# Patient Record
Sex: FEMALE | Race: ASIAN | Employment: UNEMPLOYED | URBAN - METROPOLITAN AREA
[De-identification: names, ages, dates, MRNs, and addresses within clinical notes are randomized per-mention and may not be internally consistent; named-entity substitution may affect disease eponyms.]

---

## 2017-04-24 ENCOUNTER — OFFICE VISIT (OUTPATIENT)
Dept: FAMILY MEDICINE CLINIC | Age: 27
End: 2017-04-24

## 2017-04-24 VITALS
RESPIRATION RATE: 16 BRPM | BODY MASS INDEX: 33.96 KG/M2 | WEIGHT: 173 LBS | HEART RATE: 73 BPM | DIASTOLIC BLOOD PRESSURE: 70 MMHG | TEMPERATURE: 98.2 F | SYSTOLIC BLOOD PRESSURE: 114 MMHG | HEIGHT: 60 IN | OXYGEN SATURATION: 98 %

## 2017-04-24 DIAGNOSIS — J32.1 CHRONIC FRONTAL SINUSITIS: ICD-10-CM

## 2017-04-24 DIAGNOSIS — Z01.419 WELL WOMAN EXAM WITH ROUTINE GYNECOLOGICAL EXAM: Primary | ICD-10-CM

## 2017-04-24 DIAGNOSIS — F32.A MILD DEPRESSION: ICD-10-CM

## 2017-04-24 DIAGNOSIS — G44.229 CHRONIC TENSION-TYPE HEADACHE, NOT INTRACTABLE: ICD-10-CM

## 2017-04-24 RX ORDER — MELOXICAM 15 MG/1
15 TABLET ORAL
Qty: 30 TAB | Refills: 2 | Status: SHIPPED | OUTPATIENT
Start: 2017-04-24 | End: 2017-06-15 | Stop reason: ALTCHOICE

## 2017-04-24 RX ORDER — CYCLOBENZAPRINE HCL 5 MG
5 TABLET ORAL
Qty: 30 TAB | Refills: 2 | Status: SHIPPED | OUTPATIENT
Start: 2017-04-24 | End: 2017-06-15 | Stop reason: ALTCHOICE

## 2017-04-24 NOTE — PATIENT INSTRUCTIONS

## 2017-04-24 NOTE — PROGRESS NOTES
HISTORY OF PRESENT ILLNESS  Viki Stallings is a 32 y.o. female. she was seen for complete Gyn exam. Other concerns were, feeling very angry and tiered and headaches. HPI  Routine Gyn Exam  Patient here for routine exam.  Current Complaints: headache, irritability. Personal Health Questionnaire Reviewed: yes     Gynecologic History  Patient's last menstrual period was 2017. Contraception: condoms  Last Pap: 10/2015  Results: normal    Obstetric History  : 2  Para: 1  AB: 1  Had miscarriage 10/2016    Depression  She also seen for new evaluation and treatment of of depression. She complains of psychomotor agitation, fatigue and difficulty concentrating. Onset was approximately several months ago, unchanged since that time. She denies current suicidal and homicidal plan or intent. Family history significant for nothing. Possible organic causes contributing are: nutritional, she had miscarriage and heavy blood loss. [place]. Staying home and taking care of daughter when  is at work. . Previous drug treatment includes none; other therapy: no other therapies. She complains of the following side effects from the treatment: n/a      Review of Systems   Constitutional: Positive for malaise/fatigue. Negative for chills and fever. HENT: Negative for congestion, ear pain, sore throat and tinnitus. Eyes: Negative for blurred vision, double vision, pain and discharge. Respiratory: Negative for cough, shortness of breath and wheezing. Cardiovascular: Negative for chest pain, palpitations and leg swelling. Gastrointestinal: Positive for abdominal pain. Negative for blood in stool, constipation, diarrhea, nausea and vomiting. Genitourinary: Negative for dysuria, frequency, hematuria and urgency. Musculoskeletal: Negative for back pain, joint pain and myalgias. Skin: Negative for rash. Neurological: Positive for headaches.  Negative for dizziness, tremors and seizures. Endo/Heme/Allergies: Negative for polydipsia. Does not bruise/bleed easily. Psychiatric/Behavioral: Positive for depression. Negative for substance abuse. The patient is nervous/anxious. Physical Exam   Constitutional: She is oriented to person, place, and time. She appears well-developed and well-nourished. No distress. HENT:   Head: Normocephalic and atraumatic. Right Ear: Tympanic membrane and external ear normal. No drainage. Tympanic membrane is not injected. No middle ear effusion. No decreased hearing is noted. Left Ear: Tympanic membrane and external ear normal. No drainage. Tympanic membrane is not injected. No middle ear effusion. No decreased hearing is noted. Nose: Nose normal. No rhinorrhea. Mouth/Throat: Uvula is midline and oropharynx is clear and moist. No oropharyngeal exudate. Nasal mucosa congested, edematous   Eyes: Conjunctivae and EOM are normal. Pupils are equal, round, and reactive to light. No scleral icterus. Neck: Normal range of motion. Neck supple. No JVD present. No thyromegaly present. Cardiovascular: Normal rate, regular rhythm, normal heart sounds and intact distal pulses. Exam reveals no gallop and no friction rub. No murmur heard. Pulmonary/Chest: Effort normal and breath sounds normal. She has no wheezes. She has no rales. She exhibits no tenderness. Right breast exhibits no inverted nipple, no mass, no nipple discharge, no skin change and no tenderness. Left breast exhibits no inverted nipple, no mass, no nipple discharge, no skin change and no tenderness. Breasts are symmetrical.   Abdominal: Soft. Bowel sounds are normal. She exhibits no distension and no mass. There is no tenderness. Genitourinary: Uterus normal. No breast swelling, tenderness, discharge or bleeding. Cervix exhibits no motion tenderness, no discharge and no friability. Right adnexum displays no mass and no tenderness.  Left adnexum displays no mass and no tenderness. Vaginal discharge found. Musculoskeletal: Normal range of motion. She exhibits no edema or tenderness. Lymphadenopathy:        Head (right side): No tonsillar adenopathy present. Head (left side): No tonsillar adenopathy present. She has no cervical adenopathy. She has no axillary adenopathy. Right: No inguinal and no supraclavicular adenopathy present. Left: No inguinal and no supraclavicular adenopathy present. Neurological: She is alert and oriented to person, place, and time. She has normal reflexes. No cranial nerve deficit. Sensations normal   Skin: Skin is warm and dry. No rash noted. Psychiatric: She has a normal mood and affect. Her behavior is normal. Judgment and thought content normal.   Nursing note and vitals reviewed. ASSESSMENT and PLAN  Humza Session was seen today for complete physical, headache, leg pain and other. Diagnoses and all orders for this visit:    Well woman exam with routine gynecological exam  -     CBC WITH AUTOMATED DIFF  -     METABOLIC PANEL, COMPREHENSIVE  -     TSH REFLEX TO T4  -     URINALYSIS W/ RFLX MICROSCOPIC  -     PAP IG, RFX HPV ASCU, 16&18,45([id number])    Chronic frontal sinusitis    Chronic tension-type headache, not intractable  -     meloxicam (MOBIC) 15 mg tablet; Take 1 Tab by mouth nightly. -     cyclobenzaprine (FLEXERIL) 5 mg tablet; Take 1 Tab by mouth nightly. Mild depression ([address]    had counseling. Pt wants to hold on medicine for now  Discussed lifestyle issues and health guidance given  Patient was given an after visit summary which includes diagnoses, vital signs, current medications, instructions and references & authorized prescriptions . Results of labs will be conveyed to patient, once available. Pt verbalized instructions I provided and expressed understanding of discussion that was held today. Follow-up Disposition:  Return if symptoms worsen or fail to improve.

## 2017-04-24 NOTE — MR AVS SNAPSHOT
Visit Information Date & Time Provider Department Dept. Phone Encounter #  
 4/24/2017  2:00 PM Noelle Moran MD [address] [place] [address] [phone] [id number] Follow-up Instructions Return if symptoms worsen or fail to improve. Upcoming Health Maintenance Date Due DTaP/Tdap/Td series (1 - Tdap) 2/5/2011 PAP AKA CERVICAL CYTOLOGY 10/8/2018 Allergies as of 4/24/2017  Review Complete On: 4/24/2017 By: Noelle Moran MD  
 No Known Allergies Current Immunizations  Reviewed on 10/24/2016 Name Date Influenza Vaccine (Quad) PF 10/24/2016 Not reviewed this visit You Were Diagnosed With   
  
 Codes Comments Well woman exam with routine gynecological exam    -  Primary ICD-10-CM: B10.979 ICD-9-CM: V72.31 Chronic frontal sinusitis     ICD-10-CM: J32.1 ICD-9-CM: 609.3 Chronic tension-type headache, not intractable     ICD-10-CM: C09.380 ICD-9-CM: 339.12 Mild depression (HCC)     ICD-10-CM: F32.0 ICD-9-CM: 701 Vitals BP Pulse Temp Resp Height(growth percentile) Weight(growth percentile) 114/70 (BP 1 Location: Left arm, BP Patient Position: Sitting) 73 98.2 °F (36.8 °C) (Oral) 16 5' (1.524 m) 173 lb (78.5 kg) LMP SpO2 BMI OB Status Smoking Status 04/04/2017 98% 33.79 kg/m2 Having regular periods Never Smoker Vitals History BMI and BSA Data Body Mass Index Body Surface Area 33.79 kg/m 2 1.82 m 2 Preferred Pharmacy Pharmacy Name Phone [id number] [address] [phone] Your Updated Medication List  
  
   
This list is accurate as of: 4/24/17  2:43 PM.  Always use your most recent med list.  
  
  
  
  
 cyclobenzaprine 5 mg tablet Commonly known as:  FLEXERIL Take 1 Tab by mouth nightly. meloxicam 15 mg tablet Commonly known as:  MOBIC Take 1 Tab by mouth nightly. Prescriptions Sent to Pharmacy Refills  
 meloxicam (MOBIC) 15 mg tablet 2 Sig: Take 1 Tab by mouth nightly. Class: Normal  
 Pharmacy: [id number] Ph #: [phone] Route: Oral  
 cyclobenzaprine (FLEXERIL) 5 mg tablet 2 Sig: Take 1 Tab by mouth nightly. Class: Normal  
 Pharmacy: [facility]/pharmacy #[id number] Ph #: [phone] Route: Oral  
  
We Performed the Following CBC WITH AUTOMATED DIFF [03808 CPT(R)] METABOLIC PANEL, COMPREHENSIVE [87003 CPT(R)] PAP IG, [address] HPV ASCU, [id number]) [[id number] Custom] TSH REFLEX TO T4 [[id number] Custom] URINALYSIS W/ RFLX MICROSCOPIC [46579 CPT(R)] Follow-up Instructions Return if symptoms worsen or fail to improve. Patient Instructions Well Visit, Ages 25 to 48: Care Instructions Your Care Instructions Physical exams can help you stay healthy. Your doctor has checked your overall health and may have suggested ways to take good care of yourself. He or she also may have recommended tests. At home, you can help prevent illness with healthy eating, regular exercise, and other steps. Follow-up care is a key part of your treatment and safety. Be sure to make and go to all appointments, and call your doctor if you are having problems. It's also a good idea to know your test results and keep a list of the medicines you take. How can you care for yourself at home? · Reach and stay at a healthy weight. This will lower your risk for many problems, such as obesity, diabetes, heart disease, and high blood pressure. · Get at least 30 minutes of physical activity on most days of the week. Walking is a good choice. You also may want to do other activities, such as running, swimming, cycling, or playing tennis or team sports. Discuss any changes in your exercise program with your doctor. · Do not smoke or allow others to smoke around you. If you need help quitting, talk to your doctor about stop-smoking programs and medicines. These can increase your chances of quitting for good. · Talk to your doctor about whether you have any risk factors for sexually transmitted infections (STIs). Having one sex partner (who does not have STIs and does not have sex with anyone else) is a good way to avoid these infections. · Use birth control if you do not want to have children at this time. Talk with your doctor about the choices available and what might be best for you. · Protect your skin from too much sun. When you're outdoors from 10 a.m. to 4 p.m., stay in the shade or cover up with clothing and a hat with a wide brim. Wear sunglasses that block UV rays. Even when it's cloudy, put broad-spectrum sunscreen (SPF 30 or higher) on any exposed skin. · See a dentist one or two times a year for checkups and to have your teeth cleaned. · Wear a seat belt in the car. · Drink alcohol in moderation, if at all. That means no more than 2 drinks a day for men and 1 drink a day for women. Follow your doctor's advice about when to have certain tests. These tests can spot problems early. For everyone · Cholesterol. Have the fat (cholesterol) in your blood tested after age 21. Your doctor will tell you how often to have this done based on your age, family history, or other things that can increase your risk for heart disease. · Blood pressure. Have your blood pressure checked during a routine doctor visit. Your doctor will tell you how often to check your blood pressure based on your age, your blood pressure results, and other factors. · Vision. Talk with your doctor about how often to have a glaucoma test. 
· Diabetes. Ask your doctor whether you should have tests for diabetes. · Colon cancer. Have a test for colon cancer at age 48.  You may have one of several tests. If you are younger than 48, you may need a test earlier if you have any risk factors. Risk factors include whether you already had a precancerous polyp removed from your colon or whether your parent, brother, sister, or child has had colon cancer. For women · Breast exam and mammogram. Talk to your doctor about when you should have a clinical breast exam and a mammogram. Medical experts differ on whether and how often women under 50 should have these tests. Your doctor can help you decide what is right for you. · Pap test and pelvic exam. Begin Pap tests at age 24. A Pap test is the best way to find cervical cancer. The test often is part of a pelvic exam. Ask how often to have this test. 
· Tests for sexually transmitted infections (STIs). Ask whether you should have tests for STIs. You may be at risk if you have sex with more than one person, especially if your partners do not wear condoms. For men · Tests for sexually transmitted infections (STIs). Ask whether you should have tests for STIs. You may be at risk if you have sex with more than one person, especially if you do not wear a condom. · Testicular cancer exam. Ask your doctor whether you should check your testicles regularly. · Prostate exam. Talk to your doctor about whether you should have a blood test (called a PSA test) for prostate cancer. Experts differ on whether and when men should have this test. Some experts suggest it if you are older than 39 and are -American or have a father or brother who got prostate cancer when he was younger than 72. When should you call for help? Watch closely for changes in your health, and be sure to contact your doctor if you have any problems or symptoms that concern you. Where can you learn more? Go to http://kaveh-lauren.info/. Enter P072 in the search box to learn more about \"Well Visit, Ages 25 to 48: Care Instructions. \" Current as of: July 19, 2016 Content Version: 11.2 © 1150-2298 [employer], [employer]. Care instructions adapted under license by [employer] (which disclaims liability or warranty for this information). If you have questions about a medical condition or this instruction, always ask your healthcare professional. [address] any warranty or liability for your use of this information. Introducing [facility]! Belinda López introduces TrialBee patient portal. Now you can access parts of your medical record, email your doctor's office, and request medication refills online. 1. In your internet browser, go to [email or web address] 2. Click on the First Time User? Click Here link in the Sign In box. You will see the New Member Sign Up page. 3. Enter your TrialBee Access Code exactly as it appears below. You will not need to use this code after youve completed the sign-up process. If you do not sign up before the expiration date, you must request a new code. · TrialBee Access Code: [email or web address] Expires: 7/23/2017  2:42 PM 
 
4. Enter the last four digits of your Social Security Number (xxxx) and Date of Birth (mm/dd/yyyy) as indicated and click Submit. You will be taken to the next sign-up page. 5. Create a TrialBee ID. This will be your TrialBee login ID and cannot be changed, so think of one that is secure and easy to remember. 6. Create a TrialBee password. You can change your password at any time. 7. Enter your Password Reset Question and Answer. This can be used at a later time if you forget your password. 8. Enter your e-mail address. You will receive e-mail notification when new information is available in [address] 9. Click Sign Up. You can now view and download portions of your medical record. 10. Click the Download Summary menu link to download a portable copy of your medical information. If you have questions, please visit the Frequently Asked Questions section of the [employer] website. Remember, [employer] is NOT to be used for urgent needs. For medical emergencies, dial 911. Now available from your iPhone and Android! Please provide this summary of care documentation to your next provider. Your primary care clinician is listed as Chinita Kussmaul. If you have any questions after today's visit, please call [phone].

## 2017-04-25 LAB
ALBUMIN SERPL-MCNC: 4.5 G/DL (ref 3.5–5.5)
ALBUMIN/GLOB SERPL: 1.4 {RATIO} (ref 1.2–2.2)
ALP SERPL-CCNC: 117 IU/L (ref 39–117)
ALT SERPL-CCNC: 23 IU/L (ref 0–32)
APPEARANCE UR: ABNORMAL
AST SERPL-CCNC: 19 IU/L (ref 0–40)
BACTERIA #/AREA URNS HPF: ABNORMAL /[HPF]
BASOPHILS # BLD AUTO: 0 X10E3/UL (ref 0–0.2)
BASOPHILS NFR BLD AUTO: 0 %
BILIRUB SERPL-MCNC: 0.5 MG/DL (ref 0–1.2)
BILIRUB UR QL STRIP: NEGATIVE
BUN SERPL-MCNC: 8 MG/DL (ref 6–20)
BUN/CREAT SERPL: 22 (ref 9–23)
CALCIUM SERPL-MCNC: 9.5 MG/DL (ref 8.7–10.2)
CASTS URNS QL MICRO: ABNORMAL /LPF
CHLORIDE SERPL-SCNC: 102 MMOL/L (ref 96–106)
CO2 SERPL-SCNC: 28 MMOL/L (ref 18–29)
COLOR UR: YELLOW
CREAT SERPL-MCNC: 0.37 MG/DL (ref 0.57–1)
EOSINOPHIL # BLD AUTO: 0.1 X10E3/UL (ref 0–0.4)
EOSINOPHIL NFR BLD AUTO: 1 %
EPI CELLS #/AREA URNS HPF: ABNORMAL /HPF
ERYTHROCYTE [DISTWIDTH] IN BLOOD BY AUTOMATED COUNT: 15.6 % (ref 12.3–15.4)
GLOBULIN SER CALC-MCNC: 3.2 G/DL (ref 1.5–4.5)
GLUCOSE SERPL-MCNC: 112 MG/DL (ref 65–99)
GLUCOSE UR QL: NEGATIVE
HCT VFR BLD AUTO: 37.2 % (ref 34–46.6)
HGB BLD-MCNC: 11.9 G/DL (ref 11.1–15.9)
HGB UR QL STRIP: NEGATIVE
IMM GRANULOCYTES # BLD: 0 X10E3/UL (ref 0–0.1)
IMM GRANULOCYTES NFR BLD: 0 %
KETONES UR QL STRIP: NEGATIVE
LEUKOCYTE ESTERASE UR QL STRIP: ABNORMAL
LYMPHOCYTES # BLD AUTO: 2.3 X10E3/UL (ref 0.7–3.1)
LYMPHOCYTES NFR BLD AUTO: 37 %
MCH RBC QN AUTO: 26.9 PG (ref 26.6–33)
MCHC RBC AUTO-ENTMCNC: 32 G/DL (ref 31.5–35.7)
MCV RBC AUTO: 84 FL (ref 79–97)
MICRO URNS: ABNORMAL
MONOCYTES # BLD AUTO: 0.6 X10E3/UL (ref 0.1–0.9)
MONOCYTES NFR BLD AUTO: 10 %
MUCOUS THREADS URNS QL MICRO: PRESENT
NEUTROPHILS # BLD AUTO: 3.2 X10E3/UL (ref 1.4–7)
NEUTROPHILS NFR BLD AUTO: 52 %
NITRITE UR QL STRIP: NEGATIVE
PH UR STRIP: 7.5 [PH] (ref 5–7.5)
PLATELET # BLD AUTO: 282 X10E3/UL (ref 150–379)
POTASSIUM SERPL-SCNC: 4.4 MMOL/L (ref 3.5–5.2)
PROT SERPL-MCNC: 7.7 G/DL (ref 6–8.5)
PROT UR QL STRIP: NEGATIVE
RBC # BLD AUTO: 4.43 X10E6/UL (ref 3.77–5.28)
RBC #/AREA URNS HPF: ABNORMAL /HPF
SODIUM SERPL-SCNC: 139 MMOL/L (ref 134–144)
SP GR UR: 1.01 (ref 1–1.03)
TSH SERPL DL<=0.005 MIU/L-ACNC: 0.59 UIU/ML (ref 0.45–4.5)
UROBILINOGEN UR STRIP-MCNC: 1 MG/DL (ref 0.2–1)
WBC # BLD AUTO: 6.1 X10E3/UL (ref 3.4–10.8)
WBC #/AREA URNS HPF: ABNORMAL /HPF

## 2017-04-25 NOTE — PROGRESS NOTES
Please inform pt  Blood count, kidney, liver, thyroid, urine results are normal.once we receive pap smear result, will let her know.

## 2017-06-07 ENCOUNTER — HOSPITAL ENCOUNTER (EMERGENCY)
Age: 27
Discharge: HOME | End: 2017-06-07
Attending: EMERGENCY MEDICINE | Admitting: EMERGENCY MEDICINE
Payer: COMMERCIAL

## 2017-06-07 ENCOUNTER — APPOINTMENT (OUTPATIENT)
Dept: GENERAL RADIOLOGY | Age: 27
End: 2017-06-07
Attending: EMERGENCY MEDICINE
Payer: COMMERCIAL

## 2017-06-07 VITALS
RESPIRATION RATE: 16 BRPM | HEART RATE: 67 BPM | DIASTOLIC BLOOD PRESSURE: 77 MMHG | SYSTOLIC BLOOD PRESSURE: 129 MMHG | BODY MASS INDEX: 33.94 KG/M2 | TEMPERATURE: 97.7 F | WEIGHT: 173.8 LBS | OXYGEN SATURATION: 99 %

## 2017-06-07 DIAGNOSIS — R06.02 SOB (SHORTNESS OF BREATH): Primary | ICD-10-CM

## 2017-06-07 LAB
HCG UR QL: NEGATIVE
S PYO AG THROAT QL: NEGATIVE

## 2017-06-07 PROCEDURE — 70360 X-RAY EXAM OF NECK: CPT

## 2017-06-07 PROCEDURE — 87880 STREP A ASSAY W/OPTIC: CPT

## 2017-06-07 PROCEDURE — 71020 XR CHEST PA LAT: CPT

## 2017-06-07 PROCEDURE — 99283 EMERGENCY DEPT VISIT LOW MDM: CPT

## 2017-06-07 PROCEDURE — 74011000250 HC RX REV CODE- 250: Performed by: EMERGENCY MEDICINE

## 2017-06-07 PROCEDURE — 81025 URINE PREGNANCY TEST: CPT

## 2017-06-07 PROCEDURE — 94640 AIRWAY INHALATION TREATMENT: CPT

## 2017-06-07 PROCEDURE — 87070 CULTURE OTHR SPECIMN AEROBIC: CPT | Performed by: EMERGENCY MEDICINE

## 2017-06-07 PROCEDURE — 77030029684 HC NEB SM VOL KT MONA -A

## 2017-06-07 RX ORDER — ALBUTEROL SULFATE 90 UG/1
2 AEROSOL, METERED RESPIRATORY (INHALATION)
Qty: 1 INHALER | Refills: 0 | Status: SHIPPED | OUTPATIENT
Start: 2017-06-07 | End: 2017-09-26 | Stop reason: ALTCHOICE

## 2017-06-07 RX ORDER — ALBUTEROL SULFATE 0.83 MG/ML
2.5 SOLUTION RESPIRATORY (INHALATION)
Status: COMPLETED | OUTPATIENT
Start: 2017-06-07 | End: 2017-06-07

## 2017-06-07 RX ADMIN — ALBUTEROL SULFATE 2.5 MG: 2.5 SOLUTION RESPIRATORY (INHALATION) at 04:56

## 2017-06-07 NOTE — ED NOTES
Water provided as the patient has been unable to provide urine specimen thus far in the Emergency Department after approval from Dr. Singh Wilkinson. Respiratory at the bedside currently. POC Strep running at the bedside currently.

## 2017-06-07 NOTE — ED NOTES
Patient discharged by Dr. Luisa Foreman. I reviewed discharge instructions with the patient and her . No acute distress noted at time of discharge home. Per her , patient agrees to feeling improved at time of discharge home. Patient and her family decline refreshments at time of discharge home.

## 2017-06-07 NOTE — DISCHARGE INSTRUCTIONS
Shortness of Breath: Care Instructions  Your Care Instructions  Shortness of breath has many causes. Sometimes conditions such as anxiety can lead to shortness of breath. Some people get mild shortness of breath when they exercise. Trouble breathing also can be a symptom of a serious problem, such as asthma, lung disease, emphysema, heart problems, and pneumonia. If your shortness of breath continues, you may need tests and treatment. Watch for any changes in your breathing and other symptoms. Follow-up care is a key part of your treatment and safety. Be sure to make and go to all appointments, and call your doctor if you are having problems. Its also a good idea to know your test results and keep a list of the medicines you take. How can you care for yourself at home? · Do not smoke or allow others to smoke around you. If you need help quitting, talk to your doctor about stop-smoking programs and medicines. These can increase your chances of quitting for good. · Get plenty of rest and sleep. · Take your medicines exactly as prescribed. Call your doctor if you think you are having a problem with your medicine. · Find healthy ways to deal with stress. ¨ Exercise daily. ¨ Get plenty of sleep. ¨ Eat regularly and well. When should you call for help? Call 911 anytime you think you may need emergency care. For example, call if:  · You have severe shortness of breath. · You have symptoms of a heart attack. These may include:  ¨ Chest pain or pressure, or a strange feeling in the chest.  ¨ Sweating. ¨ Shortness of breath. ¨ Nausea or vomiting. ¨ Pain, pressure, or a strange feeling in the back, neck, jaw, or upper belly or in one or both shoulders or arms. ¨ Lightheadedness or sudden weakness. ¨ A fast or irregular heartbeat. After you call 911, the  may tell you to chew 1 adult-strength or 2 to 4 low-dose aspirin. Wait for an ambulance. Do not try to drive yourself.   Call your doctor now or seek immediate medical care if:  · Your shortness of breath gets worse or you start to wheeze. Wheezing is a high-pitched sound when you breathe. · You wake up at night out of breath or have to prop your head up on several pillows to breathe. · You are short of breath after only light activity or while at rest.  Watch closely for changes in your health, and be sure to contact your doctor if:  · You do not get better over the next 1 to 2 days. Where can you learn more? Go to http://kaveh-lauren.info/. Enter S780 in the search box to learn more about \"Shortness of Breath: Care Instructions. \"  Current as of: May 23, 2016  Content Version: 11.2  © 0996-8809 [employer]. Care instructions adapted under license by [employer] (which disclaims liability or warranty for this information). If you have questions about a medical condition or this instruction, always ask your healthcare professional. [place] [address] any warranty or liability for your use of this information. We hope that we have addressed all of your medical concerns. The examination and treatment you received in the Emergency Department were for an emergent problem and were not intended as complete care. It is important that you follow up with your healthcare provider(s) for ongoing care. If your symptoms worsen or do not improve as expected, and you are unable to reach your usual health care provider(s), you should return to the Emergency Department. Today's healthcare is undergoing tremendous change, and patient satisfaction surveys are one of the many tools to assess the quality of medical care. You may receive a survey from the [employer] organization regarding your experience in the Emergency Department. I hope that your experience has been completely positive, particularly the medical care that I provided.   As such, please participate in the survey; anything less than excellent does not meet my expectations or intentions. [facility] and [employer] participate in nationally recognized quality of care measures. If your blood pressure is greater than 120/80, as reported below, we urge that you seek medical care to address the potential of high blood pressure, commonly known as hypertension. Hypertension can be hereditary or can be caused by certain medical conditions, pain, stress, or \"white coat syndrome. \"       Please make an appointment with your health care provider(s) for follow up of your Emergency Department visit. VITALS:   Patient Vitals for the past 8 hrs:   Temp Pulse Resp BP SpO2   06/07/17 0543 97.7 °F (36.5 °C) 67 16 129/77 99 %   06/07/17 0139 98.2 °F (36.8 °C) 82 26 113/66 100 %          Thank you for allowing us to provide you with medical care today. We realize that you have many choices for your emergency care needs. Please choose us in the future for any continued health care needs. Suzie Weber Memos, [address] [facility].   Office: [phone]            Recent Results (from the past 24 hour(s))   POC GROUP A STREP    Collection Time: 06/07/17  5:03 AM   Result Value Ref Range    Group A strep (POC) NEGATIVE  NEG     HCG URINE, QL. - POC    Collection Time: 06/07/17  5:46 AM   Result Value Ref Range    Pregnancy test,urine (POC) NEGATIVE  NEG         Xr Neck Soft Tissue    Result Date: 6/7/2017  Clinical Data: Neck pain, dysphagia AP and lateral soft tissue evaluation of the neck dated 6/7/2017. Findings: Soft tissues of the neck are unremarkable. Upper airway is without evidence of narrowing. Epiglottis is normal. No prevertebral soft tissue swelling. Visualized cervical spine is unremarkable. No radiopaque foreign bodies are visualized. Impression: 1. No acute abnormality appreciated.      Xr Chest Pa Lat    Result Date: 6/7/2017  INDICATION:   SHORTNESS OF BREATH EXAM:  PA and Lateral Chest Radiographs COMPARISON: None FINDINGS: PA and lateral views of the chest demonstrate a normal cardiomediastinal silhouette. The lungs are adequately expanded. There is no edema, effusion, consolidation, or pneumothorax. The osseous structures are unremarkable. Multiple small calcifications are seen in the lower left neck, for which clinical correlation is recommended. IMPRESSION: No acute process.

## 2017-06-07 NOTE — ED PROVIDER NOTES
HPI Comments: 44-year-old female with no significant past medical history presents with complaints of dry throat and shortness of breath x3 days. Patient denies cough, sore throat, nasal congestion, fever. No history of similar complaints. Denies chest pain, nausea, vomiting, abdominal pain, back pain. No history of DVT/PE. Denies leg swelling. denies hemoptysis. Denies hormone use. No other complaints. Denies tobacco use, drug use and alcohol use    The history is provided by the patient and the spouse. Past Medical History:   Diagnosis Date    Anemia        Past Surgical History:   Procedure Laterality Date    HX APPENDECTOMY           Family History:   Problem Relation Age of Onset    No Known Problems Mother     No Known Problems Father        Social History     Social History    Marital status:      Spouse name: N/A    Number of children: N/A    Years of education: N/A     Occupational History    Not on file. Social History Main Topics    Smoking status: Never Smoker    Smokeless tobacco: Never Used    Alcohol use No    Drug use: No    Sexual activity: Not on file     Other Topics Concern    Not on file     Social History Narrative         ALLERGIES: Review of patient's allergies indicates no known allergies. Review of Systems   Constitutional: Negative for chills and fever. HENT: Negative for congestion, nosebleeds, rhinorrhea and sore throat. Eyes: Negative for pain and redness. Respiratory: Positive for shortness of breath. Negative for cough. Cardiovascular: Negative for chest pain and palpitations. Gastrointestinal: Negative for abdominal pain, nausea and vomiting. Genitourinary: Negative for dysuria, frequency, vaginal bleeding and vaginal pain. Musculoskeletal: Negative for myalgias. Skin: Negative for rash and wound. Neurological: Negative for seizures, syncope and weakness. Hematological: Does not bruise/bleed easily.    Psychiatric/Behavioral: Negative for agitation, confusion, dysphoric mood and suicidal ideas. The patient is not nervous/anxious. Vitals:    06/07/17 0139   BP: 113/66   Pulse: 82   Resp: 26   Temp: 98.2 °F (36.8 °C)   SpO2: 100%   Weight: 78.8 kg (173 lb 12.8 oz)            Physical Exam   Constitutional: She is oriented to person, place, and time. She appears well-developed and well-nourished. HENT:   Head: Normocephalic and atraumatic. Eyes: EOM are normal. Pupils are equal, round, and reactive to light. Neck: Normal range of motion. Neck supple. No tracheal deviation present. Cardiovascular: Normal rate, regular rhythm, normal heart sounds and intact distal pulses. Pulmonary/Chest: Effort normal and breath sounds normal. No stridor. No respiratory distress. She has no wheezes. She has no rales. She exhibits no tenderness. Abdominal: Soft. Bowel sounds are normal. She exhibits no distension. There is no tenderness. There is no rebound. Musculoskeletal: Normal range of motion. She exhibits no edema or tenderness. Neurological: She is alert and oriented to person, place, and time. No cranial nerve deficit. Skin: Skin is warm and dry. No rash noted. No pallor. Psychiatric: She has a normal mood and affect. Nursing note and vitals reviewed. MDM  Number of Diagnoses or Management Options  SOB (shortness of breath):   Diagnosis management comments:   54-year-old female presents with dry throat and shortness of breath x3 days. Patient is well-appearing, in no acute distress, no respiratory distress, clear to auscultation bilaterally, speaking in complete sentences, normal room air oxygen saturation, normal vital signs.   Chest x-ray unremarkable  Neck x-ray unremarkable       Amount and/or Complexity of Data Reviewed  Tests in the radiology section of CPT®: ordered and reviewed  Independent visualization of images, tracings, or specimens: yes    Risk of Complications, Morbidity, and/or Mortality  Presenting problems: low  Diagnostic procedures: low  Management options: low    Patient Progress  Patient progress: improved    ED Course       Procedures      5:41 AM  Pt reports breathing improved after nebulizer. Well appearing, nad, hd stable, no resp distress, CTAB.

## 2017-06-07 NOTE — ED TRIAGE NOTES
TRIAGE: Patient arrives from home, escorted by male  and child, for shortness of breath that has been ongoing for 3 days and worsened today. She denies any cough or chest pain. She reports that she feels like her throat is very dry. Patient speaks Sony [facility] Triage completed via [employer]  #[id number].

## 2017-06-08 ENCOUNTER — PATIENT OUTREACH (OUTPATIENT)
Dept: FAMILY MEDICINE CLINIC | Age: 27
End: 2017-06-08

## 2017-06-08 NOTE — PROGRESS NOTES
NNTOC-ED    Patient listed on discharge [facility]) report dated 17. Patient discharged from [facility] for SOB. Contacted patient to perform post ED/UC discharge assessment. Verified  and address with patient' , Karlos Braga on [employer]IP[employer], as identifiers. Provided introduction to self, and explanation of the NN role. Mr. Lynne Lucas reports that the patient is doing good. Performed medication reconciliation with Mr. Lynne Lucas, and he verbalizes understanding of administration of home medications. Reviewed discharge instructions with patient. Patient verbalizes understand of discharge instructions and follow-up care. This writer offered to send a message to the provider's nurse to schedule an appointment, but Mr. Lynne Lucas wishes to contact the office to schedule an appointment when it is needed. Reviewed red flags with patient, and patient verbalizes understanding. No other clinical/social/functional needs noted. Patient given an opportunity to ask questions. Contact information provided to the patient for future reference or further questions. Red Flags:  Call 911 anytime you think you may need emergency care. For example, call if:  · You have severe shortness of breath. · You have symptoms of a heart attack. These may include:  ¨ Chest pain or pressure, or a strange feeling in the chest.  ¨ Sweating. ¨ Shortness of breath. ¨ Nausea or vomiting. ¨ Pain, pressure, or a strange feeling in the back, neck, jaw, or upper belly or in one or both shoulders or arms. ¨ Lightheadedness or sudden weakness. ¨ A fast or irregular heartbeat. After you call 911, the  may tell you to chew 1 adult-strength or 2 to 4 low-dose aspirin. Wait for an ambulance. Do not try to drive yourself. Call your doctor now or seek immediate medical care if:  · Your shortness of breath gets worse or you start to wheeze. Wheezing is a high-pitched sound when you breathe.   · You wake up at night out of breath or have to prop your head up on several pillows to breathe. · You are short of breath after only light activity or while at rest.  Watch closely for changes in your health, and be sure to contact your doctor if:  · You do not get better over the next 1 to 2 days.

## 2017-06-09 LAB
BACTERIA SPEC CULT: NORMAL
SERVICE CMNT-IMP: NORMAL

## 2017-06-15 ENCOUNTER — OFFICE VISIT (OUTPATIENT)
Dept: FAMILY MEDICINE CLINIC | Age: 27
End: 2017-06-15

## 2017-06-15 VITALS
TEMPERATURE: 98 F | BODY MASS INDEX: 33.18 KG/M2 | OXYGEN SATURATION: 97 % | DIASTOLIC BLOOD PRESSURE: 79 MMHG | WEIGHT: 169 LBS | RESPIRATION RATE: 16 BRPM | SYSTOLIC BLOOD PRESSURE: 121 MMHG | HEART RATE: 70 BPM | HEIGHT: 60 IN

## 2017-06-15 DIAGNOSIS — J30.9 ALLERGIC SINUSITIS: Primary | ICD-10-CM

## 2017-06-15 RX ORDER — FAMOTIDINE 40 MG/1
40 TABLET, FILM COATED ORAL
Qty: 30 TAB | Refills: 0 | Status: SHIPPED | OUTPATIENT
Start: 2017-06-15 | End: 2017-08-02

## 2017-06-15 RX ORDER — MONTELUKAST SODIUM 10 MG/1
10 TABLET ORAL DAILY
Qty: 30 TAB | Refills: 0 | Status: SHIPPED | OUTPATIENT
Start: 2017-06-15 | End: 2017-08-02 | Stop reason: ALTCHOICE

## 2017-06-15 RX ORDER — FLUTICASONE PROPIONATE 50 MCG
SPRAY, SUSPENSION (ML) NASAL
Qty: 1 BOTTLE | Refills: 0 | Status: SHIPPED | OUTPATIENT
Start: 2017-06-15 | End: 2017-09-26 | Stop reason: ALTCHOICE

## 2017-06-15 NOTE — PROGRESS NOTES
Chief Complaint   Patient presents with    Shortness of Breath     patient states \" when breathing feels very tight in throat. \"  dryness in nose .  Headache    Other     missed period, no cycle since April 4, 2017      1. Have you been to the ER, urgent care clinic since your last visit? ER- [facility] :  Shortness of Breathe. Hospitalized since your last visit? No    2. Have you seen or consulted any other health care providers outside of the [address] [place] [address] since your last visit? Include any pap smears or colon screening.  No

## 2017-06-15 NOTE — PROGRESS NOTES
HISTORY OF PRESENT ILLNESS  Rob Camilo is a 32 y.o. female. She was seen for follow up her recent ER visit for dry throat and SOB. [facility]  Hospital Follow Up  Rob Camilo is seen for follow up from recent ED visit to [facility] on 6/7/17. We reviewed the active problem list, medication list, allergies, notes from last encounter, imaging, ER course. She presented with SOB and throat tightness. Had Xray chest and neck, both normal.she was given nebulized Rx, which helped and she was discharged with albuterol inhaler She is taking her albuterol as directed & without any side effects. She reports symptoms are not changed. She still has dryness of nose and throat and breathing difficulty at night. She has missed period x last 2 months, UPT in ER negative. Review of Systems   Constitutional: Negative for chills, fever and malaise/fatigue. HENT: Positive for congestion. Negative for ear pain, sore throat and tinnitus. Throat dryness and tightness   Eyes: Negative for blurred vision, double vision, pain and discharge. Respiratory: Positive for shortness of breath. Negative for cough and wheezing. Cardiovascular: Negative for chest pain, palpitations and leg swelling. Gastrointestinal: Negative for abdominal pain, blood in stool, constipation, diarrhea, nausea and vomiting. Genitourinary: Negative for dysuria, frequency, hematuria and urgency. Missed period   Musculoskeletal: Negative for back pain, joint pain and myalgias. Skin: Negative for rash. Neurological: Negative for dizziness, tremors, seizures and headaches. Endo/Heme/Allergies: Negative for polydipsia. Does not bruise/bleed easily. Psychiatric/Behavioral: Negative for depression and substance abuse. The patient is not nervous/anxious. Physical Exam   Constitutional: No distress. HENT:   Right Ear: Tympanic membrane normal. No drainage. Tympanic membrane is not injected. No middle ear effusion.  No decreased hearing is noted. Left Ear: Tympanic membrane normal. No drainage. Tympanic membrane is not injected. No middle ear effusion. No decreased hearing is noted. Nose: Mucosal edema present. No rhinorrhea. Right sinus exhibits maxillary sinus tenderness and frontal sinus tenderness. Left sinus exhibits maxillary sinus tenderness and frontal sinus tenderness. Mouth/Throat: Uvula is midline. Posterior oropharyngeal erythema present. No oropharyngeal exudate. Nasal mucosa congested, edematous   Cardiovascular: Regular rhythm and normal heart sounds. No murmur heard. Pulmonary/Chest: Effort normal and breath sounds normal. She has no wheezes. She has no rales. Lymphadenopathy:        Head (right side): No tonsillar adenopathy present. Head (left side): No tonsillar adenopathy present. She has no cervical adenopathy. Nursing note and vitals reviewed. ASSESSMENT and PLAN  Mp Dugan was seen today for shortness of breath, headache and other. Diagnoses and all orders for this visit:    Allergic sinusitis  -     montelukast (SINGULAIR) 10 mg tablet; Take 1 Tab by mouth daily. -     famotidine (PEPCID) 40 mg tablet; Take 1 Tab by mouth nightly. -     fluticasone (FLONASE) 50 mcg/actuation nasal spray; Use two spray each nostrils at night    advised to stay well hydrated and limit albuterol use. Discussed lifestyle issues and health guidance given  Patient was given an after visit summary which includes diagnoses, vital signs, current medications, instructions and references & authorized prescriptions . Pt verbalized instructions I provided and expressed understanding of discussion that was held today. Follow-up Disposition:  Return if symptoms worsen or fail to improve.

## 2017-06-15 NOTE — PATIENT INSTRUCTIONS

## 2017-06-15 NOTE — MR AVS SNAPSHOT
Visit Information Date & Time Provider Department Dept. Phone Encounter #  
 6/15/2017  1:00 PM Yuko Farfan, [address] [facility] [phone] [id number] Follow-up Instructions Return if symptoms worsen or fail to improve. Upcoming Health Maintenance Date Due DTaP/Tdap/Td series (1 - Tdap) 2/5/2011 INFLUENZA AGE 9 TO ADULT 8/1/2017 PAP AKA CERVICAL CYTOLOGY 5/2/2020 Allergies as of 6/15/2017  Review Complete On: 6/15/2017 By: Yuko Farfan MD  
 No Known Allergies Current Immunizations  Reviewed on 10/24/2016 Name Date Influenza Vaccine (Quad) PF 10/24/2016 Not reviewed this visit You Were Diagnosed With   
  
 Codes Comments Allergic sinusitis    -  Primary ICD-10-CM: J30.9 ICD-9-CM: 477.9 Vitals BP Pulse Temp Resp Height(growth percentile) Weight(growth percentile) 121/79 (BP 1 Location: Right arm, BP Patient Position: Sitting) 70 98 °F (36.7 °C) (Oral) 16 5' (1.524 m) 169 lb (76.7 kg) LMP SpO2 BMI OB Status Smoking Status 04/04/2017 97% 33.01 kg/m2 Having regular periods Never Smoker Vitals History BMI and BSA Data Body Mass Index Body Surface Area 33.01 kg/m 2 1.8 m 2 Preferred Pharmacy Pharmacy Name Phone [id number] [address] [phone] Your Updated Medication List  
  
   
This list is accurate as of: 6/15/17  1:38 PM.  Always use your most recent med list.  
  
  
  
  
 albuterol 90 mcg/actuation inhaler Commonly known as:  PROVENTIL HFA, VENTOLIN HFA, PROAIR HFA Take 2 Puffs by inhalation every six (6) hours as needed for Wheezing. famotidine 40 mg tablet Commonly known as:  PEPCID Take 1 Tab by mouth nightly. fluticasone 50 mcg/actuation nasal spray Commonly known as:  Sharon Alvarez Use two spray each nostrils at night  
  
 montelukast 10 mg tablet Commonly known as:  SINGULAIR Take 1 Tab by mouth daily. Prescriptions Sent to Pharmacy Refills  
 montelukast (SINGULAIR) 10 mg tablet 0 Sig: Take 1 Tab by mouth daily. Class: Normal  
 [id number] [address] Ph #: [phone] Route: Oral  
 famotidine (PEPCID) 40 mg tablet 0 Sig: Take 1 Tab by mouth nightly. Class: Normal  
 [id number] [address] Ph #: [phone] Route: Oral  
 fluticasone (FLONASE) 50 mcg/actuation nasal spray 0 Sig: Use two spray each nostrils at night Class: Normal  
 [id number] [address] Ph #: [phone] Follow-up Instructions Return if symptoms worsen or fail to improve. Patient Instructions Allergies: Care Instructions Your Care Instructions Allergies occur when your body's defense system (immune system) overreacts to certain substances. The immune system treats a harmless substance as if it were a harmful germ or virus. Many things can cause this overreaction, including pollens, medicine, food, dust, animal dander, and mold. Allergies can be mild or severe. Mild allergies can be managed with home treatment. But medicine may be needed to prevent problems. Managing your allergies is an important part of staying healthy. Your doctor may suggest that you have allergy testing to help find out what is causing your allergies. When you know what things trigger your symptoms, you can avoid them. This can prevent allergy symptoms and other health problems. For severe allergies that cause reactions that affect your whole body (anaphylactic reactions), your doctor may prescribe a shot of epinephrine to carry with you in case you have a severe reaction.  Learn how to give yourself the shot and keep it with you at all times. Make sure it is not . Follow-up care is a key part of your treatment and safety. Be sure to make and go to all appointments, and call your doctor if you are having problems. It's also a good idea to know your test results and keep a list of the medicines you take. How can you care for yourself at home? · If you have been told by your doctor that dust or dust mites are causing your allergy, decrease the dust around your bed: 
[facility] [place] [facility] sheets, pillowcases, and other bedding in hot water every week. ¨ Use dust-proof covers for pillows, duvets, and mattresses. Avoid plastic covers because they tear easily and do not \"breathe. \" Wash as instructed on the label. ¨ Do not use any blankets and pillows that you do not need. ¨ Use blankets that you can wash in your washing machine. ¨ Consider removing drapes and carpets, which attract and hold dust, from your bedroom. · If you are allergic to house dust and mites, do not use home humidifiers. Your doctor can suggest ways you can control dust and mites. · Look for signs of cockroaches. Cockroaches cause allergic reactions. Use cockroach baits to get rid of them. Then, clean your home well. Cockroaches like areas where grocery bags, newspapers, empty bottles, or cardboard boxes are stored. Do not keep these inside your home, and keep trash and food containers sealed. Seal off any spots where cockroaches might enter your home. · If you are allergic to mold, get rid of furniture, rugs, and drapes that smell musty. Check for mold in the bathroom. · If you are allergic to outdoor pollen or mold spores, use air-conditioning. Change or clean all filters every month. Keep windows closed. · If you are allergic to pollen, stay inside when pollen counts are high. Use a vacuum  with a HEPA filter or a double-thickness filter at least two times each week. · Stay inside when air pollution is bad. Avoid paint fumes, perfumes, and other strong odors. · Avoid conditions that make your allergies worse. Stay away from smoke. Do not smoke or let anyone else smoke in your house. Do not use fireplaces or wood-burning stoves. · If you are allergic to your pets, change the air filter in your furnace every month. Use high-efficiency filters. · If you are allergic to pet dander, keep pets outside or out of your bedroom. Old carpet and cloth furniture can hold a lot of animal dander. You may need to replace them. When should you call for help? Give an epinephrine shot if: 
· You think you are having a severe allergic reaction. · You have symptoms in more than one body area, such as mild nausea and an itchy mouth. After giving an epinephrine shot call 911, even if you feel better. Call 911 if: 
· You have symptoms of a severe allergic reaction. These may include: 
¨ Sudden raised, red areas (hives) all over your body. ¨ Swelling of the throat, mouth, lips, or tongue. ¨ Trouble breathing. ¨ Passing out (losing consciousness). Or you may feel very lightheaded or suddenly feel weak, confused, or restless. · You have been given an epinephrine shot, even if you feel better. Call your doctor now or seek immediate medical care if: 
· You have symptoms of an allergic reaction, such as: ¨ A rash or hives (raised, red areas on the skin). ¨ Itching. ¨ Swelling. ¨ Belly pain, nausea, or vomiting. Watch closely for changes in your health, and be sure to contact your doctor if: 
· You do not get better as expected. Where can you learn more? Go to http://kaveh-lauren.info/. Enter Y798 in the search box to learn more about \"Allergies: Care Instructions. \" Current as of: February 12, 2016 Content Version: 11.2 © 3661-9052 [employer].  Care instructions adapted under license by [employer] (which disclaims liability or warranty for this information). If you have questions about a medical condition or this instruction, always ask your healthcare professional. [address] any warranty or liability for your use of this information. Introducing [facility] [place] Silvia Lua introduces [employer] patient portal. Now you can access parts of your medical record, email your doctor's office, and request medication refills online. 1. In your internet browser, go to https://[employer]. [employer] 2. Click on the First Time User? Click Here link in the Sign In box. You will see the New Member Sign Up page. 3. Enter your [employer] Access Code exactly as it appears below. You will not need to use this code after youve completed the sign-up process. If you do not sign up before the expiration date, you must request a new code. · [employer] Access Code: [email or web address] Expires: 7/23/2017  2:42 PM 
 
4. Enter the last four digits of your Social Security Number (xxxx) and Date of Birth (mm/dd/yyyy) as indicated and click Submit. You will be taken to the next sign-up page. 5. Create a [employer] ID. This will be your [employer] login ID and cannot be changed, so think of one that is secure and easy to remember. 6. Create a [employer] password. You can change your password at any time. 7. Enter your Password Reset Question and Answer. This can be used at a later time if you forget your password. 8. Enter your e-mail address. You will receive e-mail notification when new information is available in [address] 9. Click Sign Up. You can now view and download portions of your medical record. 10. Click the Download Summary menu link to download a portable copy of your medical information. If you have questions, please visit the Frequently Asked Questions section of the [employer] website. Remember, [employer] is NOT to be used for urgent needs. For medical emergencies, dial 911. Now available from your iPhone and Android! Please provide this summary of care documentation to your next provider. Your primary care clinician is listed as Erika Abraham. If you have any questions after today's visit, please call [phone].

## 2017-08-02 ENCOUNTER — OFFICE VISIT (OUTPATIENT)
Dept: INTERNAL MEDICINE CLINIC | Age: 27
End: 2017-08-02

## 2017-08-02 VITALS
DIASTOLIC BLOOD PRESSURE: 62 MMHG | BODY MASS INDEX: 31.28 KG/M2 | WEIGHT: 170 LBS | HEART RATE: 74 BPM | OXYGEN SATURATION: 98 % | TEMPERATURE: 96.8 F | RESPIRATION RATE: 17 BRPM | HEIGHT: 62 IN | SYSTOLIC BLOOD PRESSURE: 111 MMHG

## 2017-08-02 DIAGNOSIS — N92.6 MISSED PERIOD: ICD-10-CM

## 2017-08-02 DIAGNOSIS — R07.0 THROAT DISCOMFORT: Primary | ICD-10-CM

## 2017-08-02 DIAGNOSIS — R10.13 EPIGASTRIC PAIN: ICD-10-CM

## 2017-08-02 DIAGNOSIS — K21.9 GASTROESOPHAGEAL REFLUX DISEASE WITHOUT ESOPHAGITIS: ICD-10-CM

## 2017-08-02 LAB
HCG URINE, QL. (POC): NEGATIVE
VALID INTERNAL CONTROL?: YES

## 2017-08-02 RX ORDER — PANTOPRAZOLE SODIUM 40 MG/1
40 TABLET, DELAYED RELEASE ORAL DAILY
Qty: 30 TAB | Refills: 2 | Status: SHIPPED | OUTPATIENT
Start: 2017-08-02 | End: 2020-08-11 | Stop reason: ALTCHOICE

## 2017-08-02 NOTE — MR AVS SNAPSHOT
Visit Information Date & Time Provider Department Dept. Phone Encounter #  
 8/2/2017 10:30 AM Js Ascencio MD [facility] Internal Medicine [phone] [id number] Follow-up Instructions Return in about 1 month (around 9/2/2017). Upcoming Health Maintenance Date Due DTaP/Tdap/Td series (1 - Tdap) 2/5/2011 INFLUENZA AGE 9 TO ADULT 8/1/2017 PAP AKA CERVICAL CYTOLOGY 5/2/2020 Allergies as of 8/2/2017  Review Complete On: 8/2/2017 By: Shantel Jimenez MD  
 No Known Allergies Current Immunizations  Reviewed on 10/24/2016 Name Date Influenza Vaccine (Quad) PF 10/24/2016 Not reviewed this visit You Were Diagnosed With   
  
 Codes Comments Throat discomfort    -  Primary ICD-10-CM: R07.0 ICD-9-CM: 784.1 Gastroesophageal reflux disease without esophagitis     ICD-10-CM: K21.9 ICD-9-CM: 530.81 Epigastric pain     ICD-10-CM: R10.13 ICD-9-CM: 789.06 Missed period     ICD-10-CM: N92.6 ICD-9-CM: 626.4 Vitals BP Pulse Temp Resp Height(growth percentile) Weight(growth percentile) 111/62 (BP 1 Location: Left arm, BP Patient Position: Sitting) 74 96.8 °F (36 °C) (Oral) 17 5' 1.5\" (1.562 m) 170 lb (77.1 kg) LMP SpO2 BMI OB Status Smoking Status 04/04/2017 98% 31.6 kg/m2 Unknown Never Smoker Vitals History BMI and BSA Data Body Mass Index Body Surface Area  
 31.6 kg/m 2 1.83 m 2 Preferred Pharmacy Pharmacy Name Phone [id number], [address] [place] [place] [address] [phone] Your Updated Medication List  
  
   
This list is accurate as of: 8/2/17 11:16 AM.  Always use your most recent med list.  
  
  
  
  
 albuterol 90 mcg/actuation inhaler Commonly known as:  PROVENTIL HFA, VENTOLIN HFA, PROAIR HFA Take 2 Puffs by inhalation every six (6) hours as needed for Wheezing. fluticasone 50 mcg/actuation nasal spray Commonly known as:  Genie Maximo Use two spray each nostrils at night  
  
 pantoprazole 40 mg tablet Commonly known as:  PROTONIX Take 1 Tab by mouth daily. Prescriptions Sent to Pharmacy Refills  
 pantoprazole (PROTONIX) 40 mg tablet 2 Sig: Take 1 Tab by mouth daily. Class: Normal  
 Pharmacy: [facility] #[id number] #: [phone] Route: Oral  
  
We Performed the Following AMB POC URINE PREGNANCY TEST, VISUAL COLOR COMPARISON [18417 CPT(R)] Follow-up Instructions Return in about 1 month (around 9/2/2017). Introducing [facility] & HEALTH SERVICES! [employer] introduces Rewarding Return patient portal. Now you can access parts of your medical record, email your doctor's office, and request medication refills online. 1. In your internet browser, go to [email or web address] 2. Click on the First Time User? Click Here link in the Sign In box. You will see the New Member Sign Up page. 3. Enter your Rewarding Return Access Code exactly as it appears below. You will not need to use this code after youve completed the sign-up process. If you do not sign up before the expiration date, you must request a new code. · Rewarding Return Access Code: [email or web address] Expires: 10/31/2017 11:14 AM 
 
4. Enter the last four digits of your Social Security Number (xxxx) and Date of Birth (mm/dd/yyyy) as indicated and click Submit. You will be taken to the next sign-up page. 5. Create a Floqt ID. This will be your Rewarding Return login ID and cannot be changed, so think of one that is secure and easy to remember. 6. Create a Rewarding Return password. You can change your password at any time. 7. Enter your Password Reset Question and Answer. This can be used at a later time if you forget your password. 8. Enter your e-mail address. You will receive e-mail notification when new information is available in [address] 9. Click Sign Up. You can now view and download portions of your medical record. 10. Click the Download Summary menu link to download a portable copy of your medical information. If you have questions, please visit the Frequently Asked Questions section of the [email or web address] website. Remember, [email or web address] is NOT to be used for urgent needs. For medical emergencies, dial 911. Now available from your iPhone and Android! Please provide this summary of care documentation to your next provider. Your primary care clinician is listed as Meg Kenney. If you have any questions after today's visit, please call [phone].

## 2017-08-03 NOTE — PROGRESS NOTES
Subjective:     Chief Complaint   Patient presents with    Other     stes that when she breathes, she feels like something is stuck in her throat. Worse at night x 1 month        She  is a 32y.o. year old female from [place] who presents as a new patient to establish as well as with some medical concern. Her past medical history is significant for chronic sinusitis, GERD. Reports that she has h/o irregular period. She sometimes miss 2-3 months in a row. Last period was in 4/2017. Not using any birth control at this point. She states that when she breaths, she feels like something is stuck in her chest and throat area. Sometimes she wakes up at night with feeling of cannot breath. Denies any cough, soa. Reports that when she eat something spicy or heavy food she feels epigastric pain and burning sensation in her throat. She had taken Prevacid for three weeks with no improvement. Xray soft tissue and chest was normal on 6/2017. Recent thyroid panel is normal     H/o miscarriage more than year ago. Pertinent items are noted in HPI.   Objective:     Vitals:    08/02/17 1035   BP: 111/62   Pulse: 74   Resp: 17   Temp: 96.8 °F (36 °C)   TempSrc: Oral   SpO2: 98%   Weight: 170 lb (77.1 kg)   Height: 5' 1.5\" (1.562 m)       Physical Examination: General appearance - alert, well appearing, and in no distress, oriented to person, place, and time and overweight  Mental status - alert, oriented to person, place, and time  Ears - bilateral TM's and external ear canals normal  Nose - normal and patent, no erythema, discharge or polyps  Mouth - mucous membranes moist, pharynx normal without lesions  Neck - supple, no significant adenopathy  Chest - clear to auscultation, no wheezes, rales or rhonchi, symmetric air entry  Heart - normal rate, regular rhythm, normal S1, S2, no murmurs, rubs, clicks or gallops    No Known Allergies   Social History     Social History    Marital status:      Spouse name: N/A    Number of children: N/A    Years of education: N/A     Social History Main Topics    Smoking status: Never Smoker    Smokeless tobacco: Never Used    Alcohol use No    Drug use: No    Sexual activity: Not Asked     Other Topics Concern    None     Social History Narrative      Family History   Problem Relation Age of Onset    No Known Problems Mother     No Known Problems Father       Past Surgical History:   Procedure Laterality Date    HX APPENDECTOMY        Past Medical History:   Diagnosis Date    Anemia       Current Outpatient Prescriptions   Medication Sig Dispense Refill    pantoprazole (PROTONIX) 40 mg tablet Take 1 Tab by mouth daily. 30 Tab 2    fluticasone (FLONASE) 50 mcg/actuation nasal spray Use two spray each nostrils at night 1 Bottle 0    albuterol (PROVENTIL HFA, VENTOLIN HFA, PROAIR HFA) 90 mcg/actuation inhaler Take 2 Puffs by inhalation every six (6) hours as needed for Wheezing. 1 Inhaler 0        Assessment/ Plan:   Diagnoses and all orders for this visit:    1. Throat discomfort  -   D/D GERD, somatization, Post nasal drip,  Start   pantoprazole (PROTONIX) 40 mg tablet; Take 1 Tab by mouth daily. 2. Gastroesophageal reflux disease without esophagitis  -     pantoprazole (PROTONIX) 40 mg tablet; Take 1 Tab by mouth daily. 3. Epigastric pain  -     pantoprazole (PROTONIX) 40 mg tablet; Take 1 Tab by mouth daily. 4. Missed period  -    H/O irregular period. No period for 3 months. Pregnancy test is negative. Will consider OCP if pt willing to start. -    AMB POC URINE PREGNANCY TEST, VISUAL COLOR COMPARISON is negative. Medication risks/benefits/costs/interactions/alternatives discussed with patient. Advised patient to call back or return to office if symptoms worsen/change/persist. If patient cannot reach us or should anything more severe/urgent arise he/she should proceed directly to the nearest emergency department.   Discussed expected course/resolution/complications of diagnosis in detail with patient. Patient given a written after visit summary which includes her diagnoses, current medications and vitals. Patient expressed understanding with the diagnosis and plan. Follow-up Disposition:  Return in about 1 month (around 9/2/2017).

## 2017-09-07 ENCOUNTER — OFFICE VISIT (OUTPATIENT)
Dept: INTERNAL MEDICINE CLINIC | Age: 27
End: 2017-09-07

## 2017-09-07 VITALS
HEART RATE: 95 BPM | WEIGHT: 170 LBS | TEMPERATURE: 97.5 F | RESPIRATION RATE: 18 BRPM | DIASTOLIC BLOOD PRESSURE: 62 MMHG | OXYGEN SATURATION: 99 % | BODY MASS INDEX: 31.28 KG/M2 | SYSTOLIC BLOOD PRESSURE: 112 MMHG | HEIGHT: 62 IN

## 2017-09-07 DIAGNOSIS — E66.9 OBESITY (BMI 30-39.9): ICD-10-CM

## 2017-09-07 DIAGNOSIS — N92.6 MISSED PERIOD: ICD-10-CM

## 2017-09-07 DIAGNOSIS — K21.9 GASTROESOPHAGEAL REFLUX DISEASE WITHOUT ESOPHAGITIS: Primary | ICD-10-CM

## 2017-09-07 DIAGNOSIS — R73.9 ELEVATED BLOOD SUGAR: ICD-10-CM

## 2017-09-07 LAB
HBA1C MFR BLD HPLC: 5.4 %
HCG URINE, QL. (POC): NEGATIVE
VALID INTERNAL CONTROL?: YES

## 2017-09-26 ENCOUNTER — OFFICE VISIT (OUTPATIENT)
Dept: FAMILY MEDICINE CLINIC | Age: 27
End: 2017-09-26

## 2017-09-26 VITALS
HEART RATE: 76 BPM | WEIGHT: 171 LBS | TEMPERATURE: 98.1 F | OXYGEN SATURATION: 98 % | RESPIRATION RATE: 16 BRPM | BODY MASS INDEX: 31.47 KG/M2 | SYSTOLIC BLOOD PRESSURE: 112 MMHG | HEIGHT: 62 IN | DIASTOLIC BLOOD PRESSURE: 70 MMHG

## 2017-09-26 DIAGNOSIS — N91.1 SECONDARY AMENORRHEA: Primary | ICD-10-CM

## 2017-09-26 DIAGNOSIS — E55.9 VITAMIN D DEFICIENCY: ICD-10-CM

## 2017-09-26 DIAGNOSIS — R10.13 EPIGASTRIC PAIN: ICD-10-CM

## 2017-09-26 DIAGNOSIS — J02.9 ALLERGIC PHARYNGITIS: ICD-10-CM

## 2017-09-26 LAB
BILIRUB UR QL STRIP: NEGATIVE
GLUCOSE UR-MCNC: NEGATIVE MG/DL
HCG URINE, QL. (POC): NEGATIVE
KETONES P FAST UR STRIP-MCNC: NEGATIVE MG/DL
PH UR STRIP: 5 [PH] (ref 4.6–8)
PROT UR QL STRIP: NEGATIVE MG/DL
SP GR UR STRIP: 1.02 (ref 1–1.03)
UA UROBILINOGEN AMB POC: NORMAL (ref 0.2–1)
URINALYSIS CLARITY POC: NORMAL
URINALYSIS COLOR POC: YELLOW
URINE BLOOD POC: NORMAL
URINE LEUKOCYTES POC: NORMAL
URINE NITRITES POC: NEGATIVE
VALID INTERNAL CONTROL?: YES

## 2017-09-26 RX ORDER — MEDROXYPROGESTERONE ACETATE 5 MG/1
5 TABLET ORAL 2 TIMES DAILY
Qty: 10 TAB | Refills: 0 | Status: SHIPPED | OUTPATIENT
Start: 2017-09-26 | End: 2020-08-11 | Stop reason: ALTCHOICE

## 2017-09-26 RX ORDER — MONTELUKAST SODIUM 10 MG/1
10 TABLET ORAL DAILY
Qty: 30 TAB | Refills: 0 | Status: SHIPPED | OUTPATIENT
Start: 2017-09-26 | End: 2017-11-18 | Stop reason: SDUPTHER

## 2017-09-26 RX ORDER — ERGOCALCIFEROL 1.25 MG/1
50000 CAPSULE ORAL
Qty: 4 CAP | Refills: 5 | Status: SHIPPED | OUTPATIENT
Start: 2017-09-26 | End: 2020-08-11 | Stop reason: ALTCHOICE

## 2017-09-26 NOTE — MR AVS SNAPSHOT
Visit Information Date & Time Provider Department Dept. Phone Encounter #  
 9/26/2017 10:20 AM Gurinder Horta MD [address] [place] [address] [phone] [id number] Follow-up Instructions Return if symptoms worsen or fail to improve. Upcoming Health Maintenance Date Due DTaP/Tdap/Td series (1 - Tdap) 2/5/2011 PAP AKA CERVICAL CYTOLOGY 5/2/2020 Allergies as of 9/26/2017  Review Complete On: 9/26/2017 By: Gurinder Horta MD  
 No Known Allergies Current Immunizations  Reviewed on 10/24/2016 Name Date Influenza Vaccine 8/23/2017 Influenza Vaccine (Quad) PF 10/24/2016 Not reviewed this visit You Were Diagnosed With   
  
 Codes Comments Secondary amenorrhea    -  Primary ICD-10-CM: N91.1 ICD-9-CM: 626.0 Epigastric pain     ICD-10-CM: R10.13 ICD-9-CM: 789.06 Allergic pharyngitis     ICD-10-CM: J02.9 ICD-9-CM: 251 Vitamin D deficiency     ICD-10-CM: E55.9 ICD-9-CM: 268.9 Vitals BP Pulse Temp Resp Height(growth percentile) Weight(growth percentile) 112/70 (BP 1 Location: Right arm, BP Patient Position: Sitting) 76 98.1 °F (36.7 °C) (Oral) 16 5' 1.5\" (1.562 m) 171 lb (77.6 kg) LMP SpO2 BMI OB Status Smoking Status 04/04/2017 98% 31.79 kg/m2 Unknown Never Smoker Vitals History BMI and BSA Data Body Mass Index Body Surface Area 31.79 kg/m 2 1.83 m 2 Preferred Pharmacy Pharmacy Name Phone [id number] [address] [phone] Your Updated Medication List  
  
   
This list is accurate as of: 9/26/17 11:06 AM.  Always use your most recent med list.  
  
  
  
  
 ergocalciferol 50,000 unit capsule Commonly known as:  ERGOCALCIFEROL Take 1 Cap by mouth every seven (7) days. medroxyPROGESTERone 5 mg tablet Commonly known as:  PROVERA Take 1 Tab by mouth two (2) times a day.  Indications: SECONDARY AMENORRHEA  
 montelukast 10 mg tablet Commonly known as:  SINGULAIR Take 1 Tab by mouth daily. pantoprazole 40 mg tablet Commonly known as:  PROTONIX Take 1 Tab by mouth daily. Prescriptions Sent to Pharmacy Refills  
 medroxyPROGESTERone (PROVERA) 5 mg tablet 0 Sig: Take 1 Tab by mouth two (2) times a day. Indications: SECONDARY AMENORRHEA Class: Normal  
 [id number] [address] Ph #: [phone] Route: Oral  
 montelukast (SINGULAIR) 10 mg tablet 0 Sig: Take 1 Tab by mouth daily. Class: Normal  
 [id number] [address] Ph #: [phone] Route: Oral  
 ergocalciferol (ERGOCALCIFEROL) 50,000 unit capsule 5 Sig: Take 1 Cap by mouth every seven (7) days. Class: Normal  
 [id number] [address] Ph #: [phone] Route: Oral  
  
We Performed the Following AMB POC URINALYSIS DIP STICK AUTO W/O MICRO [68764 CPT(R)] AMB POC URINE PREGNANCY TEST, VISUAL COLOR COMPARISON [92062 CPT(R)] Follow-up Instructions Return if symptoms worsen or fail to improve. Patient Instructions Learning About Vitamin D Why is it important to get enough vitamin D? Your body needs vitamin D to absorb calcium. Calcium keeps your bones and muscles, including your heart, healthy and strong. If your muscles don't get enough calcium, they can cramp, hurt, or feel weak. You may have long-term (chronic) muscle aches and pains. If you don't get enough vitamin D throughout life, you have an increased chance of having thin and brittle bones (osteoporosis) in your later years. Children who don't get enough vitamin D may not grow as much as others their age. They also have a chance of getting a rare disease called rickets. It causes weak bones. Vitamin D and calcium are added to many foods. And your body uses sunshine to make its own vitamin D. How much vitamin D do you need? The [place] of Medicine recommends that people ages 3 through 79 get 600 IU (international units) every day. Adults 71 and older need 800 IU every day. Blood tests for vitamin D can check your vitamin D level. But there is no standard normal range used by all laboratories. The [place] of Medicine recommends a blood level of 20 ng/mL of vitamin D for healthy bones. And most people in the [place] and [facility]) meet this goal. 
How can you get more vitamin D? Foods that contain vitamin D include: 
· [place], tuna, and mackerel. These are some of the best foods to eat when you need to get more vitamin D. 
· Cheese, egg yolks, and beef liver. These foods have vitamin D in small amounts. · Milk, soy drinks, orange juice, yogurt, margarine, and some kinds of cereal have vitamin D added to them. Some people don't make vitamin D as well as others. They may have to take extra care in getting enough vitamin D. Things that reduce how much vitamin D your body makes include: · Dark skin, such as many  Americans have. · Age, especially if you are older than 72. · Digestive problems, such as Crohn's or celiac disease. · Liver and kidney disease. Some people who do not get enough vitamin D may need supplements. Are there any risks from taking vitamin D? 
· Too much vitamin D: 
¨ Can damage your kidneys. ¨ Can cause nausea and vomiting, constipation, and weakness. ¨ Raises the amount of calcium in your blood. If this happens, you can get confused or have an irregular heart rhythm. · Vitamin D may interact with other medicines. Tell your doctor about all of the medicines you take, including over-the-counter drugs, herbs, and pills. Tell your doctor about all of your current medical problems. Where can you learn more? Go to http://kaveh-lauren.info/. Enter [id number] in the search box to learn more about \"Learning About Vitamin D.\" 
Current as of: July 26, 2016 Content Version: 11.3 © 4037-1700 [employer]. Care instructions adapted under license by [employer] (which disclaims liability or warranty for this information). If you have questions about a medical condition or this instruction, always ask your healthcare professional. [place] [address] any warranty or liability for your use of this information. Introducing \[facility]\"" & HEALTH SERVICES! [facility] introduces Carlotz patient portal. Now you can access parts of your medical record, email your doctor's office, and request medication refills online. 1. In your internet browser, go to [email or web address] 2. Click on the First Time User? Click Here link in the Sign In box. You will see the New Member Sign Up page. 3. Enter your Carlotz Access Code exactly as it appears below. You will not need to use this code after youve completed the sign-up process. If you do not sign up before the expiration date, you must request a new code. · Carlotz Access Code: [email or web address] Expires: 10/31/2017 11:14 AM 
 
4. Enter the last four digits of your Social Security Number (xxxx) and Date of Birth (mm/dd/yyyy) as indicated and click Submit. You will be taken to the next sign-up page. 5. Create a Carlotz ID. This will be your Carlotz login ID and cannot be changed, so think of one that is secure and easy to remember. 6. Create a Carlotz password. You can change your password at any time. 7. Enter your Password Reset Question and Answer. This can be used at a later time if you forget your password. 8. Enter your e-mail address. You will receive e-mail notification when new information is available in [address] 9. Click Sign Up. You can now view and download portions of your medical record. 10. Click the Download Summary menu link to download a portable copy of your medical information. If you have questions, please visit the Frequently Asked Questions section of the Sportomato website. Remember, Sportomato is NOT to be used for urgent needs. For medical emergencies, dial 911. Now available from your iPhone and Android! Please provide this summary of care documentation to your next provider. Your primary care clinician is listed as Js Garcia. If you have any questions after today's visit, please call [phone].

## 2017-09-26 NOTE — PROGRESS NOTES
Chief Complaint   Patient presents with    Abdominal Pain    Menstrual Problem    Other     throat being dry     1. Have you been to the ER, urgent care clinic since your last visit? Hospitalized since your last visit? No    2. Have you seen or consulted any other health care providers outside of the [address] [place] [address] since your last visit? Include any pap smears or colon screening.  No

## 2017-09-26 NOTE — PATIENT INSTRUCTIONS
Learning About Vitamin D  Why is it important to get enough vitamin D? Your body needs vitamin D to absorb calcium. Calcium keeps your bones and muscles, including your heart, healthy and strong. If your muscles don't get enough calcium, they can cramp, hurt, or feel weak. You may have long-term (chronic) muscle aches and pains. If you don't get enough vitamin D throughout life, you have an increased chance of having thin and brittle bones (osteoporosis) in your later years. Children who don't get enough vitamin D may not grow as much as others their age. They also have a chance of getting a rare disease called rickets. It causes weak bones. Vitamin D and calcium are added to many foods. And your body uses sunshine to make its own vitamin D. How much vitamin D do you need? The [place] of Medicine recommends that people ages 3 through 79 get 600 IU (international units) every day. Adults 71 and older need 800 IU every day. Blood tests for vitamin D can check your vitamin D level. But there is no standard normal range used by all laboratories. The [place] of Medicine recommends a blood level of 20 ng/mL of vitamin D for healthy bones. And most people in the [place] and [facility]) meet this goal.  How can you get more vitamin D? Foods that contain vitamin D include:  · [place], tuna, and mackerel. These are some of the best foods to eat when you need to get more vitamin D.  · Cheese, egg yolks, and beef liver. These foods have vitamin D in small amounts. · Milk, soy drinks, orange juice, yogurt, margarine, and some kinds of cereal have vitamin D added to them. Some people don't make vitamin D as well as others. They may have to take extra care in getting enough vitamin D. Things that reduce how much vitamin D your body makes include:  · Dark skin, such as many  Americans have. · Age, especially if you are older than 72. · Digestive problems, such as Crohn's or celiac disease.   · Liver and kidney disease. Some people who do not get enough vitamin D may need supplements. Are there any risks from taking vitamin D?  · Too much vitamin D:  ¨ Can damage your kidneys. ¨ Can cause nausea and vomiting, constipation, and weakness. ¨ Raises the amount of calcium in your blood. If this happens, you can get confused or have an irregular heart rhythm. · Vitamin D may interact with other medicines. Tell your doctor about all of the medicines you take, including over-the-counter drugs, herbs, and pills. Tell your doctor about all of your current medical problems. Where can you learn more? Go to http://kavehMirolauren.info/. Enter [id number] in the search box to learn more about \"Learning About Vitamin D.\"  Current as of: July 26, 2016  Content Version: 11.3  © 5779-5155 [employer]wise, Incorporated. Care instructions adapted under license by [employer] (which disclaims liability or warranty for this information). If you have questions about a medical condition or this instruction, always ask your healthcare professional. [address] any warranty or liability for your use of this information.

## 2017-09-26 NOTE — PROGRESS NOTES
HISTORY OF PRESENT ILLNESS  Mela Berry is a 32 y.o. female. She was seen for abdominal pain, amenorrhea and dryness on throat  HPI  Abdominal Pain  Patient complains of abdominal pain. The pain is described as shooting and cramping, and is 6/10 in intensity. Pain is located in the LLQ, RLQ, suprapubic with radiation to perineum. Onset was a few weeks ago. Symptoms have been unchanged since. Aggravating factors: none. Alleviating factors: none. Associated symptoms: amenorrhea x 5 months. The patient denies chills, constipation, diarrhea, dysuria, fever, hematochezia, hematuria, melena, nausea and vomiting. URI Review  Ruthie Palacios returns to clinic today to talk about: congestion, nasal blockage, dry throat and URI symptoms for gradual and several months ago, which are unchanged since that time. She also reports headache, bilateral sinus pain and sinus congestion. She denies a history of: coryza, fever, chills, rhinorrhea, chest congestion, wheezing and SOB/JANG. Treatments have included: decongestants, antihistamines, PPI, which have been not very effective. Relevant PMH: Sinusitis and allergic rhinitis. Patient reports sick contacts: no.          Review of Systems   Constitutional: Negative for chills, fever and malaise/fatigue. HENT: Positive for congestion. Negative for ear pain, sore throat and tinnitus. Throat dryness and tightness   Eyes: Negative for blurred vision, double vision, pain and discharge. Respiratory: Positive for shortness of breath. Negative for cough and wheezing. Cardiovascular: Negative for chest pain, palpitations and leg swelling. Gastrointestinal: Negative for abdominal pain, blood in stool, constipation, diarrhea, nausea and vomiting. Genitourinary: Negative for dysuria, frequency, hematuria and urgency. Missed period   Musculoskeletal: Negative for back pain, joint pain and myalgias. Skin: Negative for rash.    Neurological: Negative for dizziness, tremors, seizures and headaches. Endo/Heme/Allergies: Negative for polydipsia. Does not bruise/bleed easily. Psychiatric/Behavioral: Negative for depression and substance abuse. The patient is not nervous/anxious. Physical Exam   Constitutional: No distress. HENT:   Right Ear: Tympanic membrane normal. No drainage. Tympanic membrane is not injected. No middle ear effusion. No decreased hearing is noted. Left Ear: Tympanic membrane normal. No drainage. Tympanic membrane is not injected. No middle ear effusion. No decreased hearing is noted. Nose: Mucosal edema present. No rhinorrhea. Right sinus exhibits maxillary sinus tenderness and frontal sinus tenderness. Left sinus exhibits maxillary sinus tenderness and frontal sinus tenderness. Mouth/Throat: Uvula is midline. Posterior oropharyngeal erythema present. No oropharyngeal exudate. Nasal mucosa congested, edematous   Cardiovascular: Regular rhythm and normal heart sounds. No murmur heard. Pulmonary/Chest: Effort normal and breath sounds normal. She has no wheezes. She has no rales. Lymphadenopathy:        Head (right side): No tonsillar adenopathy present. Head (left side): No tonsillar adenopathy present. She has no cervical adenopathy. Nursing note and vitals reviewed. ASSESSMENT and PLAN  Diagnoses and all orders for this visit:    1. Secondary amenorrhea  -     AMB POC URINE PREGNANCY TEST, VISUAL COLOR COMPARISON  -     AMB POC URINALYSIS DIP STICK AUTO W/O MICRO  -     medroxyPROGESTERone (PROVERA) 5 mg tablet; Take 1 Tab by mouth two (2) times a day. Indications: SECONDARY AMENORRHEA    2. Epigastric pain    3. Allergic pharyngitis  -     montelukast (SINGULAIR) 10 mg tablet; Take 1 Tab by mouth daily. 4. Vitamin D deficiency  -     ergocalciferol (ERGOCALCIFEROL) 50,000 unit capsule; Take 1 Cap by mouth every seven (7) days. UPT and UA negative. Will challenge with progesterone for amenorrhea.   Discussed lifestyle issues and health guidance given  Patient was given an after visit summary which includes diagnoses, vital signs, current medications, instructions and references & authorized prescriptions . Results of labs will be conveyed to patient, once available. Pt verbalized instructions I provided and expressed understanding of discussion that was held today. Follow-up Disposition:  Return if symptoms worsen or fail to improve.

## 2017-11-18 DIAGNOSIS — J02.9 ALLERGIC PHARYNGITIS: ICD-10-CM

## 2017-11-18 RX ORDER — MONTELUKAST SODIUM 10 MG/1
TABLET ORAL
Qty: 30 TAB | Refills: 0 | Status: SHIPPED | OUTPATIENT
Start: 2017-11-18 | End: 2020-08-11 | Stop reason: ALTCHOICE

## 2020-02-26 ENCOUNTER — APPOINTMENT (OUTPATIENT)
Dept: GENERAL RADIOLOGY | Age: 30
End: 2020-02-26
Attending: EMERGENCY MEDICINE
Payer: MEDICAID

## 2020-02-26 ENCOUNTER — HOSPITAL ENCOUNTER (EMERGENCY)
Age: 30
Discharge: HOME | End: 2020-02-27
Attending: EMERGENCY MEDICINE
Payer: MEDICAID

## 2020-02-26 VITALS
RESPIRATION RATE: 18 BRPM | HEIGHT: 60 IN | BODY MASS INDEX: 33.96 KG/M2 | SYSTOLIC BLOOD PRESSURE: 110 MMHG | OXYGEN SATURATION: 97 % | HEART RATE: 67 BPM | WEIGHT: 173 LBS | TEMPERATURE: 97.8 F | DIASTOLIC BLOOD PRESSURE: 60 MMHG

## 2020-02-26 DIAGNOSIS — M62.838 CERVICAL PARASPINAL MUSCLE SPASM: ICD-10-CM

## 2020-02-26 DIAGNOSIS — R07.9 CHEST PAIN, UNSPECIFIED TYPE: Primary | ICD-10-CM

## 2020-02-26 LAB
ALBUMIN SERPL-MCNC: 3.8 G/DL (ref 3.5–5)
ALBUMIN/GLOB SERPL: 1 {RATIO} (ref 1.1–2.2)
ALP SERPL-CCNC: 154 U/L (ref 45–117)
ALT SERPL-CCNC: 25 U/L (ref 12–78)
ANION GAP SERPL CALC-SCNC: 7 MMOL/L (ref 5–15)
AST SERPL-CCNC: 13 U/L (ref 15–37)
BASOPHILS # BLD: 0.1 K/UL (ref 0–0.1)
BASOPHILS NFR BLD: 1 % (ref 0–1)
BILIRUB SERPL-MCNC: 0.4 MG/DL (ref 0.2–1)
BUN SERPL-MCNC: 9 MG/DL (ref 6–20)
BUN/CREAT SERPL: 16 (ref 12–20)
CALCIUM SERPL-MCNC: 8.6 MG/DL (ref 8.5–10.1)
CHLORIDE SERPL-SCNC: 106 MMOL/L (ref 97–108)
CO2 SERPL-SCNC: 27 MMOL/L (ref 21–32)
CREAT SERPL-MCNC: 0.55 MG/DL (ref 0.55–1.02)
D DIMER PPP FEU-MCNC: <0.19 MG/L FEU (ref 0–0.65)
DIFFERENTIAL METHOD BLD: ABNORMAL
EOSINOPHIL # BLD: 0.4 K/UL (ref 0–0.4)
EOSINOPHIL NFR BLD: 5 % (ref 0–7)
ERYTHROCYTE [DISTWIDTH] IN BLOOD BY AUTOMATED COUNT: 15.9 % (ref 11.5–14.5)
GLOBULIN SER CALC-MCNC: 4 G/DL (ref 2–4)
GLUCOSE SERPL-MCNC: 136 MG/DL (ref 65–100)
HCG UR QL: NEGATIVE
HCT VFR BLD AUTO: 33.1 % (ref 35–47)
HGB BLD-MCNC: 10.1 G/DL (ref 11.5–16)
IMM GRANULOCYTES # BLD AUTO: 0 K/UL (ref 0–0.04)
IMM GRANULOCYTES NFR BLD AUTO: 0 % (ref 0–0.5)
LIPASE SERPL-CCNC: 117 U/L (ref 73–393)
LYMPHOCYTES # BLD: 3.1 K/UL (ref 0.8–3.5)
LYMPHOCYTES NFR BLD: 36 % (ref 12–49)
MCH RBC QN AUTO: 23.3 PG (ref 26–34)
MCHC RBC AUTO-ENTMCNC: 30.5 G/DL (ref 30–36.5)
MCV RBC AUTO: 76.3 FL (ref 80–99)
MONOCYTES # BLD: 0.6 K/UL (ref 0–1)
MONOCYTES NFR BLD: 7 % (ref 5–13)
NEUTS SEG # BLD: 4.6 K/UL (ref 1.8–8)
NEUTS SEG NFR BLD: 51 % (ref 32–75)
NRBC # BLD: 0 K/UL (ref 0–0.01)
NRBC BLD-RTO: 0 PER 100 WBC
PLATELET # BLD AUTO: 295 K/UL (ref 150–400)
PMV BLD AUTO: 10.3 FL (ref 8.9–12.9)
POTASSIUM SERPL-SCNC: 4.2 MMOL/L (ref 3.5–5.1)
PROT SERPL-MCNC: 7.8 G/DL (ref 6.4–8.2)
RBC # BLD AUTO: 4.34 M/UL (ref 3.8–5.2)
SODIUM SERPL-SCNC: 140 MMOL/L (ref 136–145)
TROPONIN I SERPL-MCNC: <0.05 NG/ML
WBC # BLD AUTO: 8.8 K/UL (ref 3.6–11)

## 2020-02-26 PROCEDURE — 99284 EMERGENCY DEPT VISIT MOD MDM: CPT

## 2020-02-26 PROCEDURE — 81025 URINE PREGNANCY TEST: CPT

## 2020-02-26 PROCEDURE — 85025 COMPLETE CBC W/AUTO DIFF WBC: CPT

## 2020-02-26 PROCEDURE — 36415 COLL VENOUS BLD VENIPUNCTURE: CPT

## 2020-02-26 PROCEDURE — 80053 COMPREHEN METABOLIC PANEL: CPT

## 2020-02-26 PROCEDURE — 84484 ASSAY OF TROPONIN QUANT: CPT

## 2020-02-26 PROCEDURE — 85379 FIBRIN DEGRADATION QUANT: CPT

## 2020-02-26 PROCEDURE — 71045 X-RAY EXAM CHEST 1 VIEW: CPT

## 2020-02-26 PROCEDURE — 74011250637 HC RX REV CODE- 250/637: Performed by: EMERGENCY MEDICINE

## 2020-02-26 PROCEDURE — 83690 ASSAY OF LIPASE: CPT

## 2020-02-26 PROCEDURE — 93005 ELECTROCARDIOGRAM TRACING: CPT

## 2020-02-26 PROCEDURE — 74011000250 HC RX REV CODE- 250: Performed by: EMERGENCY MEDICINE

## 2020-02-26 RX ORDER — TRAMADOL HYDROCHLORIDE 50 MG/1
50 TABLET ORAL
Qty: 12 TAB | Refills: 0 | Status: SHIPPED | OUTPATIENT
Start: 2020-02-26 | End: 2020-02-29

## 2020-02-26 RX ORDER — RANITIDINE 150 MG/1
150 TABLET, FILM COATED ORAL 2 TIMES DAILY
Qty: 30 TAB | Refills: 0 | Status: SHIPPED | OUTPATIENT
Start: 2020-02-26 | End: 2020-03-12

## 2020-02-26 RX ADMIN — LIDOCAINE HYDROCHLORIDE 40 ML: 20 SOLUTION ORAL; TOPICAL at 22:30

## 2020-02-27 LAB
ATRIAL RATE: 71 BPM
CALCULATED P AXIS, ECG09: 42 DEGREES
CALCULATED R AXIS, ECG10: 52 DEGREES
CALCULATED T AXIS, ECG11: 43 DEGREES
DIAGNOSIS, 93000: NORMAL
P-R INTERVAL, ECG05: 134 MS
Q-T INTERVAL, ECG07: 388 MS
QRS DURATION, ECG06: 88 MS
QTC CALCULATION (BEZET), ECG08: 421 MS
VENTRICULAR RATE, ECG03: 71 BPM

## 2020-02-27 NOTE — ED TRIAGE NOTES
Pt with c/o left chest/breast and left shoulder pain. Pt states the left shoulder pain started 3 weeks ago, left breast/chest pain started two days ago and is relieved when she presses on her left breast.  Pt denies n/v or sob.

## 2020-02-27 NOTE — ED PROVIDER NOTES
80-year-old female with a history of gastritis, on omeprazole presents with left-sided chest pain and left shoulder/neck pain for 3 days. She states her chest pain feels better when she applies pressure to her left breast and increases when she lets go. Pain is not affected by eating or breathing. Her shoulder neck pain is worse with movement. Currently pain is 8 out of 10. Has not tried anything other than omeprazole. Denies any recent injury, fever, cough, dyspnea, nausea, vomiting. Street is via her  who is serving as . Patient speaks [place]. Chest Pain (Angina)    Pertinent negatives include no abdominal pain, no cough, no dizziness, no fever, no headaches, no nausea and no shortness of breath.    Shoulder Pain           Past Medical History:   Diagnosis Date    Anemia        Past Surgical History:   Procedure Laterality Date    HX APPENDECTOMY           Family History:   Problem Relation Age of Onset    No Known Problems Mother     No Known Problems Father        Social History     Socioeconomic History    Marital status:      Spouse name: Not on file    Number of children: Not on file    Years of education: Not on file    Highest education level: Not on file   Occupational History    Not on file   Social Needs    Financial resource strain: Not on file    Food insecurity:     Worry: Not on file     Inability: Not on file    Transportation needs:     Medical: Not on file     Non-medical: Not on file   Tobacco Use    Smoking status: Never Smoker    Smokeless tobacco: Never Used   Substance and Sexual Activity    Alcohol use: No     Alcohol/week: 0.0 standard drinks    Drug use: No    Sexual activity: Not on file   Lifestyle    Physical activity:     Days per week: Not on file     Minutes per session: Not on file    Stress: Not on file   Relationships    Social connections:     Talks on phone: Not on file     Gets together: Not on file     Attends [employer] service: Not on file     Active member of club or organization: Not on file     Attends meetings of clubs or organizations: Not on file     Relationship status: Not on file    Intimate partner violence:     Fear of current or ex partner: Not on file     Emotionally abused: Not on file     Physically abused: Not on file     Forced sexual activity: Not on file   Other Topics Concern    Not on file   Social History Narrative    Not on file         ALLERGIES: Patient has no known allergies. Review of Systems   Constitutional: Negative. Negative for chills, fever and unexpected weight change. HENT: Negative. Negative for congestion and trouble swallowing. Eyes: Negative for discharge. Respiratory: Negative. Negative for cough, chest tightness and shortness of breath. Cardiovascular: Positive for chest pain. Gastrointestinal: Negative. Negative for abdominal distention, abdominal pain, constipation, diarrhea and nausea. Endocrine: Negative. Genitourinary: Negative. Negative for difficulty urinating, dysuria, frequency and urgency. Musculoskeletal: Positive for arthralgias and neck pain. Negative for myalgias. Skin: Negative. Negative for color change. Allergic/Immunologic: Negative. Neurological: Negative. Negative for dizziness, speech difficulty and headaches. Hematological: Negative. Psychiatric/Behavioral: Negative. Negative for agitation and confusion. All other systems reviewed and are negative. Vitals:    02/26/20 1948   BP: 110/60   Pulse: 67   Resp: 18   Temp: 97.8 °F (36.6 °C)   SpO2: 97%   Weight: 78.5 kg (173 lb)   Height: 5' (1.524 m)            Physical Exam  Vitals signs reviewed. Constitutional:       Appearance: She is well-developed. HENT:      Head: Normocephalic and atraumatic. Eyes:      Conjunctiva/sclera: Conjunctivae normal.   Neck:      Musculoskeletal: Neck supple. Cardiovascular:      Rate and Rhythm: Normal rate and regular rhythm. Pulmonary:      Effort: Pulmonary effort is normal. No respiratory distress. Comments: No reproducible chest wall tenderness  Chest:      Chest wall: No tenderness. Abdominal:      Palpations: Abdomen is soft. Tenderness: There is no abdominal tenderness. Musculoskeletal: Normal range of motion. General: No deformity. Arms:       Comments: Tenderness as diagrammed   Skin:     General: Skin is warm and dry. Neurological:      Mental Status: She is alert and oriented to person, place, and time. Psychiatric:         Behavior: Behavior normal.         Thought Content: Thought content normal.          MDM  Number of Diagnoses or Management Options  Diagnosis management comments: Costochondritis, musculoskeletal chest pain, cervical sprain strain spasm, cervical rib likely related to colopathy, left shoulder arthritis. Given that pain is left lower chest/left upper abdomen, this could be gastritis, reflux, GERD. No dyspnea or tachycardic to suggest PE and no known PE risk factors. ED Course as of Feb 27 2307   Wed Feb 26, 2020   2317 Feeling better after GI cocktail.    [SS]      ED Course User Index  [SS] Yamilka Brady MD       Procedures      LABORATORY TESTS:  No results found for this or any previous visit (from the past 12 hour(s)). IMAGING RESULTS:  XR CHEST PORT   Final Result   IMPRESSION:   No acute process. MEDICATIONS GIVEN:  Medications   mylanta/viscous lidocaine (GI COCKTAIL) (40 mL Oral Given 2/26/20 2230)       IMPRESSION:  1. Chest pain, unspecified type    2. Cervical paraspinal muscle spasm        PLAN:  1. Discharge Medication List as of 2/26/2020 11:20 PM      START taking these medications    Details   raNITIdine (ZANTAC) 150 mg tablet Take 1 Tab by mouth two (2) times a day for 15 days. , Print, Disp-30 Tab, R-0      traMADol (ULTRAM) 50 mg tablet Take 1 Tab by mouth every six (6) hours as needed for Pain for up to 3 days.  Max Daily Amount: 200 mg., Print, Disp-12 Tab, R-0         CONTINUE these medications which have NOT CHANGED    Details   montelukast (SINGULAIR) 10 mg tablet TAKE 1 TABLET BY MOUTH DAILY, Normal, Disp-30 Tab, R-0      medroxyPROGESTERone (PROVERA) 5 mg tablet Take 1 Tab by mouth two (2) times a day. Indications: SECONDARY AMENORRHEA, Normal, Disp-10 Tab, R-0      ergocalciferol (ERGOCALCIFEROL) 50,000 unit capsule Take 1 Cap by mouth every seven (7) days. , Normal, Disp-4 Cap, R-5      pantoprazole (PROTONIX) 40 mg tablet Take 1 Tab by mouth daily. , Normal, Disp-30 Tab, R-2           2.    Follow-up Information     Follow up With Specialties Details Why Contact Info    Ivory Devries MD Family Practice Schedule an appointment as soon as possible for a visit  [address] [facility] [address] [phone]      [facility] EMERGENCY DEPT Emergency Medicine  As needed, If symptoms worsen [address] [facility]  Schedule an appointment as soon as possible for a visit to establish primary care [address]  [facility] [place], [address]  [phone]        Return to ED if worse

## 2020-02-27 NOTE — DISCHARGE INSTRUCTIONS
Patient Education      Patient Education   Patient Education     CONTINUE OMEPRAZOLE AS PREVIOUSLY PRESCRIBED    Neck Spasm: Care Instructions  Your Care Instructions  A neck spasm is sudden tightness and pain in your neck muscles. A spasm may be caused by some activities or repeated movements. For example, you may be more likely to have a neck spasm if you slouch, paint a ceiling, work at a computer, or sleep with your neck twisted. But the cause isn't always clear. Home treatment includes using heat or ice, taking over-the-counter (OTC) pain medicines, and avoiding activities that may lead to neck pain. Gentle stretching, or treatments such as massage or manipulation, may also help ease a neck spasm. For a neck spasm that doesn't get better with home care, your doctor may prescribe medicine. He or she may also suggest exercise or physical therapy to help strengthen or relax your neck muscles. Follow-up care is a key part of your treatment and safety. Be sure to make and go to all appointments, and call your doctor if you are having problems. It's also a good idea to know your test results and keep a list of the medicines you take. How can you care for yourself at home? · To relieve pain, use heat or ice (whichever feels better) on the affected area. ? Put a warm water bottle, a heating pad set on low, or a warm cloth on your neck. Put a thin cloth between the heating pad and your skin. Do not go to sleep with a heating pad on your skin. ? Try ice or a cold pack on the area for 10 to 20 minutes at a time. Put a thin cloth between the ice and your skin. · Ask your doctor if you can take acetaminophen (such as Tylenol) or nonsteroidal anti-inflammatory drugs, such as ibuprofen or naproxen. Your doctor can prescribe stronger medicines if needed. Be safe with medicines. Read and follow all instructions on the label. · Stretch your muscles every day, especially before and after exercise and at bedtime.  Regular stretching can help relax your muscles. · Try to find a pillow and a position in bed that help improve your night's rest.  · Try to stay active. It's best to start activity slowly. If an exercise makes your pain worse, stop doing it. When should you call for help? Call 911 anytime you think you may need emergency care. For example, call if:    · You are unable to move an arm or a leg at all.   [facility] your doctor now or seek immediate medical care if:    · You have new or worse symptoms in your arms, legs, belly, or buttocks. Symptoms may include:  ? Numbness or tingling. ? Weakness. ? Pain.     · You lose bladder or bowel control.    Watch closely for changes in your health, and be sure to contact your doctor if:    · You do not get better as expected. Where can you learn more? Go to http://kaveh-lauren.info/. Enter K203 in the search box to learn more about \"Neck Spasm: Care Instructions. \"  Current as of: June 26, 2019  Content Version: 12.2  © 6994-4210 [employer]. Care instructions adapted under license by [employer] (which disclaims liability or warranty for this information). If you have questions about a medical condition or this instruction, always ask your healthcare professional. [address] any warranty or liability for your use of this information. Gastroesophageal Reflux Disease (GERD): Care Instructions  Your Care Instructions    Gastroesophageal reflux disease (GERD) is the backward flow of stomach acid into the esophagus. The esophagus is the tube that leads from your throat to your stomach. A one-way valve prevents the stomach acid from moving up into this tube. When you have GERD, this valve does not close tightly enough. If you have mild GERD symptoms including heartburn, you may be able to control the problem with antacids or over-the-counter medicine.  Changing your diet, losing weight, and making other lifestyle changes can also help reduce symptoms. Follow-up care is a key part of your treatment and safety. Be sure to make and go to all appointments, and call your doctor if you are having problems. It's also a good idea to know your test results and keep a list of the medicines you take. How can you care for yourself at home? · Take your medicines exactly as prescribed. Call your doctor if you think you are having a problem with your medicine. · Your doctor may recommend over-the-counter medicine. For mild or occasional indigestion, antacids, such as Tums, Gaviscon, Mylanta, or Maalox, may help. Your doctor also may recommend over-the-counter acid reducers, such as Pepcid AC, Tagamet HB, Zantac 75, or Prilosec. Read and follow all instructions on the label. If you use these medicines often, talk with your doctor. · Change your eating habits. ? It's best to eat several small meals instead of two or three large meals. ? After you eat, wait 2 to 3 hours before you lie down. ? Chocolate, mint, and alcohol can make GERD worse. ? Spicy foods, foods that have a lot of acid (like tomatoes and oranges), and coffee can make GERD symptoms worse in some people. If your symptoms are worse after you eat a certain food, you may want to stop eating that food to see if your symptoms get better. · Do not smoke or chew tobacco. Smoking can make GERD worse. If you need help quitting, talk to your doctor about stop-smoking programs and medicines. These can increase your chances of quitting for good. · If you have GERD symptoms at night, raise the head of your bed 6 to 8 inches by putting the frame on blocks or placing a foam wedge under the head of your mattress. (Adding extra pillows does not work.)  · Do not wear tight clothing around your middle. · Lose weight if you need to. Losing just 5 to 10 pounds can help. When should you call for help?   Call your doctor now or seek immediate medical care if:    · You have new or different belly pain.     · Your stools are black and tarlike or have streaks of blood.    Watch closely for changes in your health, and be sure to contact your doctor if:    · Your symptoms have not improved after 2 days.     · Food seems to catch in your throat or chest.   Where can you learn more? Go to http://kaveh-lauren.info/. Enter D679 in the search box to learn more about \"Gastroesophageal Reflux Disease (GERD): Care Instructions. \"  Current as of: November 7, 2018  Content Version: 12.2  © 4771-2784 [employer]. Care instructions adapted under license by [employer] (which disclaims liability or warranty for this information). If you have questions about a medical condition or this instruction, always ask your healthcare professional. [address] any warranty or liability for your use of this information. Chest Pain: Care Instructions  Your Care Instructions    There are many things that can cause chest pain. Some are not serious and will get better on their own in a few days. But some kinds of chest pain need more testing and treatment. Your doctor may have recommended a follow-up visit in the next 8 to 12 hours. If you are not getting better, you may need more tests or treatment. Even though your doctor has released you, you still need to watch for any problems. The doctor carefully checked you, but sometimes problems can develop later. If you have new symptoms or if your symptoms do not get better, get medical care right away. If you have worse or different chest pain or pressure that lasts more than 5 minutes or you passed out (lost consciousness), call 911 or seek other emergency help right away. A medical visit is only one step in your treatment. Even if you feel better, you still need to do what your doctor recommends, such as going to all suggested follow-up appointments and taking medicines exactly as directed.  This will help you recover and help prevent future problems. How can you care for yourself at home? · Rest until you feel better. · Take your medicine exactly as prescribed. Call your doctor if you think you are having a problem with your medicine. · Do not drive after taking a prescription pain medicine. When should you call for help? Call 911 if:    · You passed out (lost consciousness).     · You have severe difficulty breathing.     · You have symptoms of a heart attack. These may include:  ? Chest pain or pressure, or a strange feeling in your chest.  ? Sweating. ? Shortness of breath. ? Nausea or vomiting. ? Pain, pressure, or a strange feeling in your back, neck, jaw, or upper belly or in one or both shoulders or arms. ? Lightheadedness or sudden weakness. ? A fast or irregular heartbeat. After you call 911, the  may tell you to chew 1 adult-strength or 2 to 4 low-dose aspirin. Wait for an ambulance. Do not try to drive yourself.    Call your doctor today if:    · You have any trouble breathing.     · Your chest pain gets worse.     · You are dizzy or lightheaded, or you feel like you may faint.     · You are not getting better as expected.     · You are having new or different chest pain. Where can you learn more? Go to http://kaveh-lauren.info/. Enter A120 in the search box to learn more about \"Chest Pain: Care Instructions. \"  Current as of: June 26, 2019  Content Version: 12.2  © 7304-5249 [employer]. Care instructions adapted under license by [employer] (which disclaims liability or warranty for this information). If you have questions about a medical condition or this instruction, always ask your healthcare professional. [address] any warranty or liability for your use of this information.

## 2020-08-11 ENCOUNTER — OFFICE VISIT (OUTPATIENT)
Dept: PRIMARY CARE CLINIC | Age: 30
End: 2020-08-11
Payer: MEDICAID

## 2020-08-11 VITALS
OXYGEN SATURATION: 99 % | HEIGHT: 62 IN | DIASTOLIC BLOOD PRESSURE: 71 MMHG | HEART RATE: 81 BPM | WEIGHT: 178.9 LBS | RESPIRATION RATE: 18 BRPM | TEMPERATURE: 97 F | SYSTOLIC BLOOD PRESSURE: 123 MMHG | BODY MASS INDEX: 32.92 KG/M2

## 2020-08-11 DIAGNOSIS — N92.6 MISSED PERIODS: Primary | ICD-10-CM

## 2020-08-11 DIAGNOSIS — N91.2 AMENORRHEA: ICD-10-CM

## 2020-08-11 LAB
HCG URINE, QL. (POC): NEGATIVE
VALID INTERNAL CONTROL?: YES

## 2020-08-11 PROCEDURE — 99214 OFFICE O/P EST MOD 30 MIN: CPT | Performed by: FAMILY MEDICINE

## 2020-08-11 PROCEDURE — 81025 URINE PREGNANCY TEST: CPT | Performed by: FAMILY MEDICINE

## 2020-08-11 NOTE — PROGRESS NOTES
Chief Complaint   Patient presents with   [address]     pt has not had a cycle since March 2020       1. Have you been to the ER, urgent care clinic since your last visit? Hospitalized since your last visit? No    2. Have you seen or consulted any other health care providers outside of the [address] [place] [address] since your last visit? Include any pap smears or colon screening.  No

## 2020-08-11 NOTE — PROGRESS NOTES
Subjective:     Chief Complaint   Patient presents with   [address]     pt has not had a cycle since March 2020        She  is a 27y.o. year old female who presents with her  with a c/o irregular period. She is here after three years. Her  is translating for her however she understands [place] and speaks english little. Speaks Margarita. Declined . Patient reports that she had a miscarriage in 2017 and since then she has been having irregular period. Period usually comes every 6-7 months. Has been trying to get pregnant since 2017. She has one 10 y/o child . Patient denies any abdominal pain, HA, N/V, blurry vision, constipation. Pertinent items are noted in HPI. Objective:     Vitals:    08/11/20 1523   BP: 123/71   Pulse: 81   Resp: 18   Temp: 97 °F (36.1 °C)   TempSrc: Temporal   SpO2: 99%   Weight: 178 lb 14.4 oz (81.1 kg)   Height: 5' 1.5\" (1.562 m)       Physical Examination: General appearance - alert, well appearing, and in no distress, oriented to person, place, and time and overweight  Mental status - alert, oriented to person, place, and time, normal mood, behavior, speech, dress, motor activity, and thought processes  Neck - supple, no significant adenopathy, thyroid exam: thyroid is normal in size without nodules or tenderness  Chest - clear to auscultation, no wheezes, rales or rhonchi, symmetric air entry  Heart - normal rate, regular rhythm, normal S1, S2, no murmurs, rubs, clicks or gallops  Abdomen - soft, nontender, nondistended, no masses or organomegaly  Extremities - no pedal edema noted    No Known Allergies   Social History     Socioeconomic History    Marital status:      Spouse name: Not on file    Number of children: Not on file    Years of education: Not on file    Highest education level: Not on file   Tobacco Use    Smoking status: Never Smoker    Smokeless tobacco: Never Used   Substance and Sexual Activity    Alcohol use:  No Alcohol/week: 0.0 standard drinks    Drug use: No      Family History   Problem Relation Age of Onset    No Known Problems Mother     No Known Problems Father       Past Surgical History:   Procedure Laterality Date    HX APPENDECTOMY        Past Medical History:   Diagnosis Date    Anemia            Assessment/ Plan:   Diagnoses and all orders for this visit:    1. Missed periods  -     AMB POC URINE PREGNANCY TEST, VISUAL COLOR COMPARISON is negative.  -     THYROID CASCADE PROFILE  -     PROLACTIN    2. Amenorrhea  -     REFERRAL TO OBSTETRICS AND GYNECOLOGY           Medication risks/benefits/costs/interactions/alternatives discussed with patient. Advised patient to call back or return to office if symptoms worsen/change/persist. If patient cannot reach us or should anything more severe/urgent arise he/she should proceed directly to the nearest emergency department. Discussed expected course/resolution/complications of diagnosis in detail with patient. Patient given a written after visit summary which includes her diagnoses, current medications and vitals. Patient expressed understanding with the diagnosis and plan. Follow-up and Dispositions    · Return in about 1 month (around 9/11/2020), or if symptoms worsen or fail to improve, for complete physical  and fasting blood work. Rica Mark

## 2020-09-08 ENCOUNTER — OFFICE VISIT (OUTPATIENT)
Dept: OBGYN CLINIC | Age: 30
End: 2020-09-08
Payer: MEDICAID

## 2020-09-08 VITALS — SYSTOLIC BLOOD PRESSURE: 122 MMHG | BODY MASS INDEX: 33.27 KG/M2 | DIASTOLIC BLOOD PRESSURE: 82 MMHG | WEIGHT: 179 LBS

## 2020-09-08 DIAGNOSIS — N92.6 MISSED MENSES: ICD-10-CM

## 2020-09-08 DIAGNOSIS — N93.9 ABNORMAL UTERINE BLEEDING (AUB): Primary | ICD-10-CM

## 2020-09-08 DIAGNOSIS — Z01.419 WELL WOMAN EXAM: ICD-10-CM

## 2020-09-08 DIAGNOSIS — N76.0 ACUTE VAGINITIS: ICD-10-CM

## 2020-09-08 PROCEDURE — 99385 PREV VISIT NEW AGE 18-39: CPT | Performed by: OBSTETRICS & GYNECOLOGY

## 2020-09-08 RX ORDER — MEDROXYPROGESTERONE ACETATE 10 MG/1
10 TABLET ORAL DAILY
Qty: 10 TAB | Refills: 0 | Status: SHIPPED | OUTPATIENT
Start: 2020-09-08 | End: 2020-09-18

## 2020-09-08 NOTE — PROGRESS NOTES
Annual exam    Effie Bell is a 27 y.o.  A1 presenting for annual exam. Multiple concerns today:    -AUB --> menses have been irregular since SAB several years ago, occurring roughly every 6 months, LMP was 3/10/20, pt would like to conceive    -vulvovaginal irritation/pruritis --> no new soaps or detergents, denies h/o yeast infx    -dysuria --> intermittent    -intermittent vague pelvic pains    -weight gain since prior pregnancy    Pt is from [place] but has been in [facility] x 6 years. Margarita  required.     Ob/Gyn Hx:   A1 -  x1, SAB x1  LMP-  Menses- irregular  Contraception-no, ttc  STI- no  ?SA-yes    Health maintenance:  Pap-2017 normal HPV-  Gardasil-no    Past Medical History:   Diagnosis Date    Anemia        Past Surgical History:   Procedure Laterality Date    HX APPENDECTOMY         Family History   Problem Relation Age of Onset    No Known Problems Mother     No Known Problems Father        Social History     Socioeconomic History    Marital status:      Spouse name: Not on file    Number of children: Not on file    Years of education: Not on file    Highest education level: Not on file   Occupational History    Not on file   Social Needs    Financial resource strain: Not on file    Food insecurity     Worry: Not on file     Inability: Not on file    Transportation needs     Medical: Not on file     Non-medical: Not on file   Tobacco Use    Smoking status: Never Smoker    Smokeless tobacco: Never Used   Substance and Sexual Activity    Alcohol use: No     Alcohol/week: 0.0 standard drinks    Drug use: No    Sexual activity: Yes     Partners: Male     Birth control/protection: None   Lifestyle    Physical activity     Days per week: Not on file     Minutes per session: Not on file    Stress: Not on file   Relationships    Social connections     Talks on phone: Not on file     Gets together: Not on file     Attends Synagogue service: Not on file     Active member of club or organization: Not on file     Attends meetings of clubs or organizations: Not on file     Relationship status: Not on file    Intimate partner violence     Fear of current or ex partner: Not on file     Emotionally abused: Not on file     Physically abused: Not on file     Forced sexual activity: Not on file   Other Topics Concern    Not on file   Social History Narrative    Not on file       Current Outpatient Medications   Medication Sig Dispense Refill    acetaminophen (TYLENOL 8 HOUR PO) Take  by mouth.          No Known Allergies    Review of Systems - History obtained from the patient  Constitutional: negative for weight loss, fever, night sweats  HEENT: negative for hearing loss, earache, congestion, snoring, sorethroat  CV: negative for chest pain, palpitations, edema  Resp: negative for cough, shortness of breath, wheezing  GI: negative for change in bowel habits, abdominal pain, black or bloody stools  : negative for frequency, dysuria, hematuria, vaginal discharge, +AUB, vulvovaginal pruritis, discharge, pelvic pain, infertility  MSK: negative for back pain, joint pain, muscle pain  Breast: negative for breast lumps, nipple discharge, galactorrhea  Skin :negative for itching, rash, hives  Neuro: negative for dizziness, headache, confusion, weakness  Psych: negative for anxiety, depression, change in mood  Heme/lymph: negative for bleeding, bruising, pallor    Physical Exam    Visit Vitals  /82 (BP 1 Location: Left arm, BP Patient Position: Sitting)   Wt 179 lb (81.2 kg)   BMI 33.27 kg/m²       Constitutional  · Appearance: well-nourished, well developed, alert, in no acute distress, +obese    HENT  · Head and Face: appears normal    Neck  · Inspection/Palpation: normal appearance, no masses or tenderness  · Lymph Nodes: no lymphadenopathy present  · Thyroid: gland size normal, nontender, no nodules or masses present on palpation    Chest  · Respiratory Effort: non-labored breathing  · Auscultation: CTAB, normal breath sounds    Cardiovascular  · Heart:  · Auscultation: regular rate and rhythm without murmur  · Extremities: no peripheral edema    Breasts  · Inspection of Breasts: breasts symmetrical, no skin changes, no discharge present, nipple appearance normal, no skin retraction present  · Palpation of Breasts and Axillae: no masses present on palpation, no breast tenderness  · Axillary Lymph Nodes: no lymphadenopathy present    Gastrointestinal  · Abdominal Examination: abdomen non-tender to palpation, normal bowel sounds, no masses present  · Liver and spleen: no hepatomegaly present, spleen not palpable  · Hernias: no hernias identified    Genitourinary  · External Genitalia: normal appearance for age, no discharge present, no tenderness present, no inflammatory lesions present, no masses present, no atrophy present  · Vagina: normal vaginal vault without central or paravaginal defects, no discharge present, no inflammatory lesions present, no masses present  · Bladder: non-tender to palpation  · Urethra: appears normal  · Cervix: normal   · Uterus: normal size, shape and consistency  · Adnexa: no adnexal tenderness present, no adnexal masses present  · Perineum: perineum within normal limits, no evidence of trauma, no rashes or skin lesions present    Skin  · General Inspection: no rash, no lesions identified    Neurologic/Psychiatric  · Mental Status:  · Orientation: grossly oriented to person, place and time  · Mood and Affect: mood normal, affect appropriate    No results found for this or any previous visit (from the past 12 hour(s)). Assessment/Plan:  27 y.o. Jose Martin Slimmer presenting for annual exam. Multiple concerns today.     Health Maintenance:  -counseled re: diet, exercise, healthy lifestyle, weight loss counseling  -pap/HPV today  -STI screening (endocervix)  -Gardasil - discuss age extension  -referral to PCP    AUB with desire to conceive: suspect anovulatory cycles, UPT neg today  -advise weight loss  -TVUS referral  -labs: TSH, PRL, PCOS labs, FSH, LH, E2  -provera 10 x 10 to induce withdrawal bleed as no menses in > 6 months    vulvovaginal irritation/pruritis:   -nuswab plus  -avoidance of irritants, good vulvovaginal hygiene    dysuria:  -urine dip today without evidence of infection    Pelvic pain:  -TVUS as above  -bowel regimen  -urine dip neg    weight gain:  -TSH    RTC: 2 wks for US or sooner prn    Emily Velasquez MD  9/8/2020  4:48 PM

## 2020-09-08 NOTE — PATIENT INSTRUCTIONS
Pelvic Exam: Care Instructions  Your Care Instructions     When your doctor examines all of your pelvic organs, it's called a pelvic exam. Two good reasons to have this kind of exam are to check for sexually transmitted infections (STIs) and to get a Pap test. A Pap test is also called a Pap smear. It checks for early changes that can lead to cancer of the cervix. Sometimes a pelvic exam is part of a regular checkup. Your doctor may ask you to avoid vaginal sex, tampons, vaginal medicines, vaginal sprays or powders, and douching for 1 to 2 days before the test.  Other times, women have this kind of exam at any time of the month. This is because they have pelvic pain, bleeding, or discharge. Or they may have another pelvic problem. Before your exam, it's important to share some information with your doctor. For example, if you are a survivor of rape or sexual abuse, you can talk about any concerns you may have. Your doctor will also want to know if you are pregnant or use birth control. And he or she will want to hear about any problems, surgeries, or procedures you have had in your pelvic area. You will also need to tell your doctor when your last period was. Follow-up care is a key part of your treatment and safety. Be sure to make and go to all appointments, and call your doctor if you are having problems. It's also a good idea to know your test results and keep a list of the medicines you take. How is a pelvic exam done? · During a pelvic exam, you will:  ? Take off your clothes below the waist. You will get a paper or cloth cover to put over the lower half of your body. If this is regular checkup, you may undress completely and put on a gown. ? Lie on your back on an exam table. Your feet will be raised above you. Stirrups will support your feet. · The doctor will:  ? Ask you to relax your knees. Your knees need to lean out, toward the walls. ?  Check the opening of your vagina for sores or swelling. ? Gently put a tool called a speculum into your vagina. It opens the vagina a little bit. You will feel some pressure. But if you are relaxed, it will not hurt. It lets your doctor see inside the vagina. ? Use a small brush, spatula, or swab to get a sample of cells, if you are having a Pap test or culture. The doctor then removes the speculum. ? Put on gloves and put one or two fingers of one hand into your vagina. The other hand goes on your lower belly. This lets your doctor feel your pelvic organs. You will probably feel some pressure. Try to stay relaxed. ? Put one gloved finger into your rectum and one into your vagina, if needed. This can also help check your pelvic organs. This exam takes about 10 minutes. At the end, you will get a washcloth or tissue to clean your vaginal area. You can then get dressed. Why is a pelvic exam done? A pelvic exam may be done:  · As part of a woman's regular physical checkup. The exam may include a Pap test.  · To check for vaginal infection. · To check for sexually transmitted infections, such as chlamydia or herpes. · To help find the cause of abnormal uterine bleeding. · To look for problems like uterine fibroids, ovarian cysts, or uterine prolapse. · To find the cause of pelvic or belly pain. · Before inserting an intrauterine device (IUD) for birth control. · To collect evidence if you've been sexually assaulted. What are the risks of a pelvic exam?  There is a small chance that the doctor will find something on a pelvic exam that would not have caused a problem. This is called overdiagnosis. It could lead to tests or treatment you don't need. When should you call for help? Watch closely for changes in your health, and be sure to contact your doctor if you have any problems. Where can you learn more? Go to http://www.gray.com/  Enter M421 in the search box to learn more about \"Pelvic Exam: Care Instructions. \"  Current as of: November 8, 2019               Content Version: 12.6  © 7336-4244 [employer]. Care instructions adapted under license by [email or web address] (which disclaims liability or warranty for this information). If you have questions about a medical condition or this instruction, always ask your healthcare professional. [address] any warranty or liability for your use of this information.

## 2020-09-09 LAB
ESTRADIOL SERPL-MCNC: 38 PG/ML
FSH SERPL-ACNC: 6.8 MIU/ML
LH SERPL-ACNC: 12.8 MIU/ML
PROLACTIN SERPL-MCNC: 9.9 NG/ML (ref 4.8–23.3)
TSH SERPL DL<=0.005 MIU/L-ACNC: 0.45 UIU/ML (ref 0.45–4.5)

## 2020-09-11 ENCOUNTER — OFFICE VISIT (OUTPATIENT)
Dept: PRIMARY CARE CLINIC | Age: 30
End: 2020-09-11
Payer: MEDICAID

## 2020-09-11 VITALS
DIASTOLIC BLOOD PRESSURE: 68 MMHG | TEMPERATURE: 98.4 F | RESPIRATION RATE: 14 BRPM | OXYGEN SATURATION: 98 % | SYSTOLIC BLOOD PRESSURE: 108 MMHG | HEIGHT: 60 IN | HEART RATE: 82 BPM | WEIGHT: 180 LBS | BODY MASS INDEX: 35.34 KG/M2

## 2020-09-11 DIAGNOSIS — E66.01 SEVERE OBESITY (HCC): ICD-10-CM

## 2020-09-11 DIAGNOSIS — Z00.00 WELL ADULT ON ROUTINE HEALTH CHECK: ICD-10-CM

## 2020-09-11 DIAGNOSIS — Z00.00 WELL ADULT ON ROUTINE HEALTH CHECK: Primary | ICD-10-CM

## 2020-09-11 DIAGNOSIS — N91.2 AMENORRHEA: ICD-10-CM

## 2020-09-11 LAB
A VAGINAE DNA VAG QL NAA+PROBE: NORMAL SCORE
BVAB2 DNA VAG QL NAA+PROBE: NORMAL SCORE
C ALBICANS DNA VAG QL NAA+PROBE: NEGATIVE
C GLABRATA DNA VAG QL NAA+PROBE: NEGATIVE
C TRACH DNA VAG QL NAA+PROBE: NEGATIVE
MEGA1 DNA VAG QL NAA+PROBE: NORMAL SCORE
N GONORRHOEA DNA VAG QL NAA+PROBE: NEGATIVE
T VAGINALIS DNA VAG QL NAA+PROBE: NEGATIVE

## 2020-09-11 PROCEDURE — 99395 PREV VISIT EST AGE 18-39: CPT | Performed by: FAMILY MEDICINE

## 2020-09-11 NOTE — PROGRESS NOTES
Subjective:   27 y.o. female for Well Woman Check. Her  is translating. Refused . Her gyne and breast care is done elsewhere by her Ob-Gyne physician. She has been following up with Dr. Natalee Ndiaye for amenorrhea. She has an appointment for [facility] and to discuss about her recent ab results with Dr. Natalee Ndiaye on 9/22. Patient Active Problem List   Diagnosis Code    Chronic tension-type headache, not intractable G44.229    Chronic frontal sinusitis J32.1    GERD (gastroesophageal reflux disease) K21.9    Abdominal pain R10.9    Microcytic hypochromic anemia D50.9    Idiopathic urticaria L50.1    Bilateral flank pain R10.9    Female pelvic pain R10.2    Missed period N92.6    Crystalluria R82.998    Acute recurrent maxillary sinusitis J01.01    Body aches R52    H/O miscarriage, not currently pregnant Z87.59    Vaginal bleeding, abnormal N93.9    DUB (dysfunctional uterine bleeding) N93.8    Strain of left trapezius muscle S46.812A    Slow transit constipation K59.01    Left cervical radiculopathy M54.12    Allergic sinusitis J30.9    Secondary amenorrhea N91.1    Vitamin D deficiency E55.9    Severe obesity (HCC) E66.01     Current Outpatient Medications   Medication Sig Dispense Refill    acetaminophen (TYLENOL 8 HOUR PO) Take  by mouth.  medroxyPROGESTERone (PROVERA) 10 mg tablet Take 1 Tab by mouth daily for 10 days. 10 Tab 0     No Known Allergies  Past Medical History:   Diagnosis Date    Anemia              ROS: Feeling generally well. No TIA's or unusual headaches, no dysphagia. No prolonged cough. No dyspnea or chest pain on exertion. No abdominal pain, change in bowel habits, black or bloody stools. No urinary tract symptoms. No new or unusual musculoskeletal symptoms. Specific concerns today: none. Objective: The patient appears well, alert, oriented x 3, in no distress.   Visit Vitals  /68   Pulse 82   Temp 98.4 °F (36.9 °C) (Oral)   Resp 14   Ht 5' (1.524 m)   Wt 180 lb (81.6 kg)   LMP  (LMP Unknown)   SpO2 98%   BMI 35.15 kg/m²     ENT normal.  Neck supple. No adenopathy or thyromegaly. ALEXANDER. Lungs are clear, good air entry, no wheezes, rhonchi or rales. S1 and S2 normal, no murmurs, regular rate and rhythm. Abdomen soft without tenderness, guarding, mass or organomegaly. Extremities show no edema, normal peripheral pulses. Neurological is normal, no focal findings. Breast and Pelvic exams are deferred. Assessment/Plan:   Well Woman  lose weight, increase physical activity, follow low fat diet, follow low salt diet, continue present plan, routine labs ordered, call if any problems    ICD-10-CM ICD-9-CM    1. Well adult on routine health check  Z00.00 V70.0 LIPID PANEL      METABOLIC PANEL, COMPREHENSIVE      CBC WITH AUTOMATED DIFF      HEMOGLOBIN A1C WITH EAG   2. Severe obesity ([facility] [address]  E66.01 278.01    3. Amenorrhea  N91.2 626.0      Diagnoses and all orders for this visit:    1. Well adult on routine health check  -     LIPID PANEL; Future  -     METABOLIC PANEL, COMPREHENSIVE; Future  -     CBC WITH AUTOMATED DIFF; Future  -     HEMOGLOBIN A1C WITH EAG; Future    2. Severe obesity ([address]       Counseled on diet and exercise. 3. Amenorrhea      Being evaluated by Dr. Louis Hair. Other orders  -     CBC WITH AUTOMATED DIFF  -     METABOLIC PANEL, COMPREHENSIVE  -     LIPID PANEL  -     HEMOGLOBIN A1C WITH EAG      Follow-up and Dispositions    · Return if symptoms worsen or fail to improve.        reviewed diet, exercise and weight control

## 2020-09-11 NOTE — PROGRESS NOTES
Chief Complaint   Patient presents with    Follow-up         1. Have you been to the ER, urgent care clinic since your last visit? Hospitalized since your last visit? no    2. Have you seen or consulted any other health care providers outside of the [address] [place] [address] since your last visit? Include any pap smears or colon screening.   no

## 2020-09-12 LAB
ALBUMIN SERPL-MCNC: 4.4 G/DL (ref 3.9–5)
ALBUMIN/GLOB SERPL: 1.5 {RATIO} (ref 1.2–2.2)
ALP SERPL-CCNC: 123 IU/L (ref 39–117)
ALT SERPL-CCNC: 12 IU/L (ref 0–32)
AST SERPL-CCNC: 14 IU/L (ref 0–40)
BASOPHILS # BLD AUTO: 0 X10E3/UL (ref 0–0.2)
BASOPHILS NFR BLD AUTO: 0 %
BILIRUB SERPL-MCNC: 0.6 MG/DL (ref 0–1.2)
BUN SERPL-MCNC: 8 MG/DL (ref 6–20)
BUN/CREAT SERPL: 16 (ref 9–23)
CALCIUM SERPL-MCNC: 9.6 MG/DL (ref 8.7–10.2)
CHLORIDE SERPL-SCNC: 104 MMOL/L (ref 96–106)
CHOLEST SERPL-MCNC: 132 MG/DL (ref 100–199)
CO2 SERPL-SCNC: 20 MMOL/L (ref 20–29)
CREAT SERPL-MCNC: 0.5 MG/DL (ref 0.57–1)
EOSINOPHIL # BLD AUTO: 0.2 X10E3/UL (ref 0–0.4)
EOSINOPHIL NFR BLD AUTO: 2 %
ERYTHROCYTE [DISTWIDTH] IN BLOOD BY AUTOMATED COUNT: 16.9 % (ref 11.7–15.4)
EST. AVERAGE GLUCOSE BLD GHB EST-MCNC: 126 MG/DL
GLOBULIN SER CALC-MCNC: 3 G/DL (ref 1.5–4.5)
GLUCOSE SERPL-MCNC: 142 MG/DL (ref 65–99)
HBA1C MFR BLD: 6 % (ref 4.8–5.6)
HCT VFR BLD AUTO: 33.7 % (ref 34–46.6)
HDLC SERPL-MCNC: 25 MG/DL
HGB BLD-MCNC: 10.4 G/DL (ref 11.1–15.9)
IMM GRANULOCYTES # BLD AUTO: 0 X10E3/UL (ref 0–0.1)
IMM GRANULOCYTES NFR BLD AUTO: 0 %
LDLC SERPL CALC-MCNC: 47 MG/DL (ref 0–99)
LYMPHOCYTES # BLD AUTO: 2 X10E3/UL (ref 0.7–3.1)
LYMPHOCYTES NFR BLD AUTO: 28 %
MCH RBC QN AUTO: 23.2 PG (ref 26.6–33)
MCHC RBC AUTO-ENTMCNC: 30.9 G/DL (ref 31.5–35.7)
MCV RBC AUTO: 75 FL (ref 79–97)
MONOCYTES # BLD AUTO: 0.5 X10E3/UL (ref 0.1–0.9)
MONOCYTES NFR BLD AUTO: 7 %
NEUTROPHILS # BLD AUTO: 4.3 X10E3/UL (ref 1.4–7)
NEUTROPHILS NFR BLD AUTO: 63 %
PLATELET # BLD AUTO: 327 X10E3/UL (ref 150–450)
POTASSIUM SERPL-SCNC: 4.4 MMOL/L (ref 3.5–5.2)
PROT SERPL-MCNC: 7.4 G/DL (ref 6–8.5)
RBC # BLD AUTO: 4.49 X10E6/UL (ref 3.77–5.28)
SODIUM SERPL-SCNC: 136 MMOL/L (ref 134–144)
TRIGL SERPL-MCNC: 398 MG/DL (ref 0–149)
VLDLC SERPL CALC-MCNC: 60 MG/DL (ref 5–40)
WBC # BLD AUTO: 7.1 X10E3/UL (ref 3.4–10.8)

## 2020-09-13 PROBLEM — F32.A MILD DEPRESSION: Status: RESOLVED | Noted: 2017-04-24 | Resolved: 2020-09-13

## 2020-09-14 LAB
CYTOLOGIST CVX/VAG CYTO: NORMAL
CYTOLOGY CVX/VAG DOC CYTO: NORMAL
CYTOLOGY CVX/VAG DOC THIN PREP: NORMAL
DX ICD CODE: NORMAL
LABCORP, 190119: NORMAL
Lab: NORMAL
Lab: NORMAL
OTHER STN SPEC: NORMAL
STAT OF ADQ CVX/VAG CYTO-IMP: NORMAL

## 2020-09-16 NOTE — PROGRESS NOTES
Called patient to schedule appointment to discuss labs.  translated stated patient has already made an appointment. I tried to explain several times that appointment is with another doctor however he was not understanding.

## 2020-09-18 DIAGNOSIS — D64.9 LOW HEMOGLOBIN: Primary | ICD-10-CM

## 2020-09-18 RX ORDER — FERROUS SULFATE 325(65) MG
325 TABLET, DELAYED RELEASE (ENTERIC COATED) ORAL DAILY
Qty: 90 TAB | Refills: 0 | Status: SHIPPED | OUTPATIENT
Start: 2020-09-18 | End: 2021-05-07

## 2020-09-18 NOTE — PROGRESS NOTES
Please mail letter:    1. Hemoglobin level is low. Advising to take iron supplement. Prescription sent to pharmacy. Follow up in 3 months. 2. Triglyceride level is elevated. Please work on daily exercise, avoid fatty murguia food. Will recheck fasting blood work in three months. Make an appointment in 3 months. 3. Prediabetes: Average blood sugar( Hg A1c ) is in the range of prediabetic level. Prediabetes is a warning sign that you are at risk for getting type 2 diabetes. It means that your blood sugar is higher than it should be. Most people who get type 2 diabetes have prediabetes first. The good news is that lifestyle changes may help you get your blood sugar back to normal and avoid or delay diabetes. Will recheck this level in 6 months. 4. Kidney and liver function is stable.

## 2020-09-22 ENCOUNTER — OFFICE VISIT (OUTPATIENT)
Dept: OBGYN CLINIC | Age: 30
End: 2020-09-22
Payer: MEDICAID

## 2020-09-22 VITALS
HEIGHT: 60 IN | WEIGHT: 178 LBS | BODY MASS INDEX: 34.95 KG/M2 | SYSTOLIC BLOOD PRESSURE: 122 MMHG | DIASTOLIC BLOOD PRESSURE: 70 MMHG

## 2020-09-22 DIAGNOSIS — Z31.69 INFERTILITY COUNSELING: ICD-10-CM

## 2020-09-22 DIAGNOSIS — E28.2 PCOS (POLYCYSTIC OVARIAN SYNDROME): ICD-10-CM

## 2020-09-22 DIAGNOSIS — R73.03 PREDIABETES: ICD-10-CM

## 2020-09-22 DIAGNOSIS — E66.01 SEVERE OBESITY (HCC): ICD-10-CM

## 2020-09-22 DIAGNOSIS — E88.81 METABOLIC SYNDROME: ICD-10-CM

## 2020-09-22 DIAGNOSIS — N91.2 AMENORRHEA: Primary | ICD-10-CM

## 2020-09-22 DIAGNOSIS — E78.1 HYPERTRIGLYCERIDEMIA: ICD-10-CM

## 2020-09-22 PROCEDURE — 76830 TRANSVAGINAL US NON-OB: CPT | Performed by: OBSTETRICS & GYNECOLOGY

## 2020-09-22 PROCEDURE — 99213 OFFICE O/P EST LOW 20 MIN: CPT | Performed by: OBSTETRICS & GYNECOLOGY

## 2020-09-22 NOTE — PROGRESS NOTES
Follow Up    Bethany Gregg is a 27 y.o.  A1 presenting for ultrasound and follow up of AUB. Menses have been irregular since SAB several years ago, occurring roughly every 6 months, LMP was 3/10/20, pt would like to conceive. Ultrasound today showing possible polyp vs. Blood clot, and polycystic appearance to ovaries. Reports she has had weight gain over the past few years. No concerns with hirsuitism or acne. Since prior visit, pt took Provera as prescribed and began menses last night. Normal flow. Labs from prior visit: TSH, PRL, FSH, LH, E2 all wnl. It does not appear that PCOS labs were drawn at that visit. Recent A1c 6.0 in pre-diabetic range and pt with elevated triglycerides. [facility] is from [place] [facility] Margarita  required. TRANSVAGINAL ULTRASOUND PERFORMED 20. THE UTERUS IS ANTEVERTED,NORMAL IN SIZE AND ECHOGENICITY. ENDOMETRIUM MEASURES 10MM IN THICKNESS. THERE APPEARS TO BE A HYPERECHOIC AREA SEEN  WITHIN THE ENDOMETRIUM MEASURING 15 X 12MM, POSSIBLE POLYP VS. [place]. CLINICAL  COORELATION RECOMMENDED. THE RIGHT OVARY APPEARS TO HAVE MULTIPLE FOLLICLES SEEN ALONG THE PERIPHERY. THE LEFT OVARY APPEARS TO HAVE MULTIPLE FOLLICLES SEEN ALONG THE PERIPHERY. NO FREE FLUID SEEN IN THE CDS.     Ob/Gyn Hx:   A1 -  x1, SAB x1  LMP-20 with provera  Menses- irregular  Contraception-no, ttc  STI- no  ?SA-yes    Health maintenance:  Pap-20 NILM  Gardasil-no    Past Medical History:   Diagnosis Date    Anemia        Past Surgical History:   Procedure Laterality Date    HX APPENDECTOMY         Family History   Problem Relation Age of Onset    No Known Problems Mother     No Known Problems Father        Social History     Socioeconomic History    Marital status:      Spouse name: Not on file    Number of children: Not on file    Years of education: Not on file    Highest education level: Not on file   Occupational History    Not on file   Social Needs    Financial resource strain: Not on file    Food insecurity     Worry: Not on file     Inability: Not on file    Transportation needs     Medical: Not on file     Non-medical: Not on file   Tobacco Use    Smoking status: Never Smoker    Smokeless tobacco: Never Used   Substance and Sexual Activity    Alcohol use: No     Alcohol/week: 0.0 standard drinks    Drug use: No    Sexual activity: Yes     Partners: Male     Birth control/protection: None   Lifestyle    Physical activity     Days per week: Not on file     Minutes per session: Not on file    Stress: Not on file   Relationships    Social connections     Talks on phone: Not on file     Gets together: Not on file     Attends Jain service: Not on file     Active member of club or organization: Not on file     Attends meetings of clubs or organizations: Not on file     Relationship status: Not on file    Intimate partner violence     Fear of current or ex partner: Not on file     Emotionally abused: Not on file     Physically abused: Not on file     Forced sexual activity: Not on file   Other Topics Concern    Not on file   Social History Narrative    Not on file       Current Outpatient Medications   Medication Sig Dispense Refill    ferrous sulfate (IRON) 325 mg (65 mg iron) EC tablet Take 1 Tab by mouth daily. Take with food. 90 Tab 0    acetaminophen (TYLENOL 8 HOUR PO) Take  by mouth.          No Known Allergies    Review of Systems - History obtained from the patient  Constitutional: negative for weight loss, fever, night sweats  HEENT: negative for hearing loss, earache, congestion, snoring, sorethroat  CV: negative for chest pain, palpitations, edema  Resp: negative for cough, shortness of breath, wheezing  GI: negative for change in bowel habits, abdominal pain, black or bloody stools  : negative for frequency, dysuria, hematuria, vaginal discharge, +AUB, pelvic pain, infertility  MSK: negative for back pain, joint pain, muscle pain  Breast: negative for breast lumps, nipple discharge, galactorrhea  Skin :negative for itching, rash, hives  Neuro: negative for dizziness, headache, confusion, weakness  Psych: negative for anxiety, depression, change in mood  Heme/lymph: negative for bleeding, bruising, pallor    Physical Exam  Visit Vitals  /70 (BP 1 Location: Left arm, BP Patient Position: Sitting)   Ht 5' (1.524 m)   Wt 178 lb (80.7 kg)   LMP 09/21/2020   BMI 34.76 kg/m²     Constitutional  · Appearance: well-nourished, well developed, alert, in no acute distress, +obese    HENT  · Head and Face: appears normal    Neck  · Inspection/Palpation: normal appearance, no masses or tenderness  · Lymph Nodes: no lymphadenopathy present  · Thyroid: gland size normal, nontender, no nodules or masses present on palpation    Chest  · Respiratory Effort: non-labored breathing  · Auscultation: CTAB, normal breath sounds    Cardiovascular  · Heart:  · Auscultation: regular rate and rhythm without murmur  · Extremities: no peripheral edema    Gastrointestinal  · Abdominal Examination: abdomen non-tender to palpation, normal bowel sounds, no masses present  · Liver and spleen: no hepatomegaly present, spleen not palpable  · Hernias: no hernias identified    Skin  · General Inspection: no rash, no lesions identified    Neurologic/Psychiatric  · Mental Status:  · Orientation: grossly oriented to person, place and time  · Mood and Affect: mood normal, affect appropriate      Assessment/Plan:  27 [facility] with desire to conceive. Suspect anovulatory cycles due to PCOS and likely concurrent metabolic syndrome given obesity, elevated TGs and preDM.      -reviewed TVUS findings including polycystic appearance to ovaries and possible polyp vs. Clot  -advised repeat US with possible SIS after menses (in 1 wk)  -reviewed labs from prior visit wnl  -PCOS labs today  -advise weight loss  -advised ongoing follow up with PCP to manage XOL and preDM  -advised initiation of PNV  -menstrual calendar  -can consider letrazole for ovulation induction once medical conditions optimized and polyp excluded    RTC: 1 week for repeat US and possible SIS    Jose Shaw MD  9/22/2020  4:50 PM

## 2020-09-22 NOTE — PATIENT INSTRUCTIONS
Abnormal Uterine Bleeding: Care Instructions Your Care Instructions Abnormal uterine bleeding is irregular bleeding from the uterus that is longer or heavier than usual or does not occur at your regular time. Sometimes it is caused by changes in hormone levels. It can also be caused by growths in the uterus, such as fibroids or polyps. Sometimes a cause cannot be found. You may have heavy bleeding when you are not expecting your period. Your doctor may suggest a pregnancy test, if you think you are pregnant. Follow-up care is a key part of your treatment and safety. Be sure to make and go to all appointments, and call your doctor if you are having problems. It's also a good idea to know your test results and keep a list of the medicines you take. How can you care for yourself at home? · Be safe with medicines. Take pain medicines exactly as directed. ? If the doctor gave you a prescription medicine for pain, take it as prescribed. ? If you are not taking a prescription pain medicine, ask your doctor if you can take an over-the-counter medicine. · You may be low in iron because of blood loss. Eat a balanced diet that is high in iron and vitamin C. Foods rich in iron include red meat, shellfish, eggs, beans, and leafy green vegetables. Talk to your doctor about whether you need to take iron pills or a multivitamin. When should you call for help? Call 911 anytime you think you may need emergency care. For example, call if: 
  · You passed out (lost consciousness). Call your doctor now or seek immediate medical care if: 
  · You have new or worse belly or pelvic pain.  
  · You have severe vaginal bleeding.  
  · You feel dizzy or lightheaded, or you feel like you may faint. Watch closely for changes in your health, and be sure to contact your doctor if: 
  · You think you may be pregnant.  
  · Your bleeding gets worse.  
  · You do not get better as expected. Where can you learn more? Go to http://kaveh-lauren.info/ Enter M571 in the search box to learn more about \"Abnormal Uterine Bleeding: Care Instructions. \" Current as of: November 8, 2019               Content Version: 12.6 © 4767-3524 [employer]. Care instructions adapted under license by [employer] (which disclaims liability or warranty for this information). If you have questions about a medical condition or this instruction, always ask your healthcare professional. [address] any warranty or liability for your use of this information.

## 2020-09-25 LAB
17OHP SERPL-MCNC: 17 NG/DL
DHEA SERPL-MCNC: 137 NG/DL (ref 31–701)
DHEA-S SERPL-MCNC: 118 UG/DL (ref 84.8–378)
TESTOST FREE SERPL-MCNC: 0.7 PG/ML (ref 0–4.2)
TESTOST SERPL-MCNC: 14 NG/DL (ref 8–48)

## 2020-10-06 ENCOUNTER — OFFICE VISIT (OUTPATIENT)
Dept: OBGYN CLINIC | Age: 30
End: 2020-10-06
Payer: MEDICAID

## 2020-10-06 DIAGNOSIS — N97.0 INFERTILITY ASSOCIATED WITH ANOVULATION: ICD-10-CM

## 2020-10-06 DIAGNOSIS — R73.03 PREDIABETES: ICD-10-CM

## 2020-10-06 DIAGNOSIS — E66.01 SEVERE OBESITY (HCC): ICD-10-CM

## 2020-10-06 DIAGNOSIS — N92.6 IRREGULAR MENSES: ICD-10-CM

## 2020-10-06 DIAGNOSIS — E28.2 PCOS (POLYCYSTIC OVARIAN SYNDROME): Primary | ICD-10-CM

## 2020-10-06 PROCEDURE — 76831 ECHO EXAM UTERUS: CPT | Performed by: OBSTETRICS & GYNECOLOGY

## 2020-10-06 PROCEDURE — 99213 OFFICE O/P EST LOW 20 MIN: CPT | Performed by: OBSTETRICS & GYNECOLOGY

## 2020-10-06 RX ORDER — METFORMIN HYDROCHLORIDE 500 MG/1
500 TABLET ORAL 2 TIMES DAILY WITH MEALS
Qty: 60 TAB | Refills: 11 | Status: SHIPPED | OUTPATIENT
Start: 2020-10-06 | End: 2020-10-06 | Stop reason: SDUPTHER

## 2020-10-06 RX ORDER — METFORMIN HYDROCHLORIDE 500 MG/1
500 TABLET ORAL 2 TIMES DAILY WITH MEALS
Qty: 60 TAB | Refills: 11 | Status: SHIPPED | OUTPATIENT
Start: 2020-10-06 | End: 2020-11-05

## 2020-10-06 NOTE — PROGRESS NOTES
Follow Up    Gage Hidalgo is a 27 y.o.  A1 presenting for SIS and follow up of AUB (oligomenorrhea). Menses have been irregular since SAB several years ago, occurring roughly every 6 months. Pt would like to conceive. Ultrasound at prior visit showing possible polyp vs. Blood clot, and polycystic appearance to ovaries. Reports she has had weight gain over the past few years. No concerns with hirsuitism or acne. Requesting medication to help regulate her menses. Pt recently with appropriate withdrawal bleed following Provera. Labs from prior visit: TSH, PRL, PCOS labs, FSH, LH, E2 all wnl. Recent A1c 6.0 in pre-diabetic range and pt with elevated triglycerides. Pt is from [place] but has been in [facility] x 6 years. Margarita  required. TRANSVAGINAL ULTRASOUND PERFORMED 10/6/20  UTERUS IS UNSTABLE IN LIE,NORMAL IN SIZE AND ECHOGENICITY. ENDOMETRIUM MEASURES 7-8MM IN THICKNESS. NO EVIDENCE OFMASSES OR ABNORMALITIES ARE  SEEN. THE SIS WAS COMPLETED. FOLLOWING INSERTION OF THE CATHETER INTO THE UTERUS, AND  INJECTED OF SALINE INTO THE CAVITY. NO MASSES OR POLYPS WERE SEEN. THE RIGHT OVARY APPEARS TO HAVE MULTIPLE FOLLICLES SEEN ALONG THE PERIPHERY. THE LEFT OVARY APPEARS TO HAVE MULTIPLE FOLLICLES SEEN ALONG THE PERIPHERY. FREE FLUID SEEN IN THE CDS MEASURING 18 X 14. TRANSVAGINAL ULTRASOUND PERFORMED 20. THE UTERUS IS ANTEVERTED,NORMAL IN SIZE AND ECHOGENICITY. ENDOMETRIUM MEASURES 10MM IN THICKNESS. THERE APPEARS TO BE A HYPERECHOIC AREA SEEN  WITHIN THE ENDOMETRIUM MEASURING 15 X 12MM, POSSIBLE POLYP VS. [place] . CLINICAL  COORELATION RECOMMENDED. THE RIGHT OVARY APPEARS TO HAVE MULTIPLE FOLLICLES SEEN ALONG THE PERIPHERY. THE LEFT OVARY APPEARS TO HAVE MULTIPLE FOLLICLES SEEN ALONG THE PERIPHERY. NO FREE FLUID SEEN IN THE CDS.     Ob/Gyn Hx:   A1 -  x1, SAB x1  LMP-20 with provera  Menses- irregular  Contraception-no, ttc  STI- no  ?SA-yes    Health maintenance:  Pap-9/8/20 NILM  Gardasil-no    Past Medical History:   Diagnosis Date    Anemia        Past Surgical History:   Procedure Laterality Date    HX APPENDECTOMY         Family History   Problem Relation Age of Onset    No Known Problems Mother     No Known Problems Father        Social History     Socioeconomic History    Marital status:      Spouse name: Not on file    Number of children: Not on file    Years of education: Not on file    Highest education level: Not on file   Occupational History    Not on file   Social Needs    Financial resource strain: Not on file    Food insecurity     Worry: Not on file     Inability: Not on file    Transportation needs     Medical: Not on file     Non-medical: Not on file   Tobacco Use    Smoking status: Never Smoker    Smokeless tobacco: Never Used   Substance and Sexual Activity    Alcohol use: No     Alcohol/week: 0.0 standard drinks    Drug use: No    Sexual activity: Yes     Partners: Male     Birth control/protection: None   Lifestyle    Physical activity     Days per week: Not on file     Minutes per session: Not on file    Stress: Not on file   Relationships    Social connections     Talks on phone: Not on file     Gets together: Not on file     Attends Restoration service: Not on file     Active member of club or organization: Not on file     Attends meetings of clubs or organizations: Not on file     Relationship status: Not on file    Intimate partner violence     Fear of current or ex partner: Not on file     Emotionally abused: Not on file     Physically abused: Not on file     Forced sexual activity: Not on file   Other Topics Concern    Not on file   Social History Narrative    Not on file       Current Outpatient Medications   Medication Sig Dispense Refill    ferrous sulfate (IRON) 325 mg (65 mg iron) EC tablet Take 1 Tab by mouth daily. Take with food. 90 Tab 0    acetaminophen (TYLENOL 8 HOUR PO) Take  by mouth. No Known Allergies    Review of Systems - History obtained from the patient  Constitutional: negative for weight loss, fever, night sweats  HEENT: negative for hearing loss, earache, congestion, snoring, sorethroat  CV: negative for chest pain, palpitations, edema  Resp: negative for cough, shortness of breath, wheezing  GI: negative for change in bowel habits, abdominal pain, black or bloody stools  : negative for frequency, dysuria, hematuria, vaginal discharge, +AUB, pelvic pain, infertility  MSK: negative for back pain, joint pain, muscle pain  Breast: negative for breast lumps, nipple discharge, galactorrhea  Skin :negative for itching, rash, hives  Neuro: negative for dizziness, headache, confusion, weakness  Psych: negative for anxiety, depression, change in mood  Heme/lymph: negative for bleeding, bruising, pallor    Physical Exam  Visit Vitals  LMP 09/21/2020     Constitutional  · Appearance: well-nourished, well developed, alert, in no acute distress    HENT  · Head and Face: appears normal    Neck  · Inspection/Palpation: normal appearance, no masses or tenderness  · Lymph Nodes: no lymphadenopathy present  · Thyroid: gland size normal, nontender, no nodules or masses present on palpation    Chest  · Respiratory Effort: breathing labored  · Auscultation: normal breath sounds    Cardiovascular  · Heart:  · Auscultation: regular rate and rhythm without murmur    Gastrointestinal  · Abdominal Examination: abdomen non-tender to palpation, normal bowel sounds, no masses present  · Liver and spleen: no hepatomegaly present, spleen not palpable  · Hernias: no hernias identified    Genitourinary  · External Genitalia: normal appearance for age, no discharge present, no tenderness present, no inflammatory lesions present, no masses present, no atrophy present  · Vagina: normal vaginal vault without central or paravaginal defects, no discharge present, no inflammatory lesions present, no masses present  · Bladder: non-tender to palpation  · Urethra: appears normal  · Cervix: normal   · Uterus: normal size, shape and consistency  · Adnexa: no adnexal tenderness present, no adnexal masses present  · Perineum: perineum within normal limits, no evidence of trauma, no rashes or skin lesions present  · Anus: anus within normal limits, no hemorrhoids present  · Inguinal Lymph Nodes: no lymphadenopathy present    Skin  · General Inspection: no rash, no lesions identified    Neurologic/Psychiatric  · Mental Status:  · Orientation: grossly oriented to person, place and time  · Mood and Affect: mood normal, affect appropriate      Assessment/Plan:  27 y.o. Freya Tamayo presenting for follow up of AUB with oligomenorrhea and desire to conceive. Likely PCOS given polycystic appearance to ovaries, though no clinical or lab signs of hyperandrogenism. Anovulatory cycles due to obesity also possible. Likely metabolic syndrome given elevated TGs and preDM. No evidence of polyp on SIS today. -reviewed SIS findings with pt today, reassurance that no evidence of polyp  -reviewed PCOS labs from prior visit, wnl  -advised increased efforts at weight loss, discussed diet/exercise, informed pt that weight loss can often help menses return spontaneously  -advised ongoing follow up with PCP to manage XOL and preDM  -Rx for metformin 500mg BID today given likely insulin resistance, and feel that this may help with resumption of normal menses  -advised initiation of PNV, toxin avoidance  -menstrual calendar  -can consider letrazole for ovulation induction once medical conditions optimized    RTC: 3 months for follow up, or sooner karime Mejia MD  10/6/2020  4:18 PM             Sonohysterography procedure    Eron Marquez is a ,  27 y.o. female  Patient's last menstrual period was 2020. She presents for a sonohysterography. The indications for this procedure were reviewed with the patient.  The procedure was explained in detail and all questions were answered. Procedure: The patient was placed in the lithotomy position. A graves speculum was introduced into the vagina and the cervix was visualized. The cervix was prepped with iodine solution. A Fusebill's Hysterography catheter was then introduced into the uterine cavity and the speculum was removed. Sterile sonohysterography with 3D Reconstruction was performed. The endometrial cavity was distended with sterile saline. The patient tolerated the procedure well without complication, and was discharged to home. The findings are as follows:     TRANSVAGINAL ULTRASOUND PERFORMED. UTERUS IS UNSTABLE IN LIE,NORMAL IN SIZE AND ECHOGENICITY. ENDOMETRIUM MEASURES 7-8MM IN THICKNESS. NO EVIDENCE OFMASSES OR ABNORMALITIES ARE  SEEN. THE SIS WAS COMPLETED. FOLLOWING INSERTION OF THE CATHETER INTO THE UTERUS, AND  INJECTED OF SALINE INTO THE CAVITY. NO MASSES OR POLYPS WERE SEEN. THE RIGHT OVARY APPEARS TO HAVE MULTIPLE FOLLICLES SEEN ALONG THE PERIPHERY. THE LEFT OVARY APPEARS TO HAVE MULTIPLE FOLLICLES SEEN ALONG THE PERIPHERY. FREE FLUID SEEN IN THE CDS MEASURING 18 X 14.

## 2021-01-06 ENCOUNTER — OFFICE VISIT (OUTPATIENT)
Dept: OBGYN CLINIC | Age: 31
End: 2021-01-06
Payer: MEDICAID

## 2021-01-06 VITALS
BODY MASS INDEX: 34.95 KG/M2 | WEIGHT: 178 LBS | HEIGHT: 60 IN | SYSTOLIC BLOOD PRESSURE: 122 MMHG | DIASTOLIC BLOOD PRESSURE: 76 MMHG

## 2021-01-06 DIAGNOSIS — N93.8 DUB (DYSFUNCTIONAL UTERINE BLEEDING): ICD-10-CM

## 2021-01-06 DIAGNOSIS — N97.9 INFERTILITY, FEMALE: ICD-10-CM

## 2021-01-06 DIAGNOSIS — E28.2 PCOS (POLYCYSTIC OVARIAN SYNDROME): ICD-10-CM

## 2021-01-06 DIAGNOSIS — E66.01 SEVERE OBESITY (HCC): Primary | ICD-10-CM

## 2021-01-06 DIAGNOSIS — R73.03 PRE-DIABETES: ICD-10-CM

## 2021-01-06 PROCEDURE — 99214 OFFICE O/P EST MOD 30 MIN: CPT | Performed by: OBSTETRICS & GYNECOLOGY

## 2021-01-06 RX ORDER — METFORMIN HYDROCHLORIDE 500 MG/1
500 TABLET ORAL 2 TIMES DAILY WITH MEALS
Qty: 60 TAB | Refills: 11 | Status: SHIPPED | OUTPATIENT
Start: 2021-01-06 | End: 2021-05-04 | Stop reason: ALTCHOICE

## 2021-01-06 RX ORDER — MEDROXYPROGESTERONE ACETATE 10 MG/1
10 TABLET ORAL DAILY
Qty: 10 TAB | Refills: 11 | Status: SHIPPED | OUTPATIENT
Start: 2021-01-06 | End: 2021-01-07

## 2021-01-06 NOTE — PROGRESS NOTES
Follow Up    Cherylene Juba is a 27 y.o.  A1 presenting for follow up of AUB (oligomenorrhea). Menses have been irregular since SAB several years ago, occurring roughly every 6 months. Pt would like to conceive. Reports she has had weight gain over the past few years. No concerns with hirsuitism or acne. Prior US showing polycystic appearance to ovaries. Labs from prior visit: TSH, PRL, PCOS labs, FSH, LH, E2 all wnl. Recent A1c 6.0 in pre-diabetic range and pt with elevated triglycerides. BMI 34. At prior visit, pt was prescribed cyclic provera and metformin, but pt has not been taking theses, interested in starting them at this time. She has not had menses since November cycle, which was induced with Provera. Pt is from [place] but has been in [facility] x 6 years. Margarita  required.     Ob/Gyn Hx:   A1 -  x1, SAB x1  LMP-20  Menses- irregular  Contraception-no, ttc  STI- no  ?SA-yes    Health maintenance:  Pap-20 NILM  Gardasil-denies    Past Medical History:   Diagnosis Date    Anemia        Past Surgical History:   Procedure Laterality Date    HX APPENDECTOMY         Family History   Problem Relation Age of Onset    No Known Problems Mother     No Known Problems Father        Social History     Socioeconomic History    Marital status:      Spouse name: Not on file    Number of children: Not on file    Years of education: Not on file    Highest education level: Not on file   Occupational History    Not on file   Social Needs    Financial resource strain: Not on file    Food insecurity     Worry: Not on file     Inability: Not on file    Transportation needs     Medical: Not on file     Non-medical: Not on file   Tobacco Use    Smoking status: Never Smoker    Smokeless tobacco: Never Used   Substance and Sexual Activity    Alcohol use: No     Alcohol/week: 0.0 standard drinks    Drug use: No    Sexual activity: Yes     Partners: Male     Birth control/protection: None   Lifestyle    Physical activity     Days per week: Not on file     Minutes per session: Not on file    Stress: Not on file   Relationships    Social connections     Talks on phone: Not on file     Gets together: Not on file     Attends Mormon service: Not on file     Active member of club or organization: Not on file     Attends meetings of clubs or organizations: Not on file     Relationship status: Not on file    Intimate partner violence     Fear of current or ex partner: Not on file     Emotionally abused: Not on file     Physically abused: Not on file     Forced sexual activity: Not on file   Other Topics Concern    Not on file   Social History Narrative    Not on file       Current Outpatient Medications   Medication Sig Dispense Refill    ferrous sulfate (IRON) 325 mg (65 mg iron) EC tablet Take 1 Tab by mouth daily. Take with food. 90 Tab 0    acetaminophen (TYLENOL 8 HOUR PO) Take  by mouth.          No Known Allergies    Review of Systems - History obtained from the patient  Constitutional: negative for weight loss, fever, night sweats  HEENT: negative for hearing loss, earache, congestion, snoring, sorethroat  CV: negative for chest pain, palpitations, edema  Resp: negative for cough, shortness of breath, wheezing  GI: negative for change in bowel habits, abdominal pain, black or bloody stools  : negative for frequency, dysuria, hematuria, vaginal discharge, +AUB, pelvic pain, infertility  MSK: negative for back pain, joint pain, muscle pain  Breast: negative for breast lumps, nipple discharge, galactorrhea  Skin :negative for itching, rash, hives  Neuro: negative for dizziness, headache, confusion, weakness  Psych: negative for anxiety, depression, change in mood  Heme/lymph: negative for bleeding, bruising, pallor    Physical Exam  Visit Vitals  /76   Ht 5' (1.524 m)   Wt 178 lb (80.7 kg)   LMP 11/04/2020   BMI 34.76 kg/m²     Constitutional  · Appearance: well-nourished, well developed, alert, in no acute distress    HENT  · Head and Face: appears normal    Chest  · Respiratory Effort: breathing labored  · Auscultation: normal breath sounds    Cardiovascular  · Heart:  · Auscultation: regular rate and rhythm without murmur    Gastrointestinal  · Abdominal Examination: abdomen non-tender to palpation, normal bowel sounds, no masses present  · Liver and spleen: no hepatomegaly present, spleen not palpable  · Hernias: no hernias identified    Skin  · General Inspection: no rash, no lesions identified    Neurologic/Psychiatric  · Mental Status:  · Orientation: grossly oriented to person, place and time  · Mood and Affect: mood normal, affect appropriate      Assessment/Plan:  27 y.o. Alexi Millard presenting for follow up of AUB with oligomenorrhea and desire to conceive. Likely PCOS given polycystic appearance to ovaries, though no clinical or lab signs of hyperandrogenism. Anovulatory cycles due to obesity also possible.  Likely metabolic syndrome given obesity, elevated TGs and preDM.     -advised increased efforts at weight loss, discussed diet/exercise, informed pt that weight loss can often help menses return spontaneously  -advised ongoing follow up with PCP to manage XOL and preDM   -begin cycle regulation with cyclic provera 20OD x 77S per month  -Rx for metformin 500mg BID today given likely insulin resistance, and feel that this may help with resumption of normal menses  -repeat A1c at next visit if pt has not done this with PCP  -advised initiation of PNV, toxin avoidance  -menstrual calendar  -can consider letrazole for ovulation induction once medical conditions optimized    RTC: 3 months for follow up, or sooner karime Salazar MD  1/6/2021  11:58 AM

## 2021-01-06 NOTE — PATIENT INSTRUCTIONS
Abnormal Uterine Bleeding: Care Instructions Your Care Instructions Abnormal uterine bleeding is irregular bleeding from the uterus that is longer or heavier than usual or does not occur at your regular time. Sometimes it is caused by changes in hormone levels. It can also be caused by growths in the uterus, such as fibroids or polyps. Sometimes a cause cannot be found. You may have heavy bleeding when you are not expecting your period. Your doctor may suggest a pregnancy test, if you think you are pregnant. Follow-up care is a key part of your treatment and safety. Be sure to make and go to all appointments, and call your doctor if you are having problems. It's also a good idea to know your test results and keep a list of the medicines you take. How can you care for yourself at home? · Be safe with medicines. Take pain medicines exactly as directed. ? If the doctor gave you a prescription medicine for pain, take it as prescribed. ? If you are not taking a prescription pain medicine, ask your doctor if you can take an over-the-counter medicine. · You may be low in iron because of blood loss. Eat a balanced diet that is high in iron and vitamin C. Foods rich in iron include red meat, shellfish, eggs, beans, and leafy green vegetables. Talk to your doctor about whether you need to take iron pills or a multivitamin. When should you call for help? Call 911 anytime you think you may need emergency care. For example, call if: 
  · You passed out (lost consciousness). Call your doctor now or seek immediate medical care if: 
  · You have new or worse belly or pelvic pain.  
  · You have severe vaginal bleeding.  
  · You feel dizzy or lightheaded, or you feel like you may faint. Watch closely for changes in your health, and be sure to contact your doctor if: 
  · You think you may be pregnant.  
  · Your bleeding gets worse.  
  · You do not get better as expected. Where can you learn more? Go to http://www.gray.com/ Enter T001 in the search box to learn more about \"Abnormal Uterine Bleeding: Care Instructions. \" Current as of: November 8, 2019               Content Version: 12.6 © 1538-9748 [employer]. Care instructions adapted under license by [employer] (which disclaims liability or warranty for this information). If you have questions about a medical condition or this instruction, always ask your healthcare professional. [place] [address] any warranty or liability for your use of this information.

## 2021-01-07 RX ORDER — MEDROXYPROGESTERONE ACETATE 10 MG/1
TABLET ORAL
Qty: 10 TAB | Refills: 0 | Status: SHIPPED | OUTPATIENT
Start: 2021-01-07 | End: 2021-04-07

## 2021-02-16 ENCOUNTER — TELEPHONE (OUTPATIENT)
Dept: OBGYN CLINIC | Age: 31
End: 2021-02-16

## 2021-02-16 NOTE — TELEPHONE ENCOUNTER
Patient's  is calling to say that a medication was supposed to be sent in and the pharmacy never prescribed. The  is not on Hip[employer]. Is the patient supposed to have refills for her Provera or was it a 10 day supply? The  could not tell me if she has had a cycle to start without medication. The patient's  has been advised that I need to further speak with his wife since not hipaa cleared. He got upset and told the interpretor he does not have time to talk. Advised will need to speak with her to find out about what they need.

## 2021-04-07 ENCOUNTER — OFFICE VISIT (OUTPATIENT)
Dept: OBGYN CLINIC | Age: 31
End: 2021-04-07
Payer: MEDICAID

## 2021-04-07 VITALS
SYSTOLIC BLOOD PRESSURE: 114 MMHG | DIASTOLIC BLOOD PRESSURE: 64 MMHG | HEIGHT: 60 IN | BODY MASS INDEX: 35.53 KG/M2 | WEIGHT: 181 LBS

## 2021-04-07 DIAGNOSIS — E28.2 PCOS (POLYCYSTIC OVARIAN SYNDROME): Primary | ICD-10-CM

## 2021-04-07 DIAGNOSIS — R73.03 PRE-DIABETES: ICD-10-CM

## 2021-04-07 DIAGNOSIS — N97.0 INFERTILITY ASSOCIATED WITH ANOVULATION: ICD-10-CM

## 2021-04-07 DIAGNOSIS — N91.5 OLIGOMENORRHEA, UNSPECIFIED TYPE: ICD-10-CM

## 2021-04-07 PROCEDURE — 99213 OFFICE O/P EST LOW 20 MIN: CPT | Performed by: OBSTETRICS & GYNECOLOGY

## 2021-04-07 RX ORDER — MEDROXYPROGESTERONE ACETATE 10 MG/1
10 TABLET ORAL DAILY
Qty: 10 TAB | Refills: 11 | Status: SHIPPED | OUTPATIENT
Start: 2021-04-07 | End: 2021-04-17

## 2021-04-07 NOTE — PROGRESS NOTES
3 month Follow Up     Terrence Orosco is a [address] [place] yo  A1 with suspected PCOS presenting for follow up of AUB (oligomenorrhea) and infertility. Menses have been irregular since SAB several years ago, occurring roughly every 6 months. Pt would like to conceive. Reports she has had weight gain over the past few years, has tried to lose weight without success. No concerns with hirsuitism or acne. She was given provera at last visit to induce menses, had a cycle in January, but was unable to get provera refilled in February or March, so she has not had a cycle since then. Pt has been taking half of a metformin tablet every other day. Reports it gives her stomach ache and makes her feel lightheaded. Prior US showing polycystic appearance to ovaries.      Labs from prior visit:  TSH, PRL, PCOS labs, FSH, LH, E2 all wnl.      Recent A1c 6.0 in pre-diabetic range and pt with elevated triglycerides. BMI 34.     Pt is from [place] but has been in [facility] x 6 years. Margarita  required. TRANSVAGINAL ULTRASOUND PERFORMED 2020. THE UTERUS IS ANTEVERTED,NORMAL IN SIZE AND ECHOGENICITY. ENDOMETRIUM MEASURES 10MM IN THICKNESS. THERE APPEARS TO BE A HYPERECHOIC AREA SEEN  WITHIN THE ENDOMETRIUM MEASURING 15 X 12MM, POSSIBLE POLYP VS. Natalee Crease. CLINICAL  COORELATION RECOMMENDED. THE RIGHT OVARY APPEARS TO HAVE MULTIPLE FOLLICLES SEEN ALONG THE PERIPHERY. THE LEFT OVARY APPEARS TO HAVE MULTIPLE FOLLICLES SEEN ALONG THE PERIPHERY.   NO FREE FLUID SEEN IN THE CDS.     Ob/Gyn Hx:   A1 -  x1, SAB x1  LMP-21  Menses- irregular  Contraception-no, ttc  STI- no  ?SA-yes     Health maintenance:  Pap-20 NILM  Gardasil-denies           Past Medical History:   Diagnosis Date    Anemia                 Past Surgical History:   Procedure Laterality Date    HX APPENDECTOMY                   Family History   Problem Relation Age of Onset    No Known Problems Mother      No Known Problems Father         Social History            Socioeconomic History    Marital status:        Spouse name: Not on file    Number of children: Not on file    Years of education: Not on file    Highest education level: Not on file   Occupational History    Not on file   Social Needs    Financial resource strain: Not on file    Food insecurity       Worry: Not on file       Inability: Not on file    Transportation needs       Medical: Not on file       Non-medical: Not on file   Tobacco Use    Smoking status: Never Smoker    Smokeless tobacco: Never Used   Substance and Sexual Activity    Alcohol use: No       Alcohol/week: 0.0 standard drinks    Drug use: No    Sexual activity: Yes       Partners: Male       Birth control/protection: None   Lifestyle    Physical activity       Days per week: Not on file       Minutes per session: Not on file    Stress: Not on file   Relationships    Social connections       Talks on phone: Not on file       Gets together: Not on file       Attends Caodaism service: Not on file       Active member of club or organization: Not on file       Attends meetings of clubs or organizations: Not on file       Relationship status: Not on file    Intimate partner violence       Fear of current or ex partner: Not on file       Emotionally abused: Not on file       Physically abused: Not on file       Forced sexual activity: Not on file   Other Topics Concern    Not on file   Social History Narrative    Not on file                Current Outpatient Medications   Medication Sig Dispense Refill    ferrous sulfate (IRON) 325 mg (65 mg iron) EC tablet Take 1 Tab by mouth daily. Take with food.  90 Tab 0    acetaminophen (TYLENOL 8 HOUR PO) Take  by mouth.             No Known Allergies     Review of Systems - History obtained from the patient  Constitutional: negative for weight loss, fever, night sweats  HEENT: negative for hearing loss, earache, congestion, snoring, sorethroat  CV: negative for chest pain, palpitations, edema  Resp: negative for cough, shortness of breath, wheezing  GI: negative for change in bowel habits, abdominal pain, black or bloody stools  : negative for frequency, dysuria, hematuria, vaginal discharge, +AUB, pelvic pain, infertility  MSK: negative for back pain, joint pain, muscle pain  Breast: negative for breast lumps, nipple discharge, galactorrhea  Skin :negative for itching, rash, hives  Neuro: negative for dizziness, headache, confusion, weakness  Psych: negative for anxiety, depression, change in mood  Heme/lymph: negative for bleeding, bruising, pallor     Physical Exam  Visit Vitals  /76   Ht 5' (1.524 m)   Wt 178 lb (80.7 kg)   LMP 11/04/2020   BMI 34.76 kg/m²      Constitutional  · Appearance: well-nourished, well developed, alert, in no acute distress     HENT  · Head and Face: appears normal     Chest  · Respiratory Effort: breathing labored  · Auscultation: normal breath sounds     Cardiovascular  · Heart:  · Auscultation: regular rate and rhythm without murmur     Gastrointestinal  · Abdominal Examination: abdomen non-tender to palpation, normal bowel sounds, no masses present  · Liver and spleen: no hepatomegaly present, spleen not palpable  · Hernias: no hernias identified     Skin  · General Inspection: no rash, no lesions identified     Neurologic/Psychiatric  · Mental Status:  · Orientation: grossly oriented to person, place and time  · Mood and Affect: mood normal, affect appropriate        Assessment/Plan:  27 y.o. Stephanie Salinanasima presenting for follow up of AUB with oligomenorrhea and desire to conceive. Likely PCOS given polycystic appearance to ovaries, though no clinical or lab signs of hyperandrogenism. Anovulatory cycles due to obesity also possible. Likely metabolic syndrome given obesity, elevated TGs and preDM.  Prior ?polyp vs. Clot on US in Sept 2020, due for follow up imaging.     -advised increased efforts at weight loss, discussed diet/exercise, informed pt that weight loss can often help menses return spontaneously  -advised ongoing follow up with PCP to manage XOL and preDM   -continue cyclic provera 11OM x 68U per month  -continue metformin 500mg BID (as tolerated) today given likely insulin resistance, and feel that this may help with resumption of normal menses  -repeat A1c today  -advised initiation of PNV, toxin avoidance  -menstrual calendar  -TVUS referral to follow up on lesion in endometrium  -complete fertility workup --> SA, HSG  -if everything normal, consider letrazole for ovulation induction once medical conditions optimized     RTC: 2 wks for US, f/u of A1c, and fertility consult    Beni Brewer MD  4/7/2021  12:07 PM

## 2021-04-08 LAB
EST. AVERAGE GLUCOSE BLD GHB EST-MCNC: 140 MG/DL
HBA1C MFR BLD: 6.5 % (ref 4.8–5.6)

## 2021-04-08 NOTE — PROGRESS NOTES
Please advise pt to follow up with PCP and refer her to endocrinology. Her Hgb A1c is now in the diabetic range. She needs to get her BG under control before we help her conceive.

## 2021-04-09 NOTE — PROGRESS NOTES
Pt contacted and informed of results and recommendations via [employer] .  Pt stated understanding

## 2021-04-27 ENCOUNTER — OFFICE VISIT (OUTPATIENT)
Dept: OBGYN CLINIC | Age: 31
End: 2021-04-27

## 2021-04-27 VITALS
BODY MASS INDEX: 35.14 KG/M2 | SYSTOLIC BLOOD PRESSURE: 120 MMHG | WEIGHT: 179 LBS | HEIGHT: 60 IN | DIASTOLIC BLOOD PRESSURE: 76 MMHG

## 2021-04-27 DIAGNOSIS — E11.8 CONTROLLED DIABETES MELLITUS TYPE 2 WITH COMPLICATIONS, UNSPECIFIED WHETHER LONG TERM INSULIN USE (HCC): ICD-10-CM

## 2021-04-27 DIAGNOSIS — N92.6 IRREGULAR MENSES: ICD-10-CM

## 2021-04-27 DIAGNOSIS — E66.01 SEVERE OBESITY (HCC): ICD-10-CM

## 2021-04-27 DIAGNOSIS — E28.2 PCOS (POLYCYSTIC OVARIAN SYNDROME): ICD-10-CM

## 2021-04-27 DIAGNOSIS — Z31.41 FERTILITY TESTING: Primary | ICD-10-CM

## 2021-04-27 PROCEDURE — 99215 OFFICE O/P EST HI 40 MIN: CPT | Performed by: OBSTETRICS & GYNECOLOGY

## 2021-04-27 RX ORDER — MEDROXYPROGESTERONE ACETATE 10 MG/1
10 TABLET ORAL DAILY
Qty: 1 TAB | Refills: 11 | Status: SHIPPED | OUTPATIENT
Start: 2021-04-27 | End: 2021-05-07

## 2021-04-27 RX ORDER — LANCETS
EACH MISCELLANEOUS
Qty: 100 EACH | Refills: 11 | Status: SHIPPED | OUTPATIENT
Start: 2021-04-27

## 2021-04-27 RX ORDER — INSULIN PUMP SYRINGE, 3 ML
EACH MISCELLANEOUS
Qty: 1 KIT | Refills: 0 | Status: SHIPPED | OUTPATIENT
Start: 2021-04-27

## 2021-04-27 RX ORDER — MEDROXYPROGESTERONE ACETATE 10 MG/1
10 TABLET ORAL DAILY
Qty: 1 TAB | Refills: 11 | Status: SHIPPED | OUTPATIENT
Start: 2021-04-27 | End: 2021-04-27 | Stop reason: SDUPTHER

## 2021-04-27 NOTE — PROGRESS NOTES
Problem Visit     Vangie Mulligan is a 26 yo  A1 with suspected PCOS presenting for ultrasound, follow up of AUB (oligomenorrhea), and infertility. Ultrasound today showing BOV with polycystic appearance and possible endocervical polyp (appearance unchanged c/w prior, not visible on exam). At prior visit we checked A1c which returned at 6.5, in diabetic range, pt has not yet followed up with PCP, endocrine or DTC. She has not been taking metformin consistently because it causes GI distress and makes her feel lightheaded. Menses have been irregular since SAB several years ago, occurring roughly every 6 months. Pt would like to conceive. Reports she has had weight gain over the past few years, has tried to lose weight without success. No concerns with hirsuitism or acne. In the past she was on cyclic provera, but has not been taking this (reports she was told by someone not to take this as her A1c was elevated). No menses since she last took provera in January.      Labs from prior visit:  TSH, PRL, PCOS labs, FSH, LH, E2 all wnl.      Pt with elevated triglycerides. BMI 34. Likely metabolic sydrome.     Pt is from [place] but has been in [facility] x 6 years. Margarita  required. TV ULTRASOUND 21  THE UTERUS IS UNSTABLE LIE, NORMAL IN SIZE AND ECHOGENICITY. THE ENDOMETRIUM MEASURES 14MM IN THICKNESS. THERE APPEARS TO BE A HYPERECHOIC AREA SEEN IN  THE CERVICAL PORTION OF THE ENDO MEASURING 15 X 12 X 14 MM, POSSIBLE POLYP. RIGHT OVARY APPEARS ENLARGED WITH MULTIPLE FOLLICLES SEEN ON THE PERIPHERY. LEFT OVARY APPEARS ENLARGED WITH MULTIPLE FOLLICLES SEEN ON THE PERIPHERY. NO FREE FLUID IS SEEN IN THE CDS. TRANSVAGINAL ULTRASOUND PERFORMED 2020. THE UTERUS IS ANTEVERTED,NORMAL IN SIZE AND ECHOGENICITY. ENDOMETRIUM MEASURES 10MM IN THICKNESS. THERE APPEARS TO BE A HYPERECHOIC AREA SEEN  WITHIN THE ENDOMETRIUM MEASURING 15 X 12MM, POSSIBLE POLYP VS. Starlett Natter. CLINICAL  COORELATION RECOMMENDED. THE RIGHT OVARY APPEARS TO HAVE MULTIPLE FOLLICLES SEEN ALONG THE PERIPHERY. THE LEFT OVARY APPEARS TO HAVE MULTIPLE FOLLICLES SEEN ALONG THE PERIPHERY. NO FREE FLUID SEEN IN THE CDS.     Ob/Gyn Hx:   A1 -  x1 (), SAB x1  LMP- 2021  Menses- irregular  Contraception-no, ttc  STI- no  ?SA-yes     Health maintenance:  Pap-20 NILM  Gardasil-denies           Past Medical History:   Diagnosis Date    Anemia                 Past Surgical History:   Procedure Laterality Date    HX APPENDECTOMY                   Family History   Problem Relation Age of Onset    No Known Problems Mother      No Known Problems Father           Social History            Socioeconomic History    Marital status:        Spouse name: Not on file    Number of children: Not on file    Years of education: Not on file    Highest education level: Not on file   Occupational History    Not on file   Social Needs    Financial resource strain: Not on file    Food insecurity       Worry: Not on file       Inability: Not on file    Transportation needs       Medical: Not on file       Non-medical: Not on file   Tobacco Use    Smoking status: Never Smoker    Smokeless tobacco: Never Used   Substance and Sexual Activity    Alcohol use:  No       Alcohol/week: 0.0 standard drinks    Drug use: No    Sexual activity: Yes       Partners: Male       Birth control/protection: None   Lifestyle    Physical activity       Days per week: Not on file       Minutes per session: Not on file    Stress: Not on file   Relationships    Social connections       Talks on phone: Not on file       Gets together: Not on file       Attends Restorationist service: Not on file       Active member of club or organization: Not on file       Attends meetings of clubs or organizations: Not on file       Relationship status: Not on file    Intimate partner violence       Fear of current or ex partner: Not on file       Emotionally abused: Not on file       Physically abused: Not on file       Forced sexual activity: Not on file   Other Topics Concern    Not on file   Social History Narrative    Not on file                Current Outpatient Medications   Medication Sig Dispense Refill    ferrous sulfate (IRON) 325 mg (65 mg iron) EC tablet Take 1 Tab by mouth daily. Take with food.  90 Tab 0    acetaminophen (TYLENOL 8 HOUR PO) Take  by mouth.             No Known Allergies     Review of Systems - History obtained from the patient  Constitutional: negative for weight loss, fever, night sweats  HEENT: negative for hearing loss, earache, congestion, snoring, sorethroat  CV: negative for chest pain, palpitations, edema  Resp: negative for cough, shortness of breath, wheezing  GI: negative for change in bowel habits, abdominal pain, black or bloody stools  : negative for frequency, dysuria, hematuria, vaginal discharge, +AUB, pelvic pain, infertility  MSK: negative for back pain, joint pain, muscle pain  Breast: negative for breast lumps, nipple discharge, galactorrhea  Skin :negative for itching, rash, hives  Neuro: negative for dizziness, headache, confusion, weakness  Psych: negative for anxiety, depression, change in mood  Heme/lymph: negative for bleeding, bruising, pallor     Physical Exam  Visit Vitals  /76   Ht 5' (1.524 m)   Wt 179 lb (81.2 kg)   BMI 34.96 kg/m²     PHYSICAL EXAMINATION    Constitutional  · Appearance: well-nourished, well developed, alert, in no acute distress    HENT  · Head and Face: appears normal    Chest  · Respiratory Effort: breathing non-labored  · Auscultation: normal breath sounds    Cardiovascular  · Heart:  · Auscultation: regular rate and rhythm without murmur    Gastrointestinal  · Abdominal Examination: abdomen non-tender to palpation, normal bowel sounds, no masses present  · Liver and spleen: no hepatomegaly present, spleen not palpable  · Hernias: no hernias identified    Genitourinary  · External Genitalia: normal appearance for age, no discharge present, no tenderness present, no inflammatory lesions present, no masses present, no atrophy present  · Vagina: normal vaginal vault without central or paravaginal defects, no discharge present, no inflammatory lesions present, no masses present  · Bladder: non-tender to palpation  · Urethra: appears normal  · Cervix: normal, no visible cervical polyp  · Uterus: normal size, shape and consistency  · Adnexa: no adnexal tenderness present, no adnexal masses present  · Perineum: perineum within normal limits, no evidence of trauma, no rashes or skin lesions present  · Anus: anus within normal limits, no hemorrhoids present  · Inguinal Lymph Nodes: no lymphadenopathy present    Skin  · General Inspection: no rash, no lesions identified    Neurologic/Psychiatric  · Mental Status:  · Orientation: grossly oriented to person, place and time  · Mood and Affect: mood normal, affect appropriate        Assessment/Plan:  32 y.o. A1 with AUB/oligomenorrhea and infertility. Likely PCOS given polycystic appearance to ovaries, though no clinical or lab signs of hyperandrogenism. Anovulatory cycles due to obesity also possible. Also with metabolic syndrome given obesity, elevated TGs and DM2. ?1.5cm endocervical polyp seen on US today (appearance unchanged).     -reviewed TVUS findings with patient today, discussed ?polyp stable in appearance and size, will continue to monitor, not visible or apparent on exam  -encourage ongoing efforts at weight loss, discussed diet/exercise, informed pt that weight loss can often help menses return spontaneously  -reviewed A1c of 6.5 today and new diagnosis of DM2  -advised ongoing follow up with PCP to manage XOL and DM  -referral to endocrinology and DTC given new diagnosis of DM and not tolerating metformin  -Rx for glucometer, test strips, lancets today (per pt request)  -continue cyclic provera 16WL x 10d per month for endometrial protection  -cont PNV, toxin avoidance, menstrual calendar  -complete fertility workup --> SA, HSG referrals provided today  -also consider additional labwork ([facility], day 21 progesterone --> though fairly certain she is not ovulating so this may not be necessaryetc)  -if everything normal, consider letrazole for ovulation induction once medical conditions optimized     RTC: 3 months for follow up after SA, HSG and after she has achieved better control of DM    Ousmane Valerio MD  4/27/2021  11:29 AM         Billing: Total time of visit = 50 min  >50% of time was spent in direct face-to-face counseling with patient.

## 2021-04-27 NOTE — PATIENT INSTRUCTIONS
Infertility: Care Instructions Your Care Instructions Infertility means that you haven't been able to get pregnant after trying for at least 1 year. It doesn't mean you'll never get pregnant. A woman's chances of getting pregnant are higher when she's younger. A woman is most able to get pregnant (fertile) in her late 25s. Then, in her mid-30s, she becomes less fertile. This is because her eggs get older. Trouble getting pregnant can be caused by a problem with the reproductive organs of a woman, a man, or both. Talk with your doctor about testing and treatment. If you have tests, you will likely start with a hormone test. This is a test for both of you. And then the man will probably have a semen test. 
There is a wide range of treatment options. They include medicines, surgery, insemination, and in vitro fertilization (IVF). Follow-up care is a key part of your treatment and safety. Be sure to make and go to all appointments, and call your doctor if you are having problems. It's also a good idea to know your test results and keep a list of the medicines you take. How can you care for yourself at home? For women · Take a multivitamin with folic acid. This helps to prevent birth defects if you do become pregnant. · Get regular exercise. But do not overdo it. Really hard and long exercise can cause you to release an egg less often. · Eat healthy foods. And drink lots of water. · Stay at a healthy weight. This will increase your chances of getting pregnant. Women who weigh too much or too little can be less fertile. · Talk to your doctor about all medicines you are taking or may take. This includes over-the-counter and prescribed medicines and herbal remedies. Some medicines interfere with pregnancy. · Write down when your period starts and stops for a few months. Bring that information to your doctor.  Your doctor can help you figure out when you ovulate and are most likely to get pregnant if you have sex. Or you may prefer to use a home ovulation test. 
For men · Avoid hot tubs and saunas. · If you get sick and have a fever, try to control your fever. A high fever may reduce your sperm count for months. · If you exercise very hard most days of the week, reduce how much exercise you do. Hard, long exercise may lower your sperm count. · Eat a healthy diet and stay at a healthy weight. Limit alcohol to 2 drinks a day. For both men and women · If the woman knows when she will ovulate, try to have sex once a day for the 4 days before ovulation and on the day of ovulation. If the man has a low sperm count, have sex every other day. · If the woman does not know when she will ovulate, have sex 2 or 3 times each week. · Don't use lubricants during sex. They may affect how well sperm can travel to meet the woman's egg. · Avoid smoking, marijuana, and illegal drugs. When should you call for help? Watch closely for changes in your health, and be sure to contact your doctor if you have any problems. Where can you learn more? Go to http://www.gray.com/ Enter 993 [id number] in the search box to learn more about \"Infertility: Care Instructions. \" Current as of: October 8, 2020               Content Version: 12.8 © 2006-2021 [employer], [employer]. Care instructions adapted under license by [employer] (which disclaims liability or warranty for this information). If you have questions about a medical condition or this instruction, always ask your healthcare professional. [place] [address] any warranty or liability for your use of this information.

## 2021-05-04 ENCOUNTER — OFFICE VISIT (OUTPATIENT)
Dept: FAMILY MEDICINE CLINIC | Age: 31
End: 2021-05-04
Payer: MEDICAID

## 2021-05-04 ENCOUNTER — TELEPHONE (OUTPATIENT)
Dept: FAMILY MEDICINE CLINIC | Age: 31
End: 2021-05-04

## 2021-05-04 VITALS
OXYGEN SATURATION: 98 % | SYSTOLIC BLOOD PRESSURE: 106 MMHG | HEART RATE: 67 BPM | WEIGHT: 177.6 LBS | RESPIRATION RATE: 16 BRPM | DIASTOLIC BLOOD PRESSURE: 69 MMHG | BODY MASS INDEX: 33.53 KG/M2 | TEMPERATURE: 97.9 F | HEIGHT: 61 IN

## 2021-05-04 DIAGNOSIS — E11.9 DIABETES MELLITUS TYPE 2, DIET-CONTROLLED (HCC): ICD-10-CM

## 2021-05-04 DIAGNOSIS — Z76.89 ENCOUNTER TO ESTABLISH CARE: Primary | ICD-10-CM

## 2021-05-04 DIAGNOSIS — M54.12 CERVICAL RADICULOPATHY: ICD-10-CM

## 2021-05-04 LAB — GLUCOSE POC: 96 MG/DL

## 2021-05-04 PROCEDURE — 82962 GLUCOSE BLOOD TEST: CPT | Performed by: NURSE PRACTITIONER

## 2021-05-04 PROCEDURE — 99203 OFFICE O/P NEW LOW 30 MIN: CPT | Performed by: NURSE PRACTITIONER

## 2021-05-04 RX ORDER — METFORMIN HYDROCHLORIDE 500 MG/5ML
500 SOLUTION ORAL 2 TIMES DAILY
Qty: 300 ML | Refills: 3 | Status: CANCELLED | OUTPATIENT
Start: 2021-05-04

## 2021-05-04 RX ORDER — METFORMIN HYDROCHLORIDE 500 MG/5ML
500 SOLUTION ORAL 2 TIMES DAILY
Qty: 300 ML | Refills: 3 | Status: SHIPPED | OUTPATIENT
Start: 2021-05-04 | End: 2022-04-11 | Stop reason: ALTCHOICE

## 2021-05-04 NOTE — PROGRESS NOTES
Chief Complaint   Patient presents with   [facility].     Previous PCP- Dr. Guanako Gil High Blood Sugar    Arm Pain     pain and numbness making it hard to sleep. numbness has been more recent     Leg Pain     pain and numbness making it hard to sleep. numbness has been more recent      1. Have you been to the ER, urgent care clinic since your last visit? Hospitalized since your last visit? No     2. Have you seen or consulted any other health care providers outside of the [address] [place] [address] since your last visit? Include any pap smears or colon screening.  No

## 2021-05-04 NOTE — TELEPHONE ENCOUNTER
NP Loyda,             The Metformin 500mg/5 ml Soln is not covered by pt's ins plan. Pt has a hx of Metformin 500 mg tabs. Please review and obtain a Prior Authorization or alternate therapy. Requested Prescriptions     Pending Prescriptions Disp Refills    metFORMIN 500 mg/5 mL soln 300 mL 3     Sig: Take 500 mg by mouth two (2) times a day.

## 2021-05-06 NOTE — TELEPHONE ENCOUNTER
Chief Complaint   Patient presents with    Prior Auth     metFORMIN HCl 500MG/5ML solution:  PA Case: [id number], Status: Approved, Coverage Starts on: 5/6/2021 12:00:00 AM, Coverage Ends on: 5/6/2022 12:00:00 AM.     [facility] pharmacy was faxed approval notification at [phone] with confirmation received.   Fabrice Burr LPN

## 2021-05-06 NOTE — PROGRESS NOTES
Pharmacy Progress Note - Diabetes Management    S/O: Ms. Dina Sigala is a 32 y.o. female, referred by Dr. Kaity Lange, NP, with a PMH of T2DM, PCOS, GERD, constipation, anemia, vitamin D deficiency, obesity, was seen today for diabetes management. Patient's last A1c was 6.5% (April 2021). Interim update: Last saw PCP 4/27/21 where pt reporting experiencing nausea/diarrhea with Metformin which has led to nonadherence. Received glucometer, test strips, and lancets (per pt request). Pt has been attempting weight loss with minimal success. Undergoing PCOS/infertility workup with PCP. Pt arrives to clinic for DM education and management. Require Margarita  -  ID #[id number] utilized during visit. Discussed diagnosis, sx hyperglycemia, progression, goals, treatment. Pt states her BG goal is <110 mg/dL. She received a glucometer and has been checking her BG at home. States she experienced dizziness and shaking after taking a whole Metformin tab. Denies nausea or diarrhea. Waiting for Metformin liquid PA to be approved. In the meantime, she is has been taking 1/2 Metformin tablet and tolerating it well. SHx:  - Pt from [place] but has been in [address] [place] [address],[address] x6 yrs.     Diabetes Management:  Diabetes History: 1 mo ago when lab work came back  - Family hx: mother has DM without any known complications  - Previous anti-hyperglycemic agents includes: Metformin IR tablets    Current anti-hyperglycemic regimen includes:    - Metformin 500 mg/5 mL solution 500 mg BID - PA has been sent, pt has not started taking - instead she is taking 1/2 tablet of Metformin 500 mg every day     ROS:  Today, Pt endorses:  - Symptoms of Hyperglycemia: none  - Symptoms of Hypoglycemia: none    Self Monitoring Blood Glucose (SMBG) or CGM:  - Brought in home glucometer/blood glucose log/CGM reader today:  no  - Conducts/scans: every day - before sunset (fasting)   - Fasting SMBG today: 98 mg/dL  - Post-prandial: 165 yesterday   - SMBG range: unknown - could not remember    Nutrition:  - Currently observing Ramadan. Eats 2 meals/day after sunset and before sunrise  - Breakfast: egg with toast  - Dinner:  Mostly veg - okra stew, spinach, a small portion of rice (maybe once a month)  - Beverage(s): water and tea - no added sweetener  - Alcohol consumption? No     Physical Activity:   yes  - Consists of walking 30 minutes QAM    Diabetes Health Maintenance:  · ASCVD Risk Factors: Diabetes and Lack of statin therapy  · ASA therapy:  none   · ACE/ARB therapy: none  · Optimized statin therapy: none  · ASCVD Risk Score: unable to calculate; LDL: 47 mg/dL (9/11/20)  · Nicotine dependence: No  · Last eye exam: will discuss at future visit  · Last foot exam: will discuss at future visit  · Last influenza vaccine: 11/8/18  · Last Pneumovax 23 vaccine: will discuss at future visit  · Last Prevnar-13 vaccine: NA  · Hepatitis B Series: will discuss at future visit  · COVID-19 vaccine: 4/14/21, 5/5/21      Vitals: Wt Readings from Last 3 Encounters:   05/04/21 177 lb 9.6 oz (80.6 kg)   04/27/21 179 lb (81.2 kg)   04/07/21 181 lb (82.1 kg)     BP Readings from Last 3 Encounters:   05/04/21 106/69   04/27/21 120/76   04/07/21 114/64     Pulse Readings from Last 3 Encounters:   05/04/21 67   09/11/20 82   08/11/20 81       Past Medical History:   Diagnosis Date    Anemia      No Known Allergies    Current Outpatient Medications   Medication Sig    metFORMIN 500 mg/5 mL soln Take 500 mg by mouth two (2) times a day.  medroxyPROGESTERone (PROVERA) 10 mg tablet Take 1 Tab by mouth daily for 10 days. Take 1 pill daily for first 10 days of each month to induce menses.     Blood-Glucose Meter monitoring kit Check blood glucose 4x daily ac TID and qhs    glucose blood VI test strips (ASCENSIA AUTODISC VI, ONE TOUCH ULTRA TEST VI) strip Check blood glucose 4x daily ac TID and qhs    lancets misc Check blood glucose 4x daily ac TID and qhs    OMEPRAZOLE PO Take  by mouth.  ferrous sulfate (IRON) 325 mg (65 mg iron) EC tablet Take 1 Tab by mouth daily. Take with food.  acetaminophen (TYLENOL 8 HOUR PO) Take  by mouth. No current facility-administered medications for this visit. Lab Results   Component Value Date/Time    Sodium 136 09/11/2020 12:00 AM    Potassium 4.4 09/11/2020 12:00 AM    Chloride 104 09/11/2020 12:00 AM    CO2 20 09/11/2020 12:00 AM    Anion gap 7 02/26/2020 09:55 PM    Glucose 142 (H) 09/11/2020 12:00 AM    BUN 8 09/11/2020 12:00 AM    Creatinine 0.50 (L) 09/11/2020 12:00 AM    BUN/Creatinine ratio 16 09/11/2020 12:00 AM    GFR est  09/11/2020 12:00 AM    GFR est non- 09/11/2020 12:00 AM    Calcium 9.6 09/11/2020 12:00 AM    Bilirubin, total 0.6 09/11/2020 12:00 AM    Alk.  phosphatase 123 (H) 09/11/2020 12:00 AM    Protein, total 7.4 09/11/2020 12:00 AM    Albumin 4.4 09/11/2020 12:00 AM    Globulin 4.0 02/26/2020 09:55 PM    A-G Ratio 1.5 09/11/2020 12:00 AM    ALT (SGPT) 12 09/11/2020 12:00 AM     Lab Results   Component Value Date/Time    Cholesterol, total 132 09/11/2020 12:00 AM    HDL Cholesterol 25 (L) 09/11/2020 12:00 AM    LDL, calculated 47 09/11/2020 12:00 AM    VLDL, calculated 60 (H) 09/11/2020 12:00 AM    Triglyceride 398 (H) 09/11/2020 12:00 AM     Lab Results   Component Value Date/Time    WBC 7.1 09/11/2020 12:00 AM    HGB 10.4 (L) 09/11/2020 12:00 AM    HCT 33.7 (L) 09/11/2020 12:00 AM    PLATELET 605 53/99/5528 12:00 AM    MCV 75 (L) 09/11/2020 12:00 AM       No results found for: MCACR, MCA1, MCA2, MCA3, MCAU, MCAU2, MCALPOCT    Lab Results   Component Value Date/Time    Hemoglobin A1c 6.5 (H) 04/07/2021 12:00 AM    Hemoglobin A1c 6.0 (H) 09/11/2020 12:00 AM    Hemoglobin A1c 5.9 (H) 10/08/2015 11:13 AM     Hemoglobin A1c (POC)   Date Value Ref Range Status   09/07/2017 5.4 % Final        CrCl cannot be calculated (Patient's most recent lab result is older than the maximum 180 days allowed. ). A/P:    1) T2DM: Per ADA guidelines, Pt's A1c is not at goal of < 6.5%. Current SMBG(s)/CGM trend is elevated. Fasting BG goal  mg/dL. Post prandial BG goal <140 mg/dL. Only taking small dose of Metformin. Also adhering to low carb diet and exercise. Will continue to work on increasing Metformin dose to target dose. - INCREASE Metformin to 250 mg BID  - Encouraged low carb diet  - Encouraged 150 min/wk of exercise  - Encouraged 8-10 lbs weight loss (approx 5% of body weight)    2) HTN: BP is at goal of < 130/80. Currently taking no meds. No changes needed. 3) Primary Prevention ASCVD: Per ADA guidelines, pts age 43-69 yrs are recommended for statin therapy. Pt is <40 yrs old. LDL was <100 mg/dL (Sept 2020). Updated lipid panel is needed. - Lipid panel has been ordered to be completed by PCP    4) Immunizations: PPSV23 due  - Will discuss at next visit    Established patient-centered goal(s) to follow up on at the next visit:    -     Check: Vitals, Weight, Medication Adherence and Lipids at the next visit. Medication reconciliation was completed during the visit. Medications Discontinued During This Encounter   Medication Reason    ferrous sulfate (IRON) 325 mg (65 mg iron) EC tablet LIST CLEANUP     No orders of the defined types were placed in this encounter. Patient verbalized understanding of the information presented and all of the patients questions were answered. AVS was handed to the patient. Patient advised to call the office with any additional questions or concerns. Notifications of recommendations will be sent to Dr. Dorys Jensen, JOESPH for review. Patient will return to clinic in 4 week(s) for follow up. Miriam Torre, PharmD, BCGP  Clinical Pharmacist Specialist          For Pharmacy Admin Tracking Only     CPA in place:  Yes   Recommendation Provided To: Patient/Caregiver: 3 via In person   Intervention Detail: Discontinued Rx: 1, reason: Non-adherence and Patient Preference, Dose Adjustment: 1: reason: Therapy Optimization and Scheduled Appointment   Gap Closed?: No   Total # of Interventions Recommended: 3   Total # of Interventions Accepted: 3   Intervention Accepted By: Patient/Caregiver: 3   Time Spent (min): 75

## 2021-05-07 ENCOUNTER — OFFICE VISIT (OUTPATIENT)
Dept: FAMILY MEDICINE CLINIC | Age: 31
End: 2021-05-07

## 2021-05-07 VITALS
BODY MASS INDEX: 33.17 KG/M2 | DIASTOLIC BLOOD PRESSURE: 49 MMHG | SYSTOLIC BLOOD PRESSURE: 105 MMHG | WEIGHT: 177 LBS | HEART RATE: 75 BPM

## 2021-05-07 DIAGNOSIS — E11.9 DIABETES MELLITUS TYPE 2, DIET-CONTROLLED (HCC): Primary | ICD-10-CM

## 2021-05-07 NOTE — PATIENT INSTRUCTIONS
Your A1c was 6.5% which was elevated. Goal A1c less than 6.5%. Start taking Metformin 1/2 tablet twice a day to improve blood sugars. Blood Sugar Goal 
Fastin-100 mg/dL 
1-2 after meals: Less than 140 mg/dL Carbohydrate Goals Meal: 30-45 grams Snacks: 15 grams Personal Goals: - Exercise 150 minutes per week. - 5% weight loss = 8-10 lbs

## 2021-05-10 NOTE — PROGRESS NOTES
HPI:   Kiara Garcia is a 32 y.o. female who presents to [facility] care. History of type two diabetes which was recently found on evaluation for infertility. She has one daughter. She is attempting to become pregnant. She has started Metformin recently. She is having difficulty swallowing the pills. She is tolerating well. She is requesting blood sugar evaluation today. Reports a history of numbness and tingling in the upper extremities. Symptoms are longstanding, worsening over time. This is her first evaluation. Has attempted otc treatment without improvement. Current Outpatient Medications   Medication Sig Dispense Refill    metFORMIN 500 mg/5 mL soln Take 500 mg by mouth two (2) times a day. 300 mL 3    Blood-Glucose Meter monitoring kit Check blood glucose 4x daily ac TID and qhs 1 Kit 0    glucose blood VI test strips (ASCENSIA AUTODISC VI, ONE TOUCH ULTRA TEST VI) strip Check blood glucose 4x daily ac TID and qhs 100 Strip 5    lancets misc Check blood glucose 4x daily ac TID and qhs 100 Each 11    OMEPRAZOLE PO Take  by mouth.  acetaminophen (TYLENOL 8 HOUR PO) Take  by mouth.         No Known Allergies   Patient Active Problem List   Diagnosis Code    Chronic tension-type headache, not intractable G44.229    Chronic frontal sinusitis J32.1    GERD (gastroesophageal reflux disease) K21.9    Abdominal pain R10.9    Microcytic hypochromic anemia D50.9    Idiopathic urticaria L50.1    Bilateral flank pain R10.9    Female pelvic pain R10.2    Missed period N92.6    Crystalluria R82.998    Acute recurrent maxillary sinusitis J01.01    Body aches R52    H/O miscarriage, not currently pregnant Z87.59    Vaginal bleeding, abnormal N93.9    DUB (dysfunctional uterine bleeding) N93.8    Strain of left trapezius muscle S46.812A    Slow transit constipation K59.01    Left cervical radiculopathy M54.12    Allergic sinusitis J30.9    Secondary amenorrhea N91.1    Vitamin D deficiency E55.9    Severe obesity ([facility] [address] E66.01     Past Medical History:   Diagnosis Date    Anemia       Past Surgical History:   Procedure Laterality Date    HX APPENDECTOMY        Patient's last menstrual period was 04/28/2021 (exact date). Family History   Problem Relation Age of Onset    No Known Problems Mother     No Known Problems Father       Social History     Socioeconomic History    Marital status:      Spouse name: Not on file    Number of children: Not on file    Years of education: Not on file    Highest education level: Not on file   Occupational History    Not on file   Social Needs    Financial resource strain: Not on file    Food insecurity     Worry: Not on file     Inability: Not on file    Transportation needs     Medical: Not on file     Non-medical: Not on file   Tobacco Use    Smoking status: Never Smoker    Smokeless tobacco: Never Used   Substance and Sexual Activity    Alcohol use: No     Alcohol/week: 0.0 standard drinks    Drug use: No    Sexual activity: Yes     Partners: Male     Birth control/protection: None   Lifestyle    Physical activity     Days per week: Not on file     Minutes per session: Not on file    Stress: Not on file   Relationships    Social connections     Talks on phone: Not on file     Gets together: Not on file     Attends Zoroastrian service: Not on file     Active member of club or organization: Not on file     Attends meetings of clubs or organizations: Not on file     Relationship status: Not on file    Intimate partner violence     Fear of current or ex partner: Not on file     Emotionally abused: Not on file     Physically abused: Not on file     Forced sexual activity: Not on file   Other Topics Concern    Not on file   Social History Narrative    Not on file        ROS:   Review of Systems   Constitutional: Negative for fever, malaise/fatigue and weight loss. HENT: Negative for hearing loss.     Eyes: Negative for blurred vision and pain. Respiratory: Negative for cough and shortness of breath. Cardiovascular: Negative for chest pain, palpitations and leg swelling. Gastrointestinal: Negative for abdominal pain, blood in stool, constipation, diarrhea and melena. Genitourinary: Negative for dysuria and hematuria. Musculoskeletal: Negative for joint pain. Skin: Negative for rash. Neurological: Positive for sensory change. Negative for headaches. Psychiatric/Behavioral: Negative for depression. The patient is not nervous/anxious and does not have insomnia. Physical Exam:     Visit Vitals  /69 (BP 1 Location: Left upper arm, BP Patient Position: Sitting, BP Cuff Size: Adult)   Pulse 67   Temp 97.9 °F (36.6 °C) (Temporal)   Resp 16   Ht 5' 1.25\" (1.556 m)   Wt 177 lb 9.6 oz (80.6 kg)   LMP 04/28/2021 (Exact Date)   SpO2 98%   BMI 33.28 kg/m²        Vitals and Nurse Documentation reviewed. Physical Exam  Constitutional:       General: She is not in acute distress. Appearance: She is obese. HENT:      Right Ear: No drainage. No middle ear effusion. Tympanic membrane is not injected, erythematous or bulging. Left Ear: No drainage. No middle ear effusion. Tympanic membrane is not injected, erythematous or bulging. Eyes:      Pupils: Pupils are equal, round, and reactive to light. Neck:      Trachea: No tracheal deviation. Cardiovascular:      Pulses:           Dorsalis pedis pulses are 2+ on the right side and 2+ on the left side. Posterior tibial pulses are 2+ on the right side and 2+ on the left side. Heart sounds: S1 normal and S2 normal. No murmur. No friction rub. No gallop. Pulmonary:      Breath sounds: Normal breath sounds. No wheezing. Abdominal:      General: Bowel sounds are normal. There is no distension. Palpations: Abdomen is soft. There is no mass. Tenderness: There is no abdominal tenderness. Lymphadenopathy:      Cervical: No cervical adenopathy. Skin:     General: Skin is warm and dry. Neurological:      Cranial Nerves: No cranial nerve deficit. Sensory: Sensation is intact. Motor: Motor function is intact. Assessment/ Plan:   [employer] were discussed including hours of operation, on-call providers, and MyChart. Diagnoses and all orders for this visit:    1. Encounter to establish care    2. Diabetes mellitus type 2, diet-controlled (HCC)  -     metFORMIN 500 mg/5 mL soln; Take 500 mg by mouth two (2) times a day. -     AMB POC GLUCOSE BLOOD, BY GLUCOSE MONITORING DEVICE  Stable on current therapy however we will switch her to liquid form Metformin. Too early to check A1c and this is likely going to be managed by her fertility specialist.    3. Cervical radiculopathy  -     XR SPINE CERV 4 OR 5 V; Future  X-ray pending. Consider physical therapy. If no improvement will likely refer for EMG.       Discussed expected course/resolution/complications of diagnosis in detail with patient.    Medication risks/benefits/costs/interactions/alternatives discussed with patient.    Pt was given an after visit summary which includes diagnoses, current medications & vitals.    Pt expressed understanding with the diagnosis and plan

## 2021-06-07 ENCOUNTER — OFFICE VISIT (OUTPATIENT)
Dept: FAMILY MEDICINE CLINIC | Age: 31
End: 2021-06-07
Payer: MEDICAID

## 2021-06-07 VITALS — WEIGHT: 177.2 LBS | BODY MASS INDEX: 33.21 KG/M2

## 2021-06-07 DIAGNOSIS — E11.9 DIABETES MELLITUS TYPE 2, DIET-CONTROLLED (HCC): Primary | ICD-10-CM

## 2021-06-07 LAB — GLUCOSE POC: 167 MG/DL

## 2021-06-07 PROCEDURE — 90732 PPSV23 VACC 2 YRS+ SUBQ/IM: CPT | Performed by: PHARMACIST

## 2021-06-07 PROCEDURE — 90471 IMMUNIZATION ADMIN: CPT | Performed by: PHARMACIST

## 2021-06-07 PROCEDURE — 82962 GLUCOSE BLOOD TEST: CPT | Performed by: PHARMACIST

## 2021-06-07 NOTE — PROGRESS NOTES
Pharmacy Progress Note - Diabetes Management    S/O: Ms. Doug Mullins is a 32 y.o. female, referred by Dr. Lona Martinez NP, with a PMH of T2DM, PCOS, GERD, was seen today for diabetes management. Patient's last A1c was 6.5% (April 2021). Interim update: Pt was seen at the clinic using telephonic Margarita  #[id number]    Pt has been taking liquid Metformin 5 mL (500 mg) once daily. Denies nausea/diarrhea with dose. SHx:  - Pt from [place] but has been in [address] [place] [address],[address] x6 yrs. Current anti-hyperglycemic regimen includes:    - Metformin 500 mg/5 mL - takes 5 mL every day     ROS:  Today, Pt endorses:  - Symptoms of Hyperglycemia: none  - Symptoms of Hypoglycemia: none    Self Monitoring Blood Glucose (SMBG) or CGM:  - Brought in home glucometer/blood glucose log/CGM reader today:  no  - Checks BG - every day - fasting or after meal  - Fasting SMBG today: 108 mg/dL   - Post-prandial: 120s  - BG in office (post prandial): 167 mg/dL     Nutrition:  - Continues to eat very little carbs - mostly veg and protein    Physical Activity:   yes  - Walking less d/t pain in knees    Diabetes Health Maintenance:  · ASCVD Risk Factors: Diabetes and Lack of statin therapy  · ASA therapy:  none  · ACE/ARB therapy: none  · Optimized statin therapy: none  · ASCVD Risk Score: unable to calculate; LDL: 47 (9/11/20)  · Nicotine dependence: No  · Last eye exam: none  · Last foot exam: none  · Last influenza vaccine: 11/8/18  · Last Pneumovax 23 vaccine: 6/7/21  · Last Prevnar-13 vaccine: none  · Hepatitis B Series: none   · COVID-19 vaccine: 4/14/21, 5/5/21      Vitals:   Wt Readings from Last 3 Encounters:   05/07/21 177 lb (80.3 kg)   05/04/21 177 lb 9.6 oz (80.6 kg)   04/27/21 179 lb (81.2 kg)     BP Readings from Last 3 Encounters:   05/07/21 (!) 105/49   05/04/21 106/69   04/27/21 120/76     Pulse Readings from Last 3 Encounters:   05/07/21 75   05/04/21 67   09/11/20 82       Past Medical History:   Diagnosis Date  Anemia      No Known Allergies    Current Outpatient Medications   Medication Sig    metFORMIN 500 mg/5 mL soln Take 500 mg by mouth two (2) times a day.  Blood-Glucose Meter monitoring kit Check blood glucose 4x daily ac TID and qhs    glucose blood VI test strips (ASCENSIA AUTODISC VI, ONE TOUCH ULTRA TEST VI) strip Check blood glucose 4x daily ac TID and qhs    lancets misc Check blood glucose 4x daily ac TID and qhs    OMEPRAZOLE PO Take  by mouth.  acetaminophen (TYLENOL 8 HOUR PO) Take  by mouth. No current facility-administered medications for this visit. Lab Results   Component Value Date/Time    Sodium 136 09/11/2020 12:00 AM    Potassium 4.4 09/11/2020 12:00 AM    Chloride 104 09/11/2020 12:00 AM    CO2 20 09/11/2020 12:00 AM    Anion gap 7 02/26/2020 09:55 PM    Glucose 142 (H) 09/11/2020 12:00 AM    BUN 8 09/11/2020 12:00 AM    Creatinine 0.50 (L) 09/11/2020 12:00 AM    BUN/Creatinine ratio 16 09/11/2020 12:00 AM    GFR est  09/11/2020 12:00 AM    GFR est non- 09/11/2020 12:00 AM    Calcium 9.6 09/11/2020 12:00 AM    Bilirubin, total 0.6 09/11/2020 12:00 AM    Alk.  phosphatase 123 (H) 09/11/2020 12:00 AM    Protein, total 7.4 09/11/2020 12:00 AM    Albumin 4.4 09/11/2020 12:00 AM    Globulin 4.0 02/26/2020 09:55 PM    A-G Ratio 1.5 09/11/2020 12:00 AM    ALT (SGPT) 12 09/11/2020 12:00 AM     Lab Results   Component Value Date/Time    Cholesterol, total 132 09/11/2020 12:00 AM    HDL Cholesterol 25 (L) 09/11/2020 12:00 AM    LDL, calculated 47 09/11/2020 12:00 AM    VLDL, calculated 60 (H) 09/11/2020 12:00 AM    Triglyceride 398 (H) 09/11/2020 12:00 AM     Lab Results   Component Value Date/Time    WBC 7.1 09/11/2020 12:00 AM    HGB 10.4 (L) 09/11/2020 12:00 AM    HCT 33.7 (L) 09/11/2020 12:00 AM    PLATELET 146 63/44/2221 12:00 AM    MCV 75 (L) 09/11/2020 12:00 AM       No results found for: MCACR, MCA1, MCA2, MCA3, MCAU, MCAU2, MCALPOCT    Lab Results   Component Value Date/Time    Hemoglobin A1c 6.5 (H) 2021 12:00 AM    Hemoglobin A1c 6.0 (H) 2020 12:00 AM    Hemoglobin A1c 5.9 (H) 10/08/2015 11:13 AM     Hemoglobin A1c (POC)   Date Value Ref Range Status   2017 5.4 % Final        CrCl cannot be calculated (Patient's most recent lab result is older than the maximum 180 days allowed. ). A/P:    1) T2DM: Per ADA guidelines, Pt's A1c is not at goal of < 6.5%. Current SMBG(s)/CGM trend improved. Pt did not bring BG log, but the few rdgs she did recall were close to goal.  In office BG rdg was post prandial and above personal goal of <140 mg/dL. Pt is not at max clinically effective dose of Metformin. Tolerates agent and GFR WNL. Will increase dose. - INCREASE Metformin to 5 mL (500 mg) BID  - Encouraged scheduling appt with eye doctor  - Encouraged daily foot checks    2) HTN: BP is at goal of < 130/80. Currently taking no meds. No changes needed. -     3) Primary Prevention ASCVD: Per ADA guidelines, pts age 43-69 yrs are recommended for statin therapy. Pt is <40 yrs old. LDL was <100 mg/dL (2020). Updated lipid panel is needed. - Lipid panel has been ordered to be completed by PCP    4) Immunizations: Pt due for PPSV23  - Immunization provided during visit. Established patient-centered goal(s) to follow up on at the next visit:    - Exercise 150 minutes per week. - 5% weight loss = 8-10 lbs    Check: Vitals, Weight and Medication Adherence at the next visit. Medication reconciliation was completed during the visit.     Medications Discontinued During This Encounter   Medication Reason    pneumococcal 23-valent (PNEUMOVAX 23) 25 mcg/0.5 mL injection Therapy Completed     Orders Placed This Encounter    PNEUMOCOCCAL POLYSACCHARIDE VACCINE, 23-VALENT, ADULT OR IMMUNOSUPPRESSED PT DOSE,    AMB POC GLUCOSE BLOOD, BY GLUCOSE MONITORING DEVICE    DISCONTD: pneumococcal 23-valent (PNEUMOVAX 23) 25 mcg/0.5 mL injection     Si.5 mL by IntraMUSCular route once for 1 dose. Dispense:  0.5 mL     Refill:  0       Patient verbalized understanding of the information presented and all of the patients questions were answered. AVS was handed to the patient. Patient advised to call the office with any additional questions or concerns. Notifications of recommendations will be sent to Dr. Fazal Hearn NP for review. Patient will return to clinic in 4 week(s) for follow up. Flor Chakraborty, PharmD, [facility]  Clinical Pharmacist Specialist          For Pharmacy Admin Tracking Only     CPA in place:  Yes   Recommendation Provided To: Patient/Caregiver: 4 via In person   Intervention Detail: Dose Adjustment: 1, reason: Therapy Optimization, Lab(s) Ordered, Scheduled Appointment and Vaccine Recommended/Administered   Gap Closed?: No   Total # of Interventions Recommended: 4   Total # of Interventions Accepted: 4   Intervention Accepted By: Patient/Caregiver: 4   Time Spent (min): 60

## 2021-06-07 NOTE — PATIENT INSTRUCTIONS
Your A1c was 6.5% which was elevated. Goal A1c less than 6.5%. INCREASE Metformin to 5 mL twice a day with breakfast and dinner. Check your blood sugars at home - fasting (before breakfast) and after meals a few times a week. Write the numbers down and bring to our next visit. You can  Calcium 600 mg-Vitamin D 400 international units over the counter at the pharmacy. Take 1 tablet twice a day. Make an appointment with an eye doctor to look at your eyes. Blood Sugar Goal 
Fastin-100 mg/dL 
1-2 after meals: Less than 140 mg/dL Carbohydrate Goals Meal: 30-45 grams Snacks: 15 grams Personal Goals: - Exercise 150 minutes per week. - 5% weight loss = 8-10 lbs

## 2021-07-06 ENCOUNTER — OFFICE VISIT (OUTPATIENT)
Dept: FAMILY MEDICINE CLINIC | Age: 31
End: 2021-07-06

## 2021-07-06 VITALS — BODY MASS INDEX: 33.47 KG/M2 | WEIGHT: 178.6 LBS

## 2021-07-06 DIAGNOSIS — E11.9 DIABETES MELLITUS TYPE 2, DIET-CONTROLLED (HCC): Primary | ICD-10-CM

## 2021-07-06 NOTE — PATIENT INSTRUCTIONS
Your A1c was 6.5% which was elevated.  Goal A1c less than 6.5%. Increase fiber in diet or take over the counter fiber supplement. Blood Sugar Goal  Fastin-100 mg/dL  1-2 after meals: Less than 140 mg/dL    Carbohydrate Goals  Meal: 30-45 grams   Snacks: 15 grams    Personal Goals:  - Exercise 150 minutes per week.   - 5% weight loss = 8-10 lbs

## 2021-07-06 NOTE — PROGRESS NOTES
Pharmacy Progress Note - Diabetes Management    S/O: Ms. Pedro Bowser is a 32 y.o. female, referred by Dr. Lucila Magallanes NP, with a PMH of T2DM, PCOS, GERD, was seen today for diabetes management. Patient's last A1c was 6.5% (April 2021).          Interim update: Pt was seen at the clinic using telephonic Margarita  #[id number]    Increased Metformin to BID. Endorsed nausea and diarrhea with increased dose. However, it has improved while taking. Nausea has resolved. Had 3 loose stools per day and has decreased to 1 loose, watery stool per day. SHx:  - Pt from [place] but has been in [facility] x6 yrs. Current anti-hyperglycemic regimen includes:    - Metformin to 5 mL (500 mg) BID    ROS:  Today, Pt endorses:  - Symptoms of Hyperglycemia: none  - Symptoms of Hypoglycemia: shakiness - once with fasting BG 91 - sx resolved with juice    Self Monitoring Blood Glucose (SMBG) or CGM:  - Brought in home glucometer/blood glucose log/CGM reader today:  yes  - Checks BG: BID - fasting and 1-2 hours after a meal  - Fasting - avg 107 ()  - Post prandial - avg 150 (104-189)    Diabetes Health Maintenance:  · ASCVD Risk Factors: Diabetes and Lack of statin therapy  · ASA therapy:  none   · ACE/ARB therapy: none  · Optimized statin therapy: none  · The ASCVD Risk score (Zack Fajardo, et al., 2013) failed to calculate for the following reasons:  ·   The 2013 ASCVD risk score is only valid for ages 36 to 78    · LDL: 47 mg/dL (9/11/20)  · Nicotine dependence: No  · Last eye exam: none  · Last foot exam: none  · Last influenza vaccine: 11/8/18  · Last Pneumovax 23 vaccine: 6/7/21  · Last Prevnar-13 vaccine: none  · Hepatitis B Series: none   · COVID-19 vaccine: 4/14/21, 5/5/21      Vitals:   Wt Readings from Last 3 Encounters:   06/07/21 177 lb 3.2 oz (80.4 kg)   05/07/21 177 lb (80.3 kg)   05/04/21 177 lb 9.6 oz (80.6 kg)     BP Readings from Last 3 Encounters:   05/07/21 (!) 105/49   05/04/21 106/69   04/27/21 120/76     Pulse Readings from Last 3 Encounters:   05/07/21 75   05/04/21 67   09/11/20 82       Past Medical History:   Diagnosis Date    Anemia      No Known Allergies    Current Outpatient Medications   Medication Sig    metFORMIN 500 mg/5 mL soln Take 500 mg by mouth two (2) times a day. (Patient taking differently: Take 500 mg by mouth two (2) times a day. Once daily)    Blood-Glucose Meter monitoring kit Check blood glucose 4x daily ac TID and qhs    glucose blood VI test strips (ASCENSIA AUTODISC VI, ONE TOUCH ULTRA TEST VI) strip Check blood glucose 4x daily ac TID and qhs    lancets misc Check blood glucose 4x daily ac TID and qhs    OMEPRAZOLE PO Take  by mouth daily.  acetaminophen (TYLENOL 8 HOUR PO) Take  by mouth. No current facility-administered medications for this visit. Lab Results   Component Value Date/Time    Sodium 136 09/11/2020 12:00 AM    Potassium 4.4 09/11/2020 12:00 AM    Chloride 104 09/11/2020 12:00 AM    CO2 20 09/11/2020 12:00 AM    Anion gap 7 02/26/2020 09:55 PM    Glucose 142 (H) 09/11/2020 12:00 AM    BUN 8 09/11/2020 12:00 AM    Creatinine 0.50 (L) 09/11/2020 12:00 AM    BUN/Creatinine ratio 16 09/11/2020 12:00 AM    GFR est  09/11/2020 12:00 AM    GFR est non- 09/11/2020 12:00 AM    Calcium 9.6 09/11/2020 12:00 AM    Bilirubin, total 0.6 09/11/2020 12:00 AM    Alk.  phosphatase 123 (H) 09/11/2020 12:00 AM    Protein, total 7.4 09/11/2020 12:00 AM    Albumin 4.4 09/11/2020 12:00 AM    Globulin 4.0 02/26/2020 09:55 PM    A-G Ratio 1.5 09/11/2020 12:00 AM    ALT (SGPT) 12 09/11/2020 12:00 AM     Lab Results   Component Value Date/Time    Cholesterol, total 132 09/11/2020 12:00 AM    HDL Cholesterol 25 (L) 09/11/2020 12:00 AM    LDL, calculated 47 09/11/2020 12:00 AM    VLDL, calculated 60 (H) 09/11/2020 12:00 AM    Triglyceride 398 (H) 09/11/2020 12:00 AM     Lab Results   Component Value Date/Time    WBC 7.1 09/11/2020 12:00 AM    HGB 10.4 (L) 09/11/2020 12:00 AM    HCT 33.7 (L) 09/11/2020 12:00 AM    PLATELET 382 07/78/0959 12:00 AM    MCV 75 (L) 09/11/2020 12:00 AM       No results found for: MCACR, MCA1, MCA2, MCA3, MCAU, MCAU2, MCALPOCT    Lab Results   Component Value Date/Time    Hemoglobin A1c 6.5 (H) 04/07/2021 12:00 AM    Hemoglobin A1c 6.0 (H) 09/11/2020 12:00 AM    Hemoglobin A1c 5.9 (H) 10/08/2015 11:13 AM     Hemoglobin A1c (POC)   Date Value Ref Range Status   09/07/2017 5.4 % Final        CrCl cannot be calculated (Patient's most recent lab result is older than the maximum 180 days allowed. ). A/P:    1) T2DM: Per ADA guidelines, Pt's A1c is not at goal of < 6.5%. Current SMBG(s)/CGM trend is slightly elevated for tight control but is controlled for an A1c <7%. Pt is not at max clinically effective dose of Metformin. Side effects have improved, but they are not completely resolved. Will continue current dose. - Continue Metformin 5 mL (500 mg) BID  - Encouraged increasing fiber to improve loose stools  - Draw A1c today    2) HTN: BP is at goal of < 130/80. Currently taking no meds. UACR not completed. - Draw UACR at next visit     3) Primary Prevention ASCVD: Per ADA guidelines, pts age 43-69 yrs are recommended for statin therapy.  Pt is <40 yrs old.  LDL was <100 mg/dL (Sept 2020).  Updated lipid panel is needed. - Draw Lipid panel at next visit      Established patient-centered goal(s) to follow up on at the next visit:    - Exercise 150 minutes per week. - 5% weight loss = 8-10 lbs    Check: Vitals and Weight at the next visit. Medication reconciliation was completed during the visit. There are no discontinued medications. Orders Placed This Encounter    HEMOGLOBIN A1C WITH EAG     Standing Status:   Future     Number of Occurrences:   1     Standing Expiration Date:   7/6/2022       Patient verbalized understanding of the information presented and all of the patients questions were answered.   AVS was handed to the patient. Patient advised to call the office with any additional questions or concerns. Notifications of recommendations will be sent to Dr. Mary Porras NP for review. Patient will return to clinic in 12 week(s) for follow up. Mala Hough PharmD, BCGP  Clinical Pharmacist Specialist          For Pharmacy Admin Tracking Only     CPA in place:  Yes   Recommendation Provided To: Patient/Caregiver: 2 via In person   Intervention Detail: Lab(s) Ordered and Scheduled Appointment   Gap Closed?: No   Total # of Interventions Recommended: 2   Total # of Interventions Accepted: 2   Intervention Accepted By: Patient/Caregiver: 2   Time Spent (min): 30

## 2021-07-07 LAB
EST. AVERAGE GLUCOSE BLD GHB EST-MCNC: 131 MG/DL
HBA1C MFR BLD: 6.2 % (ref 4.8–5.6)

## 2021-10-04 ENCOUNTER — OFFICE VISIT (OUTPATIENT)
Dept: FAMILY MEDICINE CLINIC | Age: 31
End: 2021-10-04

## 2021-10-04 VITALS
DIASTOLIC BLOOD PRESSURE: 77 MMHG | HEART RATE: 74 BPM | BODY MASS INDEX: 33.66 KG/M2 | SYSTOLIC BLOOD PRESSURE: 123 MMHG | WEIGHT: 179.6 LBS

## 2021-10-04 DIAGNOSIS — E11.9 DIABETES MELLITUS TYPE 2, DIET-CONTROLLED (HCC): Primary | ICD-10-CM

## 2021-10-04 NOTE — PROGRESS NOTES
Pharmacy Progress Note - Diabetes Management    S/O: Ms. Darling Pleitez is a 32 y.o. female, referred by Dr. Marlan Schwab, NP, with a PMH of T2DM, PCOS, GERD, was seen today for diabetes management.  Patient's last A1c was 6.2% (July 2021) which was improved from A1c 6.5% (April 2021).           Blooie  #[id number] utilized during this visit. Interim update: Pt has been extremely stressed d/t Taliban taking over in her [place]. Pt requests refill of test strips as she is running low on supply. SHx:  - Pt from [place] but has been in [facility] x6 yrs. Medication Adherence/Access:  - Has Medicaid - meds are at no cost    Current anti-hyperglycemic regimen includes:    - Metformin 500 mg/5 mL 500 mg BID    ROS:  Today, Pt endorses:  - Symptoms of Hyperglycemia: none  - Symptoms of Hypoglycemia: none    Self Monitoring Blood Glucose (SMBG) or CGM:  - Brought in home glucometer/blood glucose log/CGM reader today:  no  - Checks BG: BID - fasting and post prandial  - Reports BG elevated up to 200 with stress  - Fasting BG back to 107-120 in the past few weeks    Nutrition:  - Eats low carb meals  - Reduced rice intake    Physical Activity:   yes  - walking every day     Diabetes Health Maintenance:  · ASCVD Risk Factors: Diabetes and Lack of statin therapy  · ASA therapy:  none   · ACE/ARB therapy: none  · Optimized statin therapy: none  · The ASCVD Risk score (Jamison Giron, et al., 2013) failed to calculate for the following reasons:  ·   The 2013 ASCVD risk score is only valid for ages 36 to 78    · LDL: 47 mg/dL (9/11/20)  · Nicotine dependence: No  · Last eye exam: none  · Last foot exam: none  · Last influenza vaccine: 11/8/18  · Last Pneumovax 23 vaccine: 6/7/21  · Last Prevnar-13 vaccine: none  · Hepatitis B Series: unknown   · COVID-19 vaccine: 4/14/21, 5/5/21      Vitals:   Wt Readings from Last 3 Encounters:   07/06/21 178 lb 9.6 oz (81 kg)   06/07/21 177 lb 3.2 oz (80.4 kg)   05/07/21 177 lb (80.3 kg)     BP Readings from Last 3 Encounters:   05/07/21 (!) 105/49   05/04/21 106/69   04/27/21 120/76     Pulse Readings from Last 3 Encounters:   05/07/21 75   05/04/21 67   09/11/20 82       Past Medical History:   Diagnosis Date    Anemia      No Known Allergies    Current Outpatient Medications   Medication Sig    metFORMIN 500 mg/5 mL soln Take 500 mg by mouth two (2) times a day.  Blood-Glucose Meter monitoring kit Check blood glucose 4x daily ac TID and qhs    glucose blood VI test strips (ASCENSIA AUTODISC VI, ONE TOUCH ULTRA TEST VI) strip Check blood glucose 4x daily ac TID and qhs    lancets misc Check blood glucose 4x daily ac TID and qhs    OMEPRAZOLE PO Take  by mouth daily.  acetaminophen (TYLENOL 8 HOUR PO) Take  by mouth. No current facility-administered medications for this visit. Lab Results   Component Value Date/Time    Sodium 136 09/11/2020 12:00 AM    Potassium 4.4 09/11/2020 12:00 AM    Chloride 104 09/11/2020 12:00 AM    CO2 20 09/11/2020 12:00 AM    Anion gap 7 02/26/2020 09:55 PM    Glucose 142 (H) 09/11/2020 12:00 AM    BUN 8 09/11/2020 12:00 AM    Creatinine 0.50 (L) 09/11/2020 12:00 AM    BUN/Creatinine ratio 16 09/11/2020 12:00 AM    GFR est  09/11/2020 12:00 AM    GFR est non- 09/11/2020 12:00 AM    Calcium 9.6 09/11/2020 12:00 AM    Bilirubin, total 0.6 09/11/2020 12:00 AM    Alk.  phosphatase 123 (H) 09/11/2020 12:00 AM    Protein, total 7.4 09/11/2020 12:00 AM    Albumin 4.4 09/11/2020 12:00 AM    Globulin 4.0 02/26/2020 09:55 PM    A-G Ratio 1.5 09/11/2020 12:00 AM    ALT (SGPT) 12 09/11/2020 12:00 AM     Lab Results   Component Value Date/Time    Cholesterol, total 132 09/11/2020 12:00 AM    HDL Cholesterol 25 (L) 09/11/2020 12:00 AM    LDL, calculated 47 09/11/2020 12:00 AM    VLDL, calculated 60 (H) 09/11/2020 12:00 AM    Triglyceride 398 (H) 09/11/2020 12:00 AM     Lab Results   Component Value Date/Time    WBC 7.1 09/11/2020 12:00 AM HGB 10.4 (L) 09/11/2020 12:00 AM    HCT 33.7 (L) 09/11/2020 12:00 AM    PLATELET 963 58/22/6588 12:00 AM    MCV 75 (L) 09/11/2020 12:00 AM       No results found for: MCACR, MCA1, MCA2, MCA3, MCAU, MCAU2, MCALPOCT    Lab Results   Component Value Date/Time    Hemoglobin A1c 6.2 (H) 07/06/2021 11:40 AM    Hemoglobin A1c 6.5 (H) 04/07/2021 12:00 AM    Hemoglobin A1c 6.0 (H) 09/11/2020 12:00 AM     Hemoglobin A1c (POC)   Date Value Ref Range Status   09/07/2017 5.4 % Final        CrCl cannot be calculated (Patient's most recent lab result is older than the maximum 180 days allowed. ). A/P:    1) T2DM: Per ADA guidelines, Pt's A1c is at goal of < 6.5%. Current SMBG(s)/CGM trend is controlled; however, pt reported BG excursions d/t stress. BG has improved over the past few weeks. Not at max clinically effective dose of Metformin. Tolerates well. Will continue current dose. Pt is controlled, will decrease at home testing.  - Continue Metformin 5 mL (500 mg) BID  - Encouraged continue adherence to low carb diet and physical activity  - Refill provided for test strips  - Pt instructed to test every day instead of BID  - Draw A1c today  - Draw CMP today    2) HTN: BP is at goal of < 130/80. Currently not on meds. UACR not completed. - Draw UACR today    3) Primary Prevention ASCVD: Per ADA guidelines, pts age 43-69 yrs are recommended for statin therapy. Currently not taking meds. Pt is <40 yrs. LDL <100 mg/dL (Sept 2020). Due for new lipid panel.  - Draw lipid panel today      Medication reconciliation was completed during the visit.     Medications Discontinued During This Encounter   Medication Reason    glucose blood VI test strips (ASCENSIA AUTODISC VI, ONE TOUCH ULTRA TEST VI) strip REORDER     Orders Placed This Encounter    LIPID PANEL     Standing Status:   Future     Number of Occurrences:   1     Standing Expiration Date:   10/4/2022    MICROALBUMIN, UR, RAND W/ MICROALB/CREAT RATIO     Standing Status:   Future     Number of Occurrences:   1     Standing Expiration Date:   10/4/2022    HEMOGLOBIN A1C WITH EAG     Standing Status:   Future     Number of Occurrences:   1     Standing Expiration Date:   85/9/5067    METABOLIC PANEL, COMPREHENSIVE     Standing Status:   Future     Number of Occurrences:   1     Standing Expiration Date:   10/4/2022    glucose blood VI test strips (ASCENSIA AUTODISC VI, ONE TOUCH ULTRA TEST VI) strip     Sig: Check blood glucose 4x daily ac TID and qhs     Dispense:  100 Strip     Refill:  5       Patient verbalized understanding of the information presented and all of the patients questions were answered. AVS was handed to the patient. Patient advised to call the office with any additional questions or concerns. Notifications of recommendations will be sent to Dr. Gris Elizalde, NP for review. Patient will return to clinic in 24 week(s) for follow up. Mallory Moya PharmD, [facility]  Clinical Pharmacist Specialist          For Pharmacy Admin Tracking Only     CPA in place:  Yes   Recommendation Provided To: Patient/Caregiver: 3 via In person   Intervention Detail: Lab(s) Ordered, Refill(s) Provided and Scheduled Appointment   Gap Closed?: No   Intervention Accepted By: Patient/Caregiver: 3   Time Spent (min): 30

## 2021-10-04 NOTE — PATIENT INSTRUCTIONS
Your A1c was 6.2% which was controlled. Goal A1c is less than 6.5%. You only have to test your blood sugar once a day. I have sent a refill for test strips.     Blood Sugar Goal  Fastin-100 mg/dL  1-2 after meals: Less than 140 mg/dL    Carbohydrate Goals  Meal: 30-45 grams   Snacks: 15 grams    Pharmacist Contact Information:  Tee Ryees, KeithD, [address]  Work Cell: [phone]

## 2021-10-05 LAB
ALBUMIN SERPL-MCNC: 4.5 G/DL (ref 3.8–4.8)
ALBUMIN/CREAT UR: 8 MG/G CREAT (ref 0–29)
ALBUMIN/GLOB SERPL: 1.3 {RATIO} (ref 1.2–2.2)
ALP SERPL-CCNC: 155 IU/L (ref 44–121)
ALT SERPL-CCNC: 19 IU/L (ref 0–32)
AST SERPL-CCNC: 18 IU/L (ref 0–40)
BILIRUB SERPL-MCNC: 0.4 MG/DL (ref 0–1.2)
BUN SERPL-MCNC: 11 MG/DL (ref 6–20)
BUN/CREAT SERPL: 23 (ref 9–23)
CALCIUM SERPL-MCNC: 9.6 MG/DL (ref 8.7–10.2)
CHLORIDE SERPL-SCNC: 106 MMOL/L (ref 96–106)
CHOLEST SERPL-MCNC: 127 MG/DL (ref 100–199)
CO2 SERPL-SCNC: 21 MMOL/L (ref 20–29)
CREAT SERPL-MCNC: 0.48 MG/DL (ref 0.57–1)
CREAT UR-MCNC: 131.3 MG/DL
EST. AVERAGE GLUCOSE BLD GHB EST-MCNC: 137 MG/DL
GLOBULIN SER CALC-MCNC: 3.4 G/DL (ref 1.5–4.5)
GLUCOSE SERPL-MCNC: 200 MG/DL (ref 65–99)
HBA1C MFR BLD: 6.4 % (ref 4.8–5.6)
HDLC SERPL-MCNC: 27 MG/DL
IMP & REVIEW OF LAB RESULTS: NORMAL
LDLC SERPL CALC-MCNC: 48 MG/DL (ref 0–99)
MICROALBUMIN UR-MCNC: 9.9 UG/ML
POTASSIUM SERPL-SCNC: 4.7 MMOL/L (ref 3.5–5.2)
PROT SERPL-MCNC: 7.9 G/DL (ref 6–8.5)
SODIUM SERPL-SCNC: 139 MMOL/L (ref 134–144)
TRIGL SERPL-MCNC: 341 MG/DL (ref 0–149)
VLDLC SERPL CALC-MCNC: 52 MG/DL (ref 5–40)

## 2021-10-06 ENCOUNTER — TELEPHONE (OUTPATIENT)
Dept: FAMILY MEDICINE CLINIC | Age: 31
End: 2021-10-06

## 2021-10-06 NOTE — TELEPHONE ENCOUNTER
Called, spoke to pt. Via   Two pt identifiers confirmed. Writer informed that labs are stable and that letter will be mail.

## 2021-11-08 ENCOUNTER — OFFICE VISIT (OUTPATIENT)
Dept: FAMILY MEDICINE CLINIC | Age: 31
End: 2021-11-08

## 2021-11-08 VITALS
HEART RATE: 71 BPM | WEIGHT: 180.8 LBS | RESPIRATION RATE: 16 BRPM | BODY MASS INDEX: 35.5 KG/M2 | SYSTOLIC BLOOD PRESSURE: 107 MMHG | TEMPERATURE: 98.4 F | OXYGEN SATURATION: 99 % | DIASTOLIC BLOOD PRESSURE: 52 MMHG | HEIGHT: 60 IN

## 2021-11-08 DIAGNOSIS — M25.561 CHRONIC PAIN OF BOTH KNEES: Primary | ICD-10-CM

## 2021-11-08 DIAGNOSIS — G89.29 CHRONIC PAIN OF BOTH KNEES: Primary | ICD-10-CM

## 2021-11-08 DIAGNOSIS — E11.9 DIABETES MELLITUS TYPE 2, DIET-CONTROLLED (HCC): ICD-10-CM

## 2021-11-08 DIAGNOSIS — M25.562 CHRONIC PAIN OF BOTH KNEES: Primary | ICD-10-CM

## 2021-11-08 DIAGNOSIS — Z23 NEEDS FLU SHOT: ICD-10-CM

## 2021-11-08 PROCEDURE — 99214 OFFICE O/P EST MOD 30 MIN: CPT | Performed by: NURSE PRACTITIONER

## 2021-11-08 PROCEDURE — 90686 IIV4 VACC NO PRSV 0.5 ML IM: CPT | Performed by: NURSE PRACTITIONER

## 2021-11-08 NOTE — PROGRESS NOTES
Assessment/Plan:     Diagnoses and all orders for this visit:    1. Chronic pain of both knees  -     XR KNEE LT 3 V; Future  -     XR KNEE RT 3 V; Future  Etiology of her extremity pain is unclear. Sensation is intact x-rays pending. Will refill her meloxicam on an as-needed basis. 2. Diabetes mellitus type 2, diet-controlled ([address]  -      DIABETES FOOT EXAM  -     REFERRAL TO OPHTHALMOLOGY  Unfortunately she is intolerant to Metformin. She is attempting to become pregnant. Will consider alternatives including insulin therapy with the pharmacist as well. With her A1c currently at 6.5%, she will likely require insulin during pregnancy if she becomes pregnant. 3. Needs flu shot  -     INFLUENZA VIRUS VAC QUAD,SPLIT,PRESV FREE SYRINGE IM             Discussed expected course/resolution/complications of diagnosis in detail with patient. Medication risks/benefits/costs/interactions/alternatives discussed with patient. Pt was given after visit summary which includes diagnoses, current medications & vitals. Pt expressed understanding with the diagnosis and plan          Subjective:      Angel Ace is a 32 y.o. female who presents for had concerns including Leg Pain, Headache, and Chest Pain. Translation is assisted through translation phone services as the patient is Margarita speaking only. Reports a longstanding history of bilateral lower extremity pain. Symptoms are aching in nature. Has attempted NSAIDs in the past with benefit. No prior imaging or specialist referral.  Symptoms are stable over time. Patient is a 32 y.o. female that comes today for follow up of Diabetes Mellitus Type 2. Patient does regularly monitor  their FSBG at home. The fasting plasma glucose (fpg) usually runs around 130 mg/L. generally follows a low fat low cholesterol diet  Patients activity level: sedentery  there was no change in patient's weight. .  The patient has not experienced recent hypoglycemia. reports that she has never smoked. She has never used smokeless tobacco.    Reports stomach upset with Metformin that is occasionally dispelled through the use of PPI therapy every day. Lab Results   Component Value Date/Time    Hemoglobin A1c 6.4 (H) 10/04/2021 11:04 AM    Hemoglobin A1c 6.2 (H) 07/06/2021 11:40 AM    Hemoglobin A1c 6.5 (H) 04/07/2021 12:00 AM         Patient Active Problem List   Diagnosis Code    Chronic tension-type headache, not intractable G44.229    Chronic frontal sinusitis J32.1    GERD (gastroesophageal reflux disease) K21.9    Abdominal pain R10.9    Microcytic hypochromic anemia D50.9    Idiopathic urticaria L50.1    Bilateral flank pain R10.9    Female pelvic pain R10.2    Missed period N92.6    Crystalluria R82.998    Acute recurrent maxillary sinusitis J01.01    Body aches R52    H/O miscarriage, not currently pregnant Z87.59    Vaginal bleeding, abnormal N93.9    DUB (dysfunctional uterine bleeding) N93.8    Strain of left trapezius muscle S46.812A    Slow transit constipation K59.01    Left cervical radiculopathy M54.12    Allergic sinusitis J30.9    Secondary amenorrhea N91.1    Vitamin D deficiency E55.9    Severe obesity (HCC) E66.01       Current Outpatient Medications   Medication Sig Dispense Refill    glucose blood VI test strips (ASCENSIA AUTODISC VI, ONE TOUCH ULTRA TEST VI) strip Check blood glucose 4x daily ac TID and qhs 100 Strip 5    metFORMIN 500 mg/5 mL soln Take 500 mg by mouth two (2) times a day. 300 mL 3    Blood-Glucose Meter monitoring kit Check blood glucose 4x daily ac TID and qhs 1 Kit 0    lancets misc Check blood glucose 4x daily ac TID and qhs 100 Each 11    OMEPRAZOLE PO Take  by mouth daily.  acetaminophen (TYLENOL 8 HOUR PO) Take  by mouth. No Known Allergies    ROS:   Review of Systems   Constitutional: Negative for malaise/fatigue. Eyes: Negative for blurred vision.    Respiratory: Negative for shortness of breath. Cardiovascular: Negative for chest pain. Musculoskeletal: Positive for myalgias. Objective:     Visit Vitals  BP (!) 107/52 (BP 1 Location: Right upper arm, BP Patient Position: Sitting, BP Cuff Size: Large adult long)   Pulse 71   Temp 98.4 °F (36.9 °C) (Temporal)   Resp 16   Ht 5' (1.524 m)   Wt 180 lb 12.8 oz (82 kg)   LMP 09/07/2021 (Exact Date)   SpO2 99%   BMI 35.31 kg/m²       Vitals and Nurse Documentation reviewed. Physical Exam  Constitutional:       General: She is not in acute distress. Cardiovascular:      Heart sounds: S1 normal and S2 normal. No murmur heard. No friction rub. No gallop. Pulmonary:      Effort: No respiratory distress. Breath sounds: Normal breath sounds. Musculoskeletal:      Right upper leg: Tenderness present. Left upper leg: Tenderness present. Right knee: No swelling or effusion. Left knee: No swelling or effusion. Right lower leg: No tenderness. Left lower leg: No tenderness. Feet:      Right foot:      Protective Sensation: 4 sites tested. 4 sites sensed. Left foot:      Protective Sensation: 4 sites tested. 4 sites sensed. Skin:     General: Skin is warm and dry.    Psychiatric:         Mood and Affect: Mood and affect normal.

## 2021-11-08 NOTE — PROGRESS NOTES
Chief Complaint   Patient presents with    Leg Pain    Headache    Chest Pain       1. \"Have you been to the ER, urgent care clinic since your last visit? Hospitalized since your last visit? \" No    2. \"Have you seen or consulted any other health care providers outside of the [address] [place] [address] since your last visit? \" No     3. For patients over 45: Has the patient had a colonoscopy? No     If the patient is female:    4. For patients over 40: Has the patient had a mammogram? No    5. For patients over 21: Has the patient had a pap smear?  No    3 most recent PHQ Screens 11/8/2021   Little interest or pleasure in doing things Not at all   Feeling down, depressed, irritable, or hopeless Not at all   Total Score PHQ 2 0     Health Maintenance Due   Topic Date Due    Hepatitis C Screening  Never done    Foot Exam Q1  Never done    Eye Exam Retinal or Dilated  Never done    DTaP/Tdap/Td series (1 - Tdap) Never done    Flu Vaccine (1) 09/01/2021

## 2021-11-09 RX ORDER — MELOXICAM 15 MG/1
15 TABLET ORAL
Qty: 30 TABLET | Refills: 1 | Status: SHIPPED | OUTPATIENT
Start: 2021-11-09

## 2021-11-12 ENCOUNTER — OFFICE VISIT (OUTPATIENT)
Dept: OBGYN CLINIC | Age: 31
End: 2021-11-12
Payer: MEDICAID

## 2021-11-12 VITALS
DIASTOLIC BLOOD PRESSURE: 76 MMHG | WEIGHT: 180 LBS | SYSTOLIC BLOOD PRESSURE: 120 MMHG | HEIGHT: 60 IN | BODY MASS INDEX: 35.34 KG/M2

## 2021-11-12 DIAGNOSIS — N92.6 IRREGULAR MENSES: ICD-10-CM

## 2021-11-12 DIAGNOSIS — E88.81 METABOLIC SYNDROME: ICD-10-CM

## 2021-11-12 DIAGNOSIS — E28.2 PCOS (POLYCYSTIC OVARIAN SYNDROME): ICD-10-CM

## 2021-11-12 DIAGNOSIS — Z31.41 FERTILITY TESTING: Primary | ICD-10-CM

## 2021-11-12 PROCEDURE — 99213 OFFICE O/P EST LOW 20 MIN: CPT | Performed by: OBSTETRICS & GYNECOLOGY

## 2021-11-12 RX ORDER — MEDROXYPROGESTERONE ACETATE 10 MG/1
10 TABLET ORAL DAILY
Qty: 10 TABLET | Refills: 11 | Status: SHIPPED | OUTPATIENT
Start: 2021-11-12 | End: 2021-11-22

## 2021-11-12 NOTE — PATIENT INSTRUCTIONS
Abnormal Uterine Bleeding: Care Instructions  Your Care Instructions     Abnormal uterine bleeding is irregular bleeding from the uterus that is longer or heavier than usual or does not occur at your regular time. Sometimes it is caused by changes in hormone levels. It can also be caused by growths in the uterus, such as fibroids or polyps. Sometimes a cause cannot be found. You may have heavy bleeding when you are not expecting your period. Your doctor may suggest a pregnancy test, if you think you are pregnant. Follow-up care is a key part of your treatment and safety. Be sure to make and go to all appointments, and call your doctor if you are having problems. It's also a good idea to know your test results and keep a list of the medicines you take. How can you care for yourself at home? · Be safe with medicines. Take pain medicines exactly as directed. ? If the doctor gave you a prescription medicine for pain, take it as prescribed. ? If you are not taking a prescription pain medicine, ask your doctor if you can take an over-the-counter medicine. · You may be low in iron because of blood loss. Eat a balanced diet that is high in iron and vitamin C. Foods rich in iron include red meat, shellfish, eggs, beans, and leafy green vegetables. Talk to your doctor about whether you need to take iron pills or a multivitamin. When should you call for help? Call 911 anytime you think you may need emergency care. For example, call if:    · You passed out (lost consciousness). Call your doctor now or seek immediate medical care if:    · You have new or worse belly or pelvic pain.     · You have severe vaginal bleeding.     · You feel dizzy or lightheaded, or you feel like you may faint. Watch closely for changes in your health, and be sure to contact your doctor if:    · You think you may be pregnant.     · Your bleeding gets worse.     · You do not get better as expected. Where can you learn more?   Go to http://www.gray.com/  Enter [id number] in the search box to learn more about \"Abnormal Uterine Bleeding: Care Instructions. \"  Current as of: February 11, 2021               Content Version: 13.0  © 3212-2749 [employer]. Care instructions adapted under license by [employer] (which disclaims liability or warranty for this information). If you have questions about a medical condition or this instruction, always ask your healthcare professional. [address] any warranty or liability for your use of this information.

## 2021-11-12 NOTE — PROGRESS NOTES
Problem Visit     Nadine Silvestre is a 28 yo  A1 with suspected PCOS presenting for follow up of AUB (oligomenorrhea) and infertility. Menses have been irregular since SAB several years ago, occurring roughly every 6 months. Pt would like to conceive. Reports she has had weight gain over the past few years, has tried to lose weight without success. No concerns with hirsuitism or acne. Ultrasound previous visit showing BOV with polycystic appearance and possible endocervical polyp (appearance unchanged c/w prior, not visible on exam). At prior visit we checked A1c which returned at 6.5, in diabetic range, pt following with PCP and is taking metformin, most recent A1c 6.4. Prescribed cyclic provera at prior visit, took it in September and had a menstrual cycle, but was unable to get refill from pharmacy in Oct and November. Requests refill as she has not had a period since September. At prior visit she was also referred for HSG and partner referred for SA. Neither study has yet been completed. If these are normal, have discussed that we could consider ovulation induction agents. Labs from prior visit:  TSH, PRL, PCOS labs, FSH, LH, E2 all wnl.      Pt with elevated triglycerides. BMI 34. Likely metabolic sydrome.     Pt is from [place] but has been in [facility] x 6 years. Margarita  required and used for visit. TV ULTRASOUND 21  THE UTERUS IS UNSTABLE LIE, NORMAL IN SIZE AND ECHOGENICITY. THE ENDOMETRIUM MEASURES 14MM IN THICKNESS. THERE APPEARS TO BE A HYPERECHOIC AREA SEEN IN  THE CERVICAL PORTION OF THE ENDO MEASURING 15 X 12 X 14 MM, POSSIBLE POLYP. RIGHT OVARY APPEARS ENLARGED WITH MULTIPLE FOLLICLES SEEN ON THE PERIPHERY. LEFT OVARY APPEARS ENLARGED WITH MULTIPLE FOLLICLES SEEN ON THE PERIPHERY. NO FREE FLUID IS SEEN IN THE CDS. TRANSVAGINAL ULTRASOUND PERFORMED 2020. THE UTERUS IS ANTEVERTED,NORMAL IN SIZE AND ECHOGENICITY.   ENDOMETRIUM MEASURES 10MM IN THICKNESS. THERE APPEARS TO BE A HYPERECHOIC AREA SEEN  WITHIN THE ENDOMETRIUM MEASURING 15 X 12MM, POSSIBLE POLYP VS. [place] . CLINICAL  COORELATION RECOMMENDED. THE RIGHT OVARY APPEARS TO HAVE MULTIPLE FOLLICLES SEEN ALONG THE PERIPHERY. THE LEFT OVARY APPEARS TO HAVE MULTIPLE FOLLICLES SEEN ALONG THE PERIPHERY. NO FREE FLUID SEEN IN THE CDS.     Ob/Gyn Hx:   A1 -  x1 (), SAB x1  LMP- 21  Menses- irregular since SAB in   Contraception-no, ttc  STI- no  ?SA-yes     Health maintenance:  Pap-20 NILM  Gardasil-denies           Past Medical History:   Diagnosis Date    Anemia                 Past Surgical History:   Procedure Laterality Date    HX APPENDECTOMY                   Family History   Problem Relation Age of Onset    No Known Problems Mother      No Known Problems Father           Social History            Socioeconomic History    Marital status:        Spouse name: Not on file    Number of children: Not on file    Years of education: Not on file    Highest education level: Not on file   Occupational History    Not on file   Social Needs    Financial resource strain: Not on file    Food insecurity       Worry: Not on file       Inability: Not on file    Transportation needs       Medical: Not on file       Non-medical: Not on file   Tobacco Use    Smoking status: Never Smoker    Smokeless tobacco: Never Used   Substance and Sexual Activity    Alcohol use:  No       Alcohol/week: 0.0 standard drinks    Drug use: No    Sexual activity: Yes       Partners: Male       Birth control/protection: None   Lifestyle    Physical activity       Days per week: Not on file       Minutes per session: Not on file    Stress: Not on file   Relationships    Social connections       Talks on phone: Not on file       Gets together: Not on file       Attends Protestant service: Not on file       Active member of club or organization: Not on file       Attends meetings of clubs or organizations: Not on file       Relationship status: Not on file    Intimate partner violence       Fear of current or ex partner: Not on file       Emotionally abused: Not on file       Physically abused: Not on file       Forced sexual activity: Not on file   Other Topics Concern    Not on file   Social History Narrative    Not on file                Current Outpatient Medications   Medication Sig Dispense Refill    ferrous sulfate (IRON) 325 mg (65 mg iron) EC tablet Take 1 Tab by mouth daily. Take with food.  90 Tab 0    acetaminophen (TYLENOL 8 HOUR PO) Take  by mouth.             No Known Allergies     Review of Systems - History obtained from the patient  Constitutional: negative for weight loss, fever, night sweats  HEENT: negative for hearing loss, earache, congestion, snoring, sorethroat  CV: negative for chest pain, palpitations, edema  Resp: negative for cough, shortness of breath, wheezing  GI: negative for change in bowel habits, abdominal pain, black or bloody stools  : negative for frequency, dysuria, hematuria, vaginal discharge, +AUB, pelvic pain, infertility  MSK: negative for back pain, joint pain, muscle pain  Breast: negative for breast lumps, nipple discharge, galactorrhea  Skin :negative for itching, rash, hives  Neuro: negative for dizziness, headache, confusion, weakness  Psych: negative for anxiety, depression, change in mood  Heme/lymph: negative for bleeding, bruising, pallor     Physical Exam  Visit Vitals  /76   Ht 5' (1.524 m)   Wt 180 lb (81.6 kg)   LMP 09/07/2021   BMI 35.15 kg/m²     PHYSICAL EXAMINATION    Constitutional  · Appearance: well-nourished, well developed, alert, in no acute distress    HENT  · Head and Face: appears normal    Chest  · Respiratory Effort: breathing non-labored  · Auscultation: normal breath sounds    Cardiovascular  · Heart:  · Auscultation: regular rate and rhythm without murmur    Gastrointestinal  · Abdominal Examination: abdomen non-tender to palpation, normal bowel sounds, no masses present  · Liver and spleen: no hepatomegaly present, spleen not palpable  · Hernias: no hernias identified    Neurologic/Psychiatric  · Mental Status:  · Orientation: grossly oriented to person, place and time  · Mood and Affect: mood normal, affect appropriate        Assessment/Plan:  32 y.o. A1 with AUB/oligomenorrhea and infertility. Likely PCOS given polycystic appearance to ovaries, though no clinical or lab signs of hyperandrogenism. Anovulatory cycles due to obesity also possible. Also with metabolic syndrome given obesity, elevated TGs and DM2. ?1.5cm endocervical polyp seen on prior US.     -encourage ongoing efforts at weight loss, discussed diet/exercise, informed pt that weight loss can often help menses return spontaneously  -reviewed A1c of 6.4 and encouraged compliance with metformin and f/u with PCP for DM and XOL  -continue cyclic provera 16NS x 90O per month for endometrial protection  -cont PNV, toxin avoidance, menstrual calendar  -complete fertility workup --> SA, HSG referrals again provided today, AMH level  -if everything normal, consider letrazole for ovulation induction once medical conditions optimized     RTC: 2-3 months for follow up after SA, HSG and after she has achieved better control of DM    Quique Costa MD  2021  4:30 PM

## 2021-11-15 ENCOUNTER — TELEPHONE (OUTPATIENT)
Dept: OBGYN CLINIC | Age: 31
End: 2021-11-15

## 2021-11-18 LAB — MIS SERPL-MCNC: 6.74 NG/ML

## 2022-01-18 ENCOUNTER — OFFICE VISIT (OUTPATIENT)
Dept: OBGYN CLINIC | Age: 32
End: 2022-01-18
Payer: MEDICAID

## 2022-01-18 VITALS
BODY MASS INDEX: 35.34 KG/M2 | SYSTOLIC BLOOD PRESSURE: 126 MMHG | DIASTOLIC BLOOD PRESSURE: 84 MMHG | HEIGHT: 60 IN | WEIGHT: 180 LBS

## 2022-01-18 DIAGNOSIS — Z31.9 INFERTILITY MANAGEMENT: ICD-10-CM

## 2022-01-18 DIAGNOSIS — N92.6 IRREGULAR MENSES: ICD-10-CM

## 2022-01-18 DIAGNOSIS — N89.8 VAGINAL DISCHARGE: Primary | ICD-10-CM

## 2022-01-18 DIAGNOSIS — E28.2 PCOS (POLYCYSTIC OVARIAN SYNDROME): ICD-10-CM

## 2022-01-18 PROCEDURE — 99214 OFFICE O/P EST MOD 30 MIN: CPT | Performed by: OBSTETRICS & GYNECOLOGY

## 2022-01-18 NOTE — PATIENT INSTRUCTIONS
Learning About Infertility Testing  What is infertility testing? Infertility tests help find out why you cannot get pregnant. These tests include a physical exam, semen analysis, blood tests, and other procedures. Many of these tests are done in your doctor's office or clinic. Some other procedures may be done in a hospital.  What happens during testing? Your doctor will first ask you questions about your medical history and do a physical exam to check your health. You may also have other tests to check for specific problems. Tests that may be done include:  · Blood or urine tests. These tests check hormones and look for sexually transmitted infections. · Semen analysis. This test checks the number of sperm and how they move. · Ultrasound. A pelvic ultrasound looks at the size and structure of the uterus and ovaries. · X-ray. A special kind of X-ray looks at the inside of the uterus and fallopian tubes. · Hysteroscopy. This procedure looks at the lining of the uterus using a thin, lighted scope that is put through the vagina and cervix into the uterus. If the problem is still not found, other tests and procedures may be done. What can you expect after this testing? Sometimes tests can't find the problem. And not all infertility problems can be treated. But you may still be able to get pregnant with techniques like in vitro fertilization. What else should you know? Infertility tests can cost a lot. And they can be stressful and take a lot of time. In some cases, you may not find a problem even after many tests. So it is important to decide how many tests you might want to try. Where can you learn more? Go to http://www.gray.com/  Enter B411 in the search box to learn more about \"Learning About Infertility Testing. \"  Current as of: June 16, 2021               Content Version: 13.0  © 5505-2239 [employer].    Care instructions adapted under license by [employer] (which disclaims liability or warranty for this information). If you have questions about a medical condition or this instruction, always ask your healthcare professional. [address] any warranty or liability for your use of this information.

## 2022-01-18 NOTE — PROGRESS NOTES
Problem Visit     Keven Mesa is a 26 yo  A1 with suspected PCOS presenting for follow up of AUB (oligomenorrhea) and infertility. Menses have been irregular since SAB several years ago, occurring roughly every 6 months. Pt would like to conceive. Reports she has had weight gain over the past few years, has tried to lose weight without success. No concerns with hirsuitism or acne. Ultrasound previous visit showing BOV with polycystic appearance and possible endocervical polyp (appearance unchanged c/w prior, not visible on exam). At prior visit we checked A1c which returned at 6.5, in diabetic range, pt following with PCP and is taking metformin, most recent A1c 6.4. At prior visit she was also referred for HSG and partner referred for SA. Neither study has yet been completed. If these are normal, have discussed that we could consider ovulation induction agents. She was also started on cyclic provera 10 x 10 at prior visit, she did have a menstrual cycle after doing this the first month, but didn't take it last month as she was having some GI upset. Plans to start again with provera in February. Only other concern today is discharge with odor. Labs from prior visit:  TSH, PRL, PCOS labs, FSH, LH, E2, AMH all wnl.      Pt with elevated triglycerides. BMI 34. Likely metabolic sydrome.     Pt is from [place] but has been in [facility] x 6 years. Margarita  required and used for visit. TV ULTRASOUND 21  THE UTERUS IS UNSTABLE LIE, NORMAL IN SIZE AND ECHOGENICITY. THE ENDOMETRIUM MEASURES 14MM IN THICKNESS. THERE APPEARS TO BE A HYPERECHOIC AREA SEEN IN  THE CERVICAL PORTION OF THE ENDO MEASURING 15 X 12 X 14 MM, POSSIBLE POLYP. RIGHT OVARY APPEARS ENLARGED WITH MULTIPLE FOLLICLES SEEN ON THE PERIPHERY. LEFT OVARY APPEARS ENLARGED WITH MULTIPLE FOLLICLES SEEN ON THE PERIPHERY. NO FREE FLUID IS SEEN IN THE CDS. TRANSVAGINAL ULTRASOUND PERFORMED 2020.   THE UTERUS IS ANTEVERTED,NORMAL IN SIZE AND ECHOGENICITY. ENDOMETRIUM MEASURES 10MM IN THICKNESS. THERE APPEARS TO BE A HYPERECHOIC AREA SEEN  WITHIN THE ENDOMETRIUM MEASURING 15 X 12MM, POSSIBLE POLYP VS. [place]. CLINICAL  COORELATION RECOMMENDED. THE RIGHT OVARY APPEARS TO HAVE MULTIPLE FOLLICLES SEEN ALONG THE PERIPHERY. THE LEFT OVARY APPEARS TO HAVE MULTIPLE FOLLICLES SEEN ALONG THE PERIPHERY. NO FREE FLUID SEEN IN THE CDS.     Ob/Gyn Hx:   A1 -  x1 (), SAB x1  LMP- 21  Menses- irregular since SAB in   Contraception-no, ttc  STI- no  ?SA-yes     Health maintenance:  Pap-20 NILM  Gardasil-denies           Past Medical History:   Diagnosis Date    Anemia                 Past Surgical History:   Procedure Laterality Date    HX APPENDECTOMY                   Family History   Problem Relation Age of Onset    No Known Problems Mother      No Known Problems Father           Social History            Socioeconomic History    Marital status:        Spouse name: Not on file    Number of children: Not on file    Years of education: Not on file    Highest education level: Not on file   Occupational History    Not on file   Social Needs    Financial resource strain: Not on file    Food insecurity       Worry: Not on file       Inability: Not on file    Transportation needs       Medical: Not on file       Non-medical: Not on file   Tobacco Use    Smoking status: Never Smoker    Smokeless tobacco: Never Used   Substance and Sexual Activity    Alcohol use:  No       Alcohol/week: 0.0 standard drinks    Drug use: No    Sexual activity: Yes       Partners: Male       Birth control/protection: None   Lifestyle    Physical activity       Days per week: Not on file       Minutes per session: Not on file    Stress: Not on file   Relationships    Social connections       Talks on phone: Not on file       Gets together: Not on file       Attends Jehovah's witness service: Not on file       Active member of club or organization: Not on file       Attends meetings of clubs or organizations: Not on file       Relationship status: Not on file    Intimate partner violence       Fear of current or ex partner: Not on file       Emotionally abused: Not on file       Physically abused: Not on file       Forced sexual activity: Not on file   Other Topics Concern    Not on file   Social History Narrative    Not on file                Current Outpatient Medications   Medication Sig Dispense Refill    ferrous sulfate (IRON) 325 mg (65 mg iron) EC tablet Take 1 Tab by mouth daily. Take with food.  90 Tab 0    acetaminophen (TYLENOL 8 HOUR PO) Take  by mouth.             No Known Allergies     Review of Systems - History obtained from the patient  Constitutional: negative for weight loss, fever, night sweats  HEENT: negative for hearing loss, earache, congestion, snoring, sorethroat  CV: negative for chest pain, palpitations, edema  Resp: negative for cough, shortness of breath, wheezing  GI: negative for change in bowel habits, abdominal pain, black or bloody stools  : negative for frequency, dysuria, hematuria, vaginal discharge, +AUB, pelvic pain, infertility  MSK: negative for back pain, joint pain, muscle pain  Breast: negative for breast lumps, nipple discharge, galactorrhea  Skin :negative for itching, rash, hives  Neuro: negative for dizziness, headache, confusion, weakness  Psych: negative for anxiety, depression, change in mood  Heme/lymph: negative for bleeding, bruising, pallor     Physical Exam  Visit Vitals  /84   Ht 5' (1.524 m)   Wt 180 lb (81.6 kg)   LMP 12/14/2021   BMI 35.15 kg/m²       PHYSICAL EXAMINATION    Constitutional  · Appearance: well-nourished, well developed, alert, in no acute distress    HENT  · Head and Face: appears normal    Neck  · Inspection/Palpation: normal appearance, no masses or tenderness  · Lymph Nodes: no lymphadenopathy present  · Thyroid: gland size normal, nontender, no nodules or masses present on palpation    Chest  · Respiratory Effort: breathing non-labored  · Auscultation: normal breath sounds    Cardiovascular  · Heart:  · Auscultation: regular rate and rhythm without murmur    Gastrointestinal  · Abdominal Examination: abdomen non-tender to palpation, normal bowel sounds, no masses present  · Liver and spleen: no hepatomegaly present, spleen not palpable  · Hernias: no hernias identified    Genitourinary  · External Genitalia: normal appearance for age, no discharge present, no tenderness present, no inflammatory lesions present, no masses present, no atrophy present  · Vagina: normal vaginal vault without central or paravaginal defects, small amt of thin clear discharge present, no inflammatory lesions present, no masses present  · Bladder: non-tender to palpation  · Urethra: appears normal  · Cervix: normal   · Uterus: normal size, shape and consistency  · Adnexa: no adnexal tenderness present, no adnexal masses present  · Perineum: perineum within normal limits, no evidence of trauma, no rashes or skin lesions present  · Anus: anus within normal limits, no hemorrhoids present  · Inguinal Lymph Nodes: no lymphadenopathy present    Skin  · General Inspection: no rash, no lesions identified    Neurologic/Psychiatric  · Mental Status:  · Orientation: grossly oriented to person, place and time  · Mood and Affect: mood normal, affect appropriate        Assessment/Plan:  32 y.o. A1 with AUB/oligomenorrhea and infertility. Likely PCOS given polycystic appearance to ovaries, though no clinical or lab signs of hyperandrogenism. Anovulatory cycles due to obesity also possible. Also with metabolic syndrome given obesity, elevated TGs and DM2. ?1.5cm endocervical polyp seen on prior US.  +vaginal discharge.    -encourage ongoing efforts at weight loss, discussed diet/exercise, informed pt that weight loss can often help menses return spontaneously  -reviewed A1c of 6.4 and encouraged compliance with metformin and f/u with PCP for DM and XOL  -continue cyclic provera 30HZ x 08A per month for endometrial protection  -cont PNV, toxin avoidance, menstrual calendar  -complete fertility workup --> SA, HSG referrals again provided today  -reviewed labs from prior visit  -if everything normal, consider letrazole for ovulation induction once medical conditions optimized  -nuswab vaginitis today     RTC: 2-3 months for follow up after SA, HSG and after she has achieved better control of DM    Bianka Phan MD  1/18/2022  10:54 AM           Billing: Total time of visit = 35 min  >50% of time was spent in direct face-to-face counseling with patient.

## 2022-01-22 LAB
A VAGINAE DNA VAG QL NAA+PROBE: NORMAL SCORE
BVAB2 DNA VAG QL NAA+PROBE: NORMAL SCORE
C ALBICANS DNA VAG QL NAA+PROBE: NEGATIVE
C GLABRATA DNA VAG QL NAA+PROBE: NEGATIVE
MEGA1 DNA VAG QL NAA+PROBE: NORMAL SCORE
SPECIMEN STATUS REPORT, ROLRST: NORMAL

## 2022-02-04 ENCOUNTER — TRANSCRIBE ORDER (OUTPATIENT)
Dept: SCHEDULING | Age: 32
End: 2022-02-04

## 2022-02-04 ENCOUNTER — TELEPHONE (OUTPATIENT)
Dept: OBGYN CLINIC | Age: 32
End: 2022-02-04

## 2022-02-04 DIAGNOSIS — Z12.31 VISIT FOR SCREENING MAMMOGRAM: Primary | ICD-10-CM

## 2022-02-04 NOTE — TELEPHONE ENCOUNTER
MD Stephan Holcomb, Almas Escobedo, RN; Laury NUNEZ  Caller: Unspecified (Today, 12:35 PM)  He needs to call central scheduling today to set up appointment for HSG. It may have to be done at [facility]. It is possible they will not be able to schedule until next month, as I have had another patient with difficulty scheduling this month. But either way, he should call today. Please provide phone number for central scheduling. Thanks.      Semen analysis referral was paper referral provided at time of

## 2022-02-04 NOTE — TELEPHONE ENCOUNTER
This nurse attempted a third time to reach the patient and left a detailed message regarding MD recommendations and provided the phone number to contact central scheduling to set up the appointment .     Message left about the paper referral to do a semen analysis

## 2022-02-04 NOTE — TELEPHONE ENCOUNTER
This nurse attempted again to reach the patient and left a detailed message to call the office back      Patient does not have my chart

## 2022-02-04 NOTE — TELEPHONE ENCOUNTER
Call received at 12:35PM    HIPPA verified to speak to  regarding patient      32year old patient last seen in the office on 1/178/2022       calling to say that is wife was told to call the office when she is on day two of her cycle    He is not sure for what though    See plan form 1/18/2022 visit      Please advise    Thank you

## 2022-03-02 NOTE — PROGRESS NOTES
Nuswab vaginitis neg Patient would like to change his decision and would like to stay    Agrees to medical treatment as directed by physicians    Agrees to respecting medical staff and not refusing care     Primary and consults updated

## 2022-03-18 PROBLEM — E55.9 VITAMIN D DEFICIENCY: Status: ACTIVE | Noted: 2017-09-26

## 2022-03-19 PROBLEM — N91.1 SECONDARY AMENORRHEA: Status: ACTIVE | Noted: 2017-09-26

## 2022-03-19 PROBLEM — E66.01 SEVERE OBESITY (HCC): Status: ACTIVE | Noted: 2020-09-11

## 2022-03-19 PROBLEM — J30.9 ALLERGIC SINUSITIS: Status: ACTIVE | Noted: 2017-06-15

## 2022-04-11 ENCOUNTER — OFFICE VISIT (OUTPATIENT)
Dept: FAMILY MEDICINE CLINIC | Age: 32
End: 2022-04-11
Payer: MEDICAID

## 2022-04-11 VITALS
WEIGHT: 175.2 LBS | HEART RATE: 71 BPM | BODY MASS INDEX: 34.22 KG/M2 | SYSTOLIC BLOOD PRESSURE: 128 MMHG | DIASTOLIC BLOOD PRESSURE: 79 MMHG

## 2022-04-11 DIAGNOSIS — E11.9 DIABETES MELLITUS TYPE 2, DIET-CONTROLLED (HCC): Primary | ICD-10-CM

## 2022-04-11 LAB — HBA1C MFR BLD HPLC: 6.1 %

## 2022-04-11 PROCEDURE — 83036 HEMOGLOBIN GLYCOSYLATED A1C: CPT | Performed by: PHARMACIST

## 2022-04-11 RX ORDER — METFORMIN HYDROCHLORIDE 500 MG/1
500 TABLET ORAL
Qty: 90 TABLET | Refills: 1 | Status: SHIPPED | OUTPATIENT
Start: 2022-04-11

## 2022-04-11 NOTE — PROGRESS NOTES
Pharmacy Progress Note - Diabetes Management    S/O: Ms. Paige England is a 28 y.o. female, referred by Dr. Chioma Jeronimo NP, with a PMH of T2DM, PCOS, GERD, was seen today for diabetes management.  Patient's last A1c was 6.4% (Oct 2021) which is increased from 6.2% (2021) which was improved from A1c 6.5% (2021).           Heekya  #[id number] utilized during visit. Interim update: Pt was last seen by this write nikia 10/4/21 for f/up on DM. No changes made to therapy at that time. Since then she has been seen by gynecologist for fertility concerns. She was started on cyclic provera and PNV. Letrazole was going to be considered for induction. Pt arrives to clinic today for f/up on DM. She has been walking a lot more and has had a 5 lbs weight reduction since last visit. She states she ran out of Metformin tabs and liquid last night. She has been taking it daily instead of BID. She requests a refill of med. Prefers tabs instead of liquid. Tolerates without GI side effects. Pt also request a refill of test strips. Pt is current fasting for . Reports BG between 90s-150s at any time of day. In office BG after meal of rice and potatoes was 178 mg/dL. Current anti-hyperglycemic regimen includes:    - Metformin 500 mg/5 mL BID - taking daily instead of BID    ROS:  Today, Pt endorses:  - Symptoms of Hyperglycemia: none  - Symptoms of Hypoglycemia: none    Self Monitoring Blood Glucose (SMBG) or CGM:  - Brought in home glucometer/blood glucose log/CGM reader today:  yes  - Checks BG: daily   - Bs-150s  - In office PPB mg/dL      Vitals:   Wt Readings from Last 3 Encounters:   22 180 lb (81.6 kg)   21 180 lb (81.6 kg)   21 180 lb 12.8 oz (82 kg)     BP Readings from Last 3 Encounters:   22 126/84   21 120/76   21 (!) 107/52     Pulse Readings from Last 3 Encounters:   21 71   10/04/21 74   21 75       Past Medical History:   Diagnosis Date    Anemia      No Known Allergies    Current Outpatient Medications   Medication Sig    meloxicam (MOBIC) 15 mg tablet Take 1 Tablet by mouth daily as needed for Pain.  glucose blood VI test strips (ASCENSIA AUTODISC VI, ONE TOUCH ULTRA TEST VI) strip Check blood glucose 4x daily ac TID and qhs (Patient not taking: Reported on 11/12/2021)    metFORMIN 500 mg/5 mL soln Take 500 mg by mouth two (2) times a day.  Blood-Glucose Meter monitoring kit Check blood glucose 4x daily ac TID and qhs (Patient not taking: Reported on 11/12/2021)    lancets misc Check blood glucose 4x daily ac TID and qhs (Patient not taking: Reported on 11/12/2021)    OMEPRAZOLE PO Take  by mouth daily.  acetaminophen (TYLENOL 8 HOUR PO) Take  by mouth. No current facility-administered medications for this visit. Lab Results   Component Value Date/Time    Sodium 139 10/04/2021 11:04 AM    Potassium 4.7 10/04/2021 11:04 AM    Chloride 106 10/04/2021 11:04 AM    CO2 21 10/04/2021 11:04 AM    Anion gap 7 02/26/2020 09:55 PM    Glucose 200 (H) 10/04/2021 11:04 AM    BUN 11 10/04/2021 11:04 AM    Creatinine 0.48 (L) 10/04/2021 11:04 AM    BUN/Creatinine ratio 23 10/04/2021 11:04 AM    GFR est  10/04/2021 11:04 AM    GFR est non- 10/04/2021 11:04 AM    Calcium 9.6 10/04/2021 11:04 AM    Bilirubin, total 0.4 10/04/2021 11:04 AM    Alk.  phosphatase 155 (H) 10/04/2021 11:04 AM    Protein, total 7.9 10/04/2021 11:04 AM    Albumin 4.5 10/04/2021 11:04 AM    Globulin 4.0 02/26/2020 09:55 PM    A-G Ratio 1.3 10/04/2021 11:04 AM    ALT (SGPT) 19 10/04/2021 11:04 AM     Lab Results   Component Value Date/Time    Cholesterol, total 127 10/04/2021 11:04 AM    HDL Cholesterol 27 (L) 10/04/2021 11:04 AM    LDL, calculated 48 10/04/2021 11:04 AM    VLDL, calculated 52 (H) 10/04/2021 11:04 AM    Triglyceride 341 (H) 10/04/2021 11:04 AM     Lab Results   Component Value Date/Time    WBC 7.1 09/11/2020 12:00 AM    HGB 10.4 (L) 09/11/2020 12:00 AM    HCT 33.7 (L) 09/11/2020 12:00 AM    PLATELET 805 36/12/8676 12:00 AM    MCV 75 (L) 09/11/2020 12:00 AM       Lab Results   Component Value Date/Time    Microalb/Creat ratio (ug/mg creat.) 8 10/04/2021 11:04 AM       Lab Results   Component Value Date/Time    Hemoglobin A1c 6.4 (H) 10/04/2021 11:04 AM    Hemoglobin A1c 6.2 (H) 07/06/2021 11:40 AM    Hemoglobin A1c 6.5 (H) 04/07/2021 12:00 AM     Hemoglobin A1c (POC)   Date Value Ref Range Status   09/07/2017 5.4 % Final        CrCl cannot be calculated (Patient's most recent lab result is older than the maximum 180 days allowed. ). A/P:    1) T2DM: Per ADA guidelines, Pt's A1c is at goal of < 6.5%. Current SMBG(s)/CGM trend is well controlled. Better control likely d/t weight reduction. Pt requested refills. Will provide tabs of Metformin.  - Continue Metformin 500 mg daily  - Refill for Metformin and test strips provided  - PoC A1c lab drawn today  - No f/up scheduled concerning DM. Encouraged pt reach out if she becomes pregnant and alternative therapies are required. 2) HTN: BP is at goal of < 130/80. Currently not on meds. 3) Primary Prevention ASCVD: Per ADA guidelines, pts age 43-69 yrs are recommended for statin therapy. Currently not taking meds. Pt is <40 yrs. LDL <100 mg/dL (10/4/21). Pt doing well. Medication reconciliation was completed during the visit. There are no discontinued medications. No orders of the defined types were placed in this encounter. Patient verbalized understanding of the information presented and all of the patients questions were answered. AVS was handed to the patient. Patient advised to call the office with any additional questions or concerns. Notifications of recommendations will be sent to Dr. Danette Tomas, NP for review.       Aspen Dempsey, PharmD, [facility]  Clinical Pharmacist Specialist          For Pharmacy Admin Tracking Only    Miguelito Verdugo CPA in place:  Yes   Recommendation Provided To: Patient/Caregiver: 3 via In person   Intervention Detail: Dose Adjustment: 1, reason: Therapy De-escalation, Lab(s) Ordered and Refill(s) Provided   Intervention Accepted By: Patient/Caregiver: 3   Time Spent (min): 60

## 2022-08-20 ENCOUNTER — APPOINTMENT (OUTPATIENT)
Dept: GENERAL RADIOLOGY | Age: 32
End: 2022-08-20
Attending: PHYSICIAN ASSISTANT
Payer: MEDICAID

## 2022-08-20 ENCOUNTER — HOSPITAL ENCOUNTER (EMERGENCY)
Age: 32
Discharge: HOME | End: 2022-08-21
Attending: EMERGENCY MEDICINE
Payer: MEDICAID

## 2022-08-20 VITALS
HEIGHT: 60 IN | RESPIRATION RATE: 16 BRPM | TEMPERATURE: 98.3 F | DIASTOLIC BLOOD PRESSURE: 71 MMHG | WEIGHT: 175 LBS | BODY MASS INDEX: 34.36 KG/M2 | OXYGEN SATURATION: 100 % | HEART RATE: 72 BPM | SYSTOLIC BLOOD PRESSURE: 127 MMHG

## 2022-08-20 DIAGNOSIS — M51.36 DEGENERATIVE DISC DISEASE, LUMBAR: ICD-10-CM

## 2022-08-20 DIAGNOSIS — S39.012A STRAIN OF LUMBAR REGION, INITIAL ENCOUNTER: Primary | ICD-10-CM

## 2022-08-20 LAB — HCG UR QL: NEGATIVE

## 2022-08-20 PROCEDURE — 74011000250 HC RX REV CODE- 250: Performed by: PHYSICIAN ASSISTANT

## 2022-08-20 PROCEDURE — 74011250636 HC RX REV CODE- 250/636: Performed by: PHYSICIAN ASSISTANT

## 2022-08-20 PROCEDURE — 96372 THER/PROPH/DIAG INJ SC/IM: CPT

## 2022-08-20 PROCEDURE — 74011250637 HC RX REV CODE- 250/637: Performed by: PHYSICIAN ASSISTANT

## 2022-08-20 PROCEDURE — 99284 EMERGENCY DEPT VISIT MOD MDM: CPT

## 2022-08-20 PROCEDURE — 81025 URINE PREGNANCY TEST: CPT

## 2022-08-20 PROCEDURE — 72100 X-RAY EXAM L-S SPINE 2/3 VWS: CPT

## 2022-08-20 RX ORDER — DIAZEPAM 5 MG/1
5 TABLET ORAL
Status: COMPLETED | OUTPATIENT
Start: 2022-08-20 | End: 2022-08-20

## 2022-08-20 RX ORDER — KETOROLAC TROMETHAMINE 30 MG/ML
15 INJECTION, SOLUTION INTRAMUSCULAR; INTRAVENOUS
Status: COMPLETED | OUTPATIENT
Start: 2022-08-20 | End: 2022-08-20

## 2022-08-20 RX ORDER — ACETAMINOPHEN 500 MG
1000 TABLET ORAL
Status: COMPLETED | OUTPATIENT
Start: 2022-08-20 | End: 2022-08-20

## 2022-08-20 RX ORDER — LIDOCAINE 4 G/100G
1 PATCH TOPICAL
Status: DISCONTINUED | OUTPATIENT
Start: 2022-08-20 | End: 2022-08-21 | Stop reason: HOSPADM

## 2022-08-20 RX ADMIN — ACETAMINOPHEN 1000 MG: 500 TABLET ORAL at 23:26

## 2022-08-20 RX ADMIN — KETOROLAC TROMETHAMINE 15 MG: 30 INJECTION, SOLUTION INTRAMUSCULAR at 23:28

## 2022-08-20 RX ADMIN — DIAZEPAM 5 MG: 5 TABLET ORAL at 23:26

## 2022-08-21 RX ORDER — NAPROXEN 500 MG/1
500 TABLET ORAL
Qty: 20 TABLET | Refills: 0 | Status: SHIPPED | OUTPATIENT
Start: 2022-08-21

## 2022-08-21 RX ORDER — LIDOCAINE 50 MG/G
PATCH TOPICAL
Qty: 15 EACH | Refills: 0 | Status: SHIPPED | OUTPATIENT
Start: 2022-08-21

## 2022-08-21 RX ORDER — ACETAMINOPHEN 500 MG
1000 TABLET ORAL
Qty: 20 TABLET | Refills: 0 | Status: SHIPPED | OUTPATIENT
Start: 2022-08-21

## 2022-08-21 RX ORDER — CYCLOBENZAPRINE HCL 10 MG
10 TABLET ORAL
Qty: 15 TABLET | Refills: 0 | Status: SHIPPED | OUTPATIENT
Start: 2022-08-21

## 2022-08-21 NOTE — ED NOTES
Emergency Department Nursing Plan of Care       The Nursing Plan of Care is developed from the Nursing assessment and Emergency Department Attending provider initial evaluation. The plan of care may be reviewed in the ED Provider note.     The Plan of Care was developed with the following considerations:   Patient / Family readiness to learn indicated by:verbalized understanding  Persons(s) to be included in education: patient  Barriers to Learning/Limitations:No    Signed     Herbert Pascual RN    8/20/2022   11:12 PM

## 2022-08-21 NOTE — ED TRIAGE NOTES
Patient to ED for lower back pain since 5pm today after bending down to  a pack of water. Patient ambulatory to triage but doubled over with pain.

## 2022-08-21 NOTE — ED PROVIDER NOTES
EMERGENCY DEPARTMENT HISTORY AND PHYSICAL EXAM      Date: 8/20/2022  Patient Name: Mary Lau    History of Presenting Illness     Chief Complaint   Patient presents with    Back Pain     History Provided By: Patient and family member    HPI: Mary Lau, 28 y.o. female with medical hx significant for anemia who presents via self to the ED with cc of acute moderate aching/ tight bilateral low back pain x 1 day secondary to bending over to lift heavy pack of water and feeling acute pain in back. Rest without relief. No medications pta. No fever, chills, n/v, numbness/tingling, focal weakness, incontinence, urinary retention, abd pain, cp, sob, lightheadedness, dizziness. PCP: Pao Scales, NP    There are no other complaints, changes, or physical findings at this time. No current facility-administered medications on file prior to encounter. Current Outpatient Medications on File Prior to Encounter   Medication Sig Dispense Refill    glucose blood VI test strips (ASCENSIA AUTODISC VI, ONE TOUCH ULTRA TEST VI) strip Check blood glucose 4x daily ac TID and qhs 100 Strip 5    metFORMIN (GLUCOPHAGE) 500 mg tablet Take 1 Tablet by mouth daily (with dinner). 90 Tablet 1    meloxicam (MOBIC) 15 mg tablet Take 1 Tablet by mouth daily as needed for Pain. 30 Tablet 1    Blood-Glucose Meter monitoring kit Check blood glucose 4x daily ac TID and qhs (Patient not taking: Reported on 11/12/2021) 1 Kit 0    lancets misc Check blood glucose 4x daily ac TID and qhs (Patient not taking: Reported on 11/12/2021) 100 Each 11    OMEPRAZOLE PO Take  by mouth daily. [DISCONTINUED] acetaminophen (TYLENOL 8 HOUR PO) Take  by mouth.        Past History     Past Medical History:  Past Medical History:   Diagnosis Date    Anemia      Past Surgical History:  Past Surgical History:   Procedure Laterality Date    HX APPENDECTOMY       Family History:  Family History   Problem Relation Age of Onset    No Known Problems Mother     No Known Problems Father      Social History:  Social History     Tobacco Use    Smoking status: Never    Smokeless tobacco: Never   Substance Use Topics    Alcohol use: No     Alcohol/week: 0.0 standard drinks    Drug use: No     Allergies:  No Known Allergies  Review of Systems   Review of Systems   Constitutional:  Negative for activity change, appetite change, chills, fatigue and fever. HENT: Negative. Eyes: Negative. Respiratory:  Negative for cough and shortness of breath. Cardiovascular: Negative. Negative for chest pain. Gastrointestinal: Negative. Negative for abdominal pain, constipation, diarrhea, nausea and vomiting. Genitourinary: Negative. Negative for difficulty urinating, dysuria, flank pain, frequency and pelvic pain. Musculoskeletal:  Positive for back pain. Negative for arthralgias, gait problem, joint swelling, myalgias and neck pain. Skin: Negative. Negative for rash and wound. Neurological: Negative. Negative for weakness, light-headedness, numbness and headaches. Psychiatric/Behavioral: Negative. Physical Exam   Physical Exam  Vitals and nursing note reviewed. Constitutional:       General: She is not in acute distress. Appearance: Normal appearance. She is well-developed. She is not ill-appearing, toxic-appearing or diaphoretic. HENT:      Head: Normocephalic and atraumatic. Right Ear: Hearing and external ear normal.      Left Ear: Hearing and external ear normal.      Nose: Nose normal.   Eyes:      Conjunctiva/sclera: Conjunctivae normal.      Pupils: Pupils are equal, round, and reactive to light. Cardiovascular:      Rate and Rhythm: Normal rate and regular rhythm. Pulses: Normal pulses. Femoral pulses are 2+ on the right side and 2+ on the left side. Posterior tibial pulses are 2+ on the right side and 2+ on the left side. Heart sounds: Normal heart sounds.    Pulmonary:      Effort: Pulmonary effort is normal. No respiratory distress. Breath sounds: Normal breath sounds. Musculoskeletal:         General: Normal range of motion. Cervical back: Normal and normal range of motion. Thoracic back: Normal.      Lumbar back: Spasms, tenderness and bony tenderness present. No swelling, edema, deformity, signs of trauma or lacerations. Normal range of motion. Negative right straight leg raise test and negative left straight leg raise test. No scoliosis. Comments: Moderate reproducible TTP along bilateral lumbar paravertebral mm. No crepitus, deformity, step off, edema, instability, erythema, rash, wound. NVI to BLE. Skin:     General: Skin is warm and dry. Neurological:      Mental Status: She is alert and oriented to person, place, and time. Comments: Normal: Cremasteric reflex, thigh adduction, knee extension, ankle dorsiflexion, toe plantar flexion, knee reflex, knee flexion. Psychiatric:         Behavior: Behavior normal.         Thought Content: Thought content normal.         Judgment: Judgment normal.     Diagnostic Study Results   Labs -     Recent Results (from the past 12 hour(s))   HCG URINE, QL. - POC    Collection Time: 08/20/22 11:38 PM   Result Value Ref Range    Pregnancy test,urine (POC) Negative NEG         Radiologic Studies -   XR SPINE LUMB 2 OR 3 V   Final Result   Minimal degenerative findings. XR SPINE LUMB 2 OR 3 V    Result Date: 8/21/2022  Minimal degenerative findings. Medical Decision Making   I am the first provider for this patient. I reviewed the vital signs, available nursing notes, past medical history, past surgical history, family history and social history. Vital Signs-Reviewed the patient's vital signs.   Patient Vitals for the past 24 hrs:   Temp Pulse Resp BP SpO2   08/20/22 2304 98.3 °F (36.8 °C) 72 16 127/71 100 %     Pulse Oximetry Analysis - 100% on RA (normal)    Records Reviewed: Nursing Notes, Old Medical Records, Previous Radiology Studies, and Previous Laboratory Studies    Provider Notes (Medical Decision Making): The patient complains of low back pain. These symptoms are consistent with a lumbar strain. Less likely fx, subluxation,  pathology, aortic dissection or ruptured AAA, or cauda equina given that there are no red flags and a benign physical exam.     I have recommended rest, avoiding heavy lifting until better, use of intermittent heat (avoid sleeping on a heating pad), and use of OTC NSAID's (Advil, Aleve etc) or Tylenol prn for pain. Call PCP if back pain persists or she develops leg symptoms. It has also been explained that this may take up to three months to fully resolved and that smoking may slow that process even more. ED Course:   Initial assessment performed. The patients presenting problems have been discussed, and they are in agreement with the care plan formulated and outlined with them. I have encouraged them to ask questions as they arise throughout their visit. ED Course as of 08/21/22 0019   Sat Aug 20, 2022   2316 Pt hunched over, but ambulatory to restroom. [SM]   Sun Aug 21, 2022   0008 Pt resting comfortably in room in NAD. No new symptoms or complaints at this time. Available labs/ results reviewed with pt.   [SM]      ED Course User Index  [SM] Lakisha Loza PA-C       Progress Note:   Updated pt on all returned results and findings. Discussed the importance of proper follow up as referred below along with return precautions. Pt in agreement with the care plan and expresses agreement with and understanding of all items discussed. Disposition:  12:19 AM  I have discussed with patient their diagnosis, treatment, and follow up plan. The patient agrees to follow up as outlined in discharge paperwork and also to return to the ED with any worsening. Paige Sparks PA-C      PLAN:  1.    Current Discharge Medication List        START taking these medications    Details naproxen (NAPROSYN) 500 mg tablet Take 1 Tablet by mouth two (2) times daily as needed for Pain. Qty: 20 Tablet, Refills: 0  Start date: 2022      acetaminophen (TYLENOL) 500 mg tablet Take 2 Tablets by mouth every six (6) hours as needed for Pain. Qty: 20 Tablet, Refills: 0  Start date: 2022      lidocaine (LIDODERM) 5 % Apply patch to the affected area for 12 hours a day and remove for 12 hours a day. Qty: 15 Each, Refills: 0  Start date: 2022      cyclobenzaprine (FLEXERIL) 10 mg tablet Take 1 Tablet by mouth three (3) times daily as needed for Muscle Spasm(s). Qty: 15 Tablet, Refills: 0  Start date: 2022           STOP taking these medications       acetaminophen (TYLENOL 8 HOUR PO) Comments:   Reason for Stoppin.   Follow-up Information       Follow up With Specialties Details Why Contact Info    Yas Scales NP Nurse Practitioner Schedule an appointment as soon as possible for a visit  As needed [address]  [phone]      [facility]  Schedule an appointment as soon as possible for a visit  As needed [address] [facility] [address] [facility] [id number] [place] [address] - [place] EMERGENCY DEPT Emergency Medicine Go to  As needed, If symptoms worsen [address] [facility]  [phone]          Return to ED if worse     Diagnosis     Clinical Impression:   1. Strain of lumbar region, initial encounter    2. Degenerative disc disease, lumbar            Please note that this dictation was completed with Dragon, computer voice recognition software. Quite often unanticipated grammatical, syntax, homophones, and other interpretive errors are inadvertently transcribed by the computer software. Please disregard these errors. Additionally, please excuse any errors that have escaped final proofreading.

## 2022-08-21 NOTE — DISCHARGE INSTRUCTIONS
It was a pleasure taking care of you at [facility] Emergency Department today. We know that when you come to [facility], you are entrusting us with your health, comfort, and safety. Our physicians and nurses honor that trust, and we truly appreciate the opportunity to care for you and your loved ones. We also value our feedback. If you receive a survey about your Emergency Department experience today, please fill it out. We care about our patients' feedback, and we listen to what you have to say. Thank you!

## 2022-08-21 NOTE — ED NOTES
Discharge instructions were given to the patient by MD.     The patient left the Emergency Department ambulatory, alert and oriented and in no acute distress with  prescriptions. The patient was encouraged to call or return to the ED for worsening issues or problems and was encouraged to schedule a follow up appointment for continuing care. The patient verbalized understanding of discharge instructions and prescriptions, all questions were answered. The patient has no further concerns at this time.

## 2023-05-23 RX ORDER — LIDOCAINE 50 MG/G
PATCH TOPICAL
COMMUNITY
Start: 2022-08-21 | End: 2023-06-23

## 2023-05-23 RX ORDER — ACETAMINOPHEN 500 MG
1000 TABLET ORAL EVERY 6 HOURS PRN
COMMUNITY
Start: 2022-08-21

## 2023-05-23 RX ORDER — MELOXICAM 15 MG/1
15 TABLET ORAL DAILY PRN
COMMUNITY
Start: 2021-11-09 | End: 2023-06-23

## 2023-05-23 RX ORDER — LANCETS 30 GAUGE
EACH MISCELLANEOUS
COMMUNITY
Start: 2021-04-27

## 2023-05-23 RX ORDER — NAPROXEN 500 MG/1
500 TABLET ORAL 2 TIMES DAILY PRN
COMMUNITY
Start: 2022-08-21 | End: 2023-06-23

## 2023-05-23 RX ORDER — CYCLOBENZAPRINE HCL 10 MG
10 TABLET ORAL 3 TIMES DAILY PRN
COMMUNITY
Start: 2022-08-21 | End: 2023-06-23

## 2023-06-23 ENCOUNTER — OFFICE VISIT (OUTPATIENT)
Age: 33
End: 2023-06-23
Payer: MEDICAID

## 2023-06-23 VITALS
TEMPERATURE: 98 F | WEIGHT: 176.8 LBS | DIASTOLIC BLOOD PRESSURE: 74 MMHG | SYSTOLIC BLOOD PRESSURE: 132 MMHG | RESPIRATION RATE: 16 BRPM | HEIGHT: 60 IN | OXYGEN SATURATION: 99 % | BODY MASS INDEX: 34.71 KG/M2 | HEART RATE: 86 BPM

## 2023-06-23 DIAGNOSIS — E11.42 TYPE 2 DIABETES MELLITUS WITH DIABETIC POLYNEUROPATHY, WITHOUT LONG-TERM CURRENT USE OF INSULIN (HCC): ICD-10-CM

## 2023-06-23 DIAGNOSIS — E11.42 TYPE 2 DIABETES MELLITUS WITH DIABETIC POLYNEUROPATHY, WITHOUT LONG-TERM CURRENT USE OF INSULIN (HCC): Primary | ICD-10-CM

## 2023-06-23 DIAGNOSIS — E66.9 OBESITY (BMI 30.0-34.9): ICD-10-CM

## 2023-06-23 DIAGNOSIS — K21.9 GASTROESOPHAGEAL REFLUX DISEASE WITHOUT ESOPHAGITIS: ICD-10-CM

## 2023-06-23 DIAGNOSIS — Z12.4 CERVICAL CANCER SCREENING: ICD-10-CM

## 2023-06-23 DIAGNOSIS — M47.816 ARTHRITIS OF LUMBAR SPINE: ICD-10-CM

## 2023-06-23 PROBLEM — E66.811 OBESITY (BMI 30.0-34.9): Status: ACTIVE | Noted: 2023-06-23

## 2023-06-23 PROCEDURE — 99204 OFFICE O/P NEW MOD 45 MIN: CPT | Performed by: INTERNAL MEDICINE

## 2023-06-23 RX ORDER — OMEPRAZOLE 20 MG/1
20 CAPSULE, DELAYED RELEASE ORAL DAILY
Qty: 90 CAPSULE | Refills: 1 | Status: SHIPPED | OUTPATIENT
Start: 2023-06-23

## 2023-06-23 SDOH — ECONOMIC STABILITY: FOOD INSECURITY: WITHIN THE PAST 12 MONTHS, YOU WORRIED THAT YOUR FOOD WOULD RUN OUT BEFORE YOU GOT MONEY TO BUY MORE.: NEVER TRUE

## 2023-06-23 SDOH — ECONOMIC STABILITY: HOUSING INSECURITY
IN THE LAST 12 MONTHS, WAS THERE A TIME WHEN YOU DID NOT HAVE A STEADY PLACE TO SLEEP OR SLEPT IN A SHELTER (INCLUDING NOW)?: NO

## 2023-06-23 SDOH — ECONOMIC STABILITY: INCOME INSECURITY: HOW HARD IS IT FOR YOU TO PAY FOR THE VERY BASICS LIKE FOOD, HOUSING, MEDICAL CARE, AND HEATING?: NOT VERY HARD

## 2023-06-23 SDOH — ECONOMIC STABILITY: FOOD INSECURITY: WITHIN THE PAST 12 MONTHS, THE FOOD YOU BOUGHT JUST DIDN'T LAST AND YOU DIDN'T HAVE MONEY TO GET MORE.: NEVER TRUE

## 2023-06-23 ASSESSMENT — ANXIETY QUESTIONNAIRES
5. BEING SO RESTLESS THAT IT IS HARD TO SIT STILL: 0
1. FEELING NERVOUS, ANXIOUS, OR ON EDGE: 0
IF YOU CHECKED OFF ANY PROBLEMS ON THIS QUESTIONNAIRE, HOW DIFFICULT HAVE THESE PROBLEMS MADE IT FOR YOU TO DO YOUR WORK, TAKE CARE OF THINGS AT HOME, OR GET ALONG WITH OTHER PEOPLE: NOT DIFFICULT AT ALL
GAD7 TOTAL SCORE: 0
4. TROUBLE RELAXING: 0
3. WORRYING TOO MUCH ABOUT DIFFERENT THINGS: 0
2. NOT BEING ABLE TO STOP OR CONTROL WORRYING: 0
6. BECOMING EASILY ANNOYED OR IRRITABLE: 0
7. FEELING AFRAID AS IF SOMETHING AWFUL MIGHT HAPPEN: 0

## 2023-06-23 ASSESSMENT — PATIENT HEALTH QUESTIONNAIRE - PHQ9
SUM OF ALL RESPONSES TO PHQ QUESTIONS 1-9: 0
1. LITTLE INTEREST OR PLEASURE IN DOING THINGS: 0
2. FEELING DOWN, DEPRESSED OR HOPELESS: 0
SUM OF ALL RESPONSES TO PHQ QUESTIONS 1-9: 0
SUM OF ALL RESPONSES TO PHQ QUESTIONS 1-9: 0
SUM OF ALL RESPONSES TO PHQ9 QUESTIONS 1 & 2: 0
SUM OF ALL RESPONSES TO PHQ QUESTIONS 1-9: 0

## 2023-06-23 ASSESSMENT — ENCOUNTER SYMPTOMS
RESPIRATORY NEGATIVE: 1
SHORTNESS OF BREATH: 0
GASTROINTESTINAL NEGATIVE: 1
EYES NEGATIVE: 1
BACK PAIN: 1
ABDOMINAL PAIN: 0
ALLERGIC/IMMUNOLOGIC NEGATIVE: 1

## 2023-06-23 NOTE — PROGRESS NOTES
1. \"Have you been to the ER, urgent care clinic since your last visit? Hospitalized since your last visit? \" No    2. \"Have you seen or consulted any other health care providers outside of the [address] [place] [address] since your last visit? \" No    3. For patients aged 39-70: Has the patient had a colonoscopy / FIT/ Cologuard? Recommendation: Colonoscopy every 10y or annual FIT test from 50-75 or every 3 year stool DNA based test with consideration of ongoing screening from 76-85. If the patient is female:    4. For patients aged 41-77: Has the patient had a mammogram within the past 2 years? No    5. For patients aged 21-65: Has the patient had a pap smear?  No

## 2023-06-23 NOTE — PROGRESS NOTES
Donis Sellers is a 35 y.o. female who presents today for the following:  Chief Complaint   Patient presents with    New Patient    Back Pain    Obesity    Gastroesophageal Reflux         New patient comes in to establish care. Has a few chronic medical issues as documented. Has diabetes with some neuropathy. On metformin. Reports sugars being okay. Last A1c was under 7. Needs labs and refills. No vision issues. Has chronic GERD. Omeprazole helps. Takes it as needed. Certain food makes it worse. Has chronic neck, upper and lower back pain. Previous x-ray showed arthritis. Also reports some arthralgias. Has been on NSAIDs before but currently taking Tylenol as needed. Denies any trauma. She is . Has a daughter. She is a housewife. No other acute complaints. Has family history of diabetes. Has had Covid-19 vaccination. Reports taking proper precautions. Denies any related signs or symptoms. PMH/PSH/Allergies/Social History/medication list and most recent studies reviewed with patient. Social History     Tobacco Use   Smoking Status Never   Smokeless Tobacco Never     Social History     Substance and Sexual Activity   Alcohol Use No    Alcohol/week: 0.0 standard drinks          Reports compliance with medications and diet. Trying to be active physically to control weight. Reports no other new c/o. Past Medical History:   Diagnosis Date    Anemia        No Known Allergies     Current Outpatient Medications on File Prior to Visit   Medication Sig Dispense Refill    Lancets MISC Check blood glucose 4x daily ac TID and qhs      acetaminophen (TYLENOL) 500 MG tablet Take 2 tablets by mouth every 6 hours as needed       No current facility-administered medications on file prior to visit.        /74 (Site: Left Upper Arm, Position: Sitting, Cuff Size: Large Adult)   Pulse 86   Temp 98 °F (36.7 °C) (Oral)   Resp 16   Ht 5' (1.524 m)   Wt 176 lb 12.8 oz

## 2023-06-24 LAB
25(OH)D3+25(OH)D2 SERPL-MCNC: 8.8 NG/ML (ref 30–100)
ALBUMIN SERPL-MCNC: 4.6 G/DL (ref 3.8–4.8)
ALBUMIN/GLOB SERPL: 1.4 {RATIO} (ref 1.2–2.2)
ALP SERPL-CCNC: 105 IU/L (ref 44–121)
ALT SERPL-CCNC: 17 IU/L (ref 0–32)
AST SERPL-CCNC: 16 IU/L (ref 0–40)
BILIRUB SERPL-MCNC: 0.7 MG/DL (ref 0–1.2)
BUN SERPL-MCNC: 8 MG/DL (ref 6–20)
BUN/CREAT SERPL: 14 (ref 9–23)
CALCIUM SERPL-MCNC: 9.5 MG/DL (ref 8.7–10.2)
CHLORIDE SERPL-SCNC: 103 MMOL/L (ref 96–106)
CHOLEST SERPL-MCNC: 117 MG/DL (ref 100–199)
CO2 SERPL-SCNC: 20 MMOL/L (ref 20–29)
CREAT SERPL-MCNC: 0.56 MG/DL (ref 0.57–1)
EGFRCR SERPLBLD CKD-EPI 2021: 124 ML/MIN/1.73
ERYTHROCYTE [DISTWIDTH] IN BLOOD BY AUTOMATED COUNT: 18.1 % (ref 11.7–15.4)
GLOBULIN SER CALC-MCNC: 3.2 G/DL (ref 1.5–4.5)
GLUCOSE SERPL-MCNC: 141 MG/DL (ref 70–99)
HBA1C MFR BLD: 5.9 % (ref 4.8–5.6)
HCT VFR BLD AUTO: 26.1 % (ref 34–46.6)
HDLC SERPL-MCNC: 25 MG/DL
HGB BLD-MCNC: 7.3 G/DL (ref 11.1–15.9)
IMP & REVIEW OF LAB RESULTS: NORMAL
LDLC SERPL CALC-MCNC: 40 MG/DL (ref 0–99)
MCH RBC QN AUTO: 18 PG (ref 26.6–33)
MCHC RBC AUTO-ENTMCNC: 28 G/DL (ref 31.5–35.7)
MCV RBC AUTO: 64 FL (ref 79–97)
PLATELET # BLD AUTO: 423 X10E3/UL (ref 150–450)
POTASSIUM SERPL-SCNC: 4.6 MMOL/L (ref 3.5–5.2)
PROT SERPL-MCNC: 7.8 G/DL (ref 6–8.5)
RBC # BLD AUTO: 4.06 X10E6/UL (ref 3.77–5.28)
SODIUM SERPL-SCNC: 139 MMOL/L (ref 134–144)
TRIGL SERPL-MCNC: 353 MG/DL (ref 0–149)
TSH SERPL DL<=0.005 MIU/L-ACNC: 0.65 UIU/ML (ref 0.45–4.5)
VLDLC SERPL CALC-MCNC: 52 MG/DL (ref 5–40)
WBC # BLD AUTO: 7 X10E3/UL (ref 3.4–10.8)

## 2023-07-06 RX ORDER — ERGOCALCIFEROL 1.25 MG/1
50000 CAPSULE ORAL WEEKLY
Qty: 12 CAPSULE | Refills: 0 | Status: ON HOLD | OUTPATIENT
Start: 2023-07-06

## 2023-07-06 RX ORDER — FENOFIBRATE 145 MG/1
145 TABLET, COATED ORAL DAILY
Qty: 30 TABLET | Refills: 3 | Status: ON HOLD | OUTPATIENT
Start: 2023-07-06

## 2023-07-06 RX ORDER — FERROUS SULFATE 137(45) MG
142 TABLET, EXTENDED RELEASE ORAL DAILY
Qty: 30 TABLET | Refills: 5 | Status: ON HOLD | OUTPATIENT
Start: 2023-07-06

## 2023-07-08 ENCOUNTER — APPOINTMENT (OUTPATIENT)
Facility: HOSPITAL | Age: 33
End: 2023-07-08
Payer: MEDICAID

## 2023-07-08 ENCOUNTER — HOSPITAL ENCOUNTER (EMERGENCY)
Facility: HOSPITAL | Age: 33
Discharge: ANOTHER HOSPITAL | End: 2023-07-09
Attending: EMERGENCY MEDICINE
Payer: MEDICAID

## 2023-07-08 DIAGNOSIS — D50.8 OTHER IRON DEFICIENCY ANEMIA: ICD-10-CM

## 2023-07-08 DIAGNOSIS — R10.13 ABDOMINAL PAIN, EPIGASTRIC: Primary | ICD-10-CM

## 2023-07-08 LAB
ALBUMIN SERPL-MCNC: 3.7 G/DL (ref 3.5–5)
ALBUMIN/GLOB SERPL: 1 (ref 1.1–2.2)
ALP SERPL-CCNC: 107 U/L (ref 45–117)
ALT SERPL-CCNC: 22 U/L (ref 12–78)
ANION GAP SERPL CALC-SCNC: 7 MMOL/L (ref 5–15)
APPEARANCE UR: CLEAR
AST SERPL-CCNC: 13 U/L (ref 15–37)
BACTERIA URNS QL MICRO: NEGATIVE /HPF
BASOPHILS # BLD: 0.1 K/UL (ref 0–0.1)
BASOPHILS NFR BLD: 1 % (ref 0–1)
BILIRUB SERPL-MCNC: 0.4 MG/DL (ref 0.2–1)
BILIRUB UR QL: NEGATIVE
BUN SERPL-MCNC: 7 MG/DL (ref 6–20)
BUN/CREAT SERPL: 12 (ref 12–20)
CALCIUM SERPL-MCNC: 8.4 MG/DL (ref 8.5–10.1)
CHLORIDE SERPL-SCNC: 108 MMOL/L (ref 97–108)
CO2 SERPL-SCNC: 25 MMOL/L (ref 21–32)
COLOR UR: ABNORMAL
CREAT SERPL-MCNC: 0.58 MG/DL (ref 0.55–1.02)
DIFFERENTIAL METHOD BLD: ABNORMAL
EOSINOPHIL # BLD: 0.3 K/UL (ref 0–0.4)
EOSINOPHIL NFR BLD: 3 % (ref 0–7)
EPITH CASTS URNS QL MICRO: ABNORMAL /LPF
ERYTHROCYTE [DISTWIDTH] IN BLOOD BY AUTOMATED COUNT: 18.8 % (ref 11.5–14.5)
GLOBULIN SER CALC-MCNC: 3.8 G/DL (ref 2–4)
GLUCOSE SERPL-MCNC: 107 MG/DL (ref 65–100)
GLUCOSE UR STRIP.AUTO-MCNC: NEGATIVE MG/DL
HCG UR QL: NEGATIVE
HCT VFR BLD AUTO: 24.9 % (ref 35–47)
HEMOCCULT STL QL: NEGATIVE
HGB BLD-MCNC: 6.8 G/DL (ref 11.5–16)
HGB UR QL STRIP: ABNORMAL
HISTORY CHECK: NORMAL
IMM GRANULOCYTES # BLD AUTO: 0.1 K/UL (ref 0–0.04)
IMM GRANULOCYTES NFR BLD AUTO: 1 % (ref 0–0.5)
INR PPP: 0.9 (ref 0.9–1.1)
KETONES UR QL STRIP.AUTO: NEGATIVE MG/DL
LEUKOCYTE ESTERASE UR QL STRIP.AUTO: ABNORMAL
LIPASE SERPL-CCNC: 62 U/L (ref 73–393)
LYMPHOCYTES # BLD: 2.9 K/UL (ref 0.8–3.5)
LYMPHOCYTES NFR BLD: 35 % (ref 12–49)
MCH RBC QN AUTO: 17.8 PG (ref 26–34)
MCHC RBC AUTO-ENTMCNC: 27.3 G/DL (ref 30–36.5)
MCV RBC AUTO: 65 FL (ref 80–99)
MONOCYTES # BLD: 0.6 K/UL (ref 0–1)
MONOCYTES NFR BLD: 7 % (ref 5–13)
NEUTS SEG # BLD: 4.3 K/UL (ref 1.8–8)
NEUTS SEG NFR BLD: 53 % (ref 32–75)
NITRITE UR QL STRIP.AUTO: NEGATIVE
NRBC # BLD: 0 K/UL (ref 0–0.01)
NRBC BLD-RTO: 0 PER 100 WBC
PH UR STRIP: 5.5 (ref 5–8)
PLATELET # BLD AUTO: 420 K/UL (ref 150–400)
PMV BLD AUTO: 10.2 FL (ref 8.9–12.9)
POTASSIUM SERPL-SCNC: 3.8 MMOL/L (ref 3.5–5.1)
PROT SERPL-MCNC: 7.5 G/DL (ref 6.4–8.2)
PROT UR STRIP-MCNC: NEGATIVE MG/DL
PROTHROMBIN TIME: 9.5 SEC (ref 9–11.1)
RBC # BLD AUTO: 3.83 M/UL (ref 3.8–5.2)
RBC #/AREA URNS HPF: ABNORMAL /HPF (ref 0–5)
RBC MORPH BLD: ABNORMAL
SODIUM SERPL-SCNC: 140 MMOL/L (ref 136–145)
SP GR UR REFRACTOMETRY: 1.01
URINE CULTURE IF INDICATED: ABNORMAL
UROBILINOGEN UR QL STRIP.AUTO: 0.2 EU/DL (ref 0.2–1)
WBC # BLD AUTO: 8.3 K/UL (ref 3.6–11)
WBC URNS QL MICRO: ABNORMAL /HPF (ref 0–4)

## 2023-07-08 PROCEDURE — 86923 COMPATIBILITY TEST ELECTRIC: CPT

## 2023-07-08 PROCEDURE — 85610 PROTHROMBIN TIME: CPT

## 2023-07-08 PROCEDURE — 2580000003 HC RX 258: Performed by: EMERGENCY MEDICINE

## 2023-07-08 PROCEDURE — 80053 COMPREHEN METABOLIC PANEL: CPT

## 2023-07-08 PROCEDURE — 81001 URINALYSIS AUTO W/SCOPE: CPT

## 2023-07-08 PROCEDURE — 86900 BLOOD TYPING SEROLOGIC ABO: CPT

## 2023-07-08 PROCEDURE — 83690 ASSAY OF LIPASE: CPT

## 2023-07-08 PROCEDURE — 99285 EMERGENCY DEPT VISIT HI MDM: CPT

## 2023-07-08 PROCEDURE — 6360000002 HC RX W HCPCS: Performed by: EMERGENCY MEDICINE

## 2023-07-08 PROCEDURE — 36415 COLL VENOUS BLD VENIPUNCTURE: CPT

## 2023-07-08 PROCEDURE — 86850 RBC ANTIBODY SCREEN: CPT

## 2023-07-08 PROCEDURE — 6360000002 HC RX W HCPCS: Performed by: NURSE PRACTITIONER

## 2023-07-08 PROCEDURE — A4216 STERILE WATER/SALINE, 10 ML: HCPCS | Performed by: EMERGENCY MEDICINE

## 2023-07-08 PROCEDURE — 81025 URINE PREGNANCY TEST: CPT

## 2023-07-08 PROCEDURE — 82272 OCCULT BLD FECES 1-3 TESTS: CPT

## 2023-07-08 PROCEDURE — C9113 INJ PANTOPRAZOLE SODIUM, VIA: HCPCS | Performed by: EMERGENCY MEDICINE

## 2023-07-08 PROCEDURE — 74176 CT ABD & PELVIS W/O CONTRAST: CPT

## 2023-07-08 PROCEDURE — 96374 THER/PROPH/DIAG INJ IV PUSH: CPT

## 2023-07-08 PROCEDURE — 86901 BLOOD TYPING SEROLOGIC RH(D): CPT

## 2023-07-08 PROCEDURE — 96375 TX/PRO/DX INJ NEW DRUG ADDON: CPT

## 2023-07-08 PROCEDURE — 85025 COMPLETE CBC W/AUTO DIFF WBC: CPT

## 2023-07-08 RX ORDER — SODIUM CHLORIDE 9 MG/ML
INJECTION, SOLUTION INTRAVENOUS PRN
Status: DISCONTINUED | OUTPATIENT
Start: 2023-07-08 | End: 2023-07-09 | Stop reason: HOSPADM

## 2023-07-08 RX ORDER — ONDANSETRON 2 MG/ML
4 INJECTION INTRAMUSCULAR; INTRAVENOUS ONCE
Status: COMPLETED | OUTPATIENT
Start: 2023-07-08 | End: 2023-07-08

## 2023-07-08 RX ORDER — KETOROLAC TROMETHAMINE 30 MG/ML
30 INJECTION, SOLUTION INTRAMUSCULAR; INTRAVENOUS ONCE
Status: COMPLETED | OUTPATIENT
Start: 2023-07-08 | End: 2023-07-08

## 2023-07-08 RX ADMIN — KETOROLAC TROMETHAMINE 30 MG: 30 INJECTION, SOLUTION INTRAMUSCULAR; INTRAVENOUS at 20:13

## 2023-07-08 RX ADMIN — ONDANSETRON 4 MG: 2 INJECTION INTRAMUSCULAR; INTRAVENOUS at 21:26

## 2023-07-08 RX ADMIN — SODIUM CHLORIDE 40 MG: 9 INJECTION INTRAMUSCULAR; INTRAVENOUS; SUBCUTANEOUS at 21:44

## 2023-07-08 ASSESSMENT — LIFESTYLE VARIABLES
HOW MANY STANDARD DRINKS CONTAINING ALCOHOL DO YOU HAVE ON A TYPICAL DAY: PATIENT DOES NOT DRINK
HOW OFTEN DO YOU HAVE A DRINK CONTAINING ALCOHOL: NEVER

## 2023-07-08 ASSESSMENT — PAIN DESCRIPTION - PAIN TYPE: TYPE: ACUTE PAIN

## 2023-07-08 ASSESSMENT — PAIN SCALES - GENERAL: PAINLEVEL_OUTOF10: 8

## 2023-07-08 ASSESSMENT — ENCOUNTER SYMPTOMS
BACK PAIN: 0
SHORTNESS OF BREATH: 0
NAUSEA: 1
ABDOMINAL PAIN: 1
DIARRHEA: 0

## 2023-07-08 ASSESSMENT — PAIN DESCRIPTION - ORIENTATION: ORIENTATION: UPPER

## 2023-07-08 ASSESSMENT — PAIN DESCRIPTION - LOCATION: LOCATION: ABDOMEN;HEAD

## 2023-07-08 ASSESSMENT — PAIN - FUNCTIONAL ASSESSMENT: PAIN_FUNCTIONAL_ASSESSMENT: 0-10

## 2023-07-09 ENCOUNTER — HOSPITAL ENCOUNTER (OUTPATIENT)
Facility: HOSPITAL | Age: 33
Setting detail: OBSERVATION
Discharge: HOME | End: 2023-07-10
Attending: STUDENT IN AN ORGANIZED HEALTH CARE EDUCATION/TRAINING PROGRAM | Admitting: STUDENT IN AN ORGANIZED HEALTH CARE EDUCATION/TRAINING PROGRAM
Payer: MEDICAID

## 2023-07-09 VITALS
RESPIRATION RATE: 16 BRPM | OXYGEN SATURATION: 100 % | WEIGHT: 164 LBS | TEMPERATURE: 97.8 F | HEIGHT: 60 IN | SYSTOLIC BLOOD PRESSURE: 106 MMHG | DIASTOLIC BLOOD PRESSURE: 56 MMHG | BODY MASS INDEX: 32.2 KG/M2 | HEART RATE: 71 BPM

## 2023-07-09 PROBLEM — D64.9 ANEMIA: Status: ACTIVE | Noted: 2023-07-09

## 2023-07-09 LAB
COMMENT:: NORMAL
ERYTHROCYTE [DISTWIDTH] IN BLOOD BY AUTOMATED COUNT: 20.4 % (ref 11.5–14.5)
EST. AVERAGE GLUCOSE BLD GHB EST-MCNC: 103 MG/DL
FERRITIN SERPL-MCNC: 3 NG/ML (ref 26–388)
FOLATE SERPL-MCNC: 17 NG/ML (ref 5–21)
GLUCOSE BLD STRIP.AUTO-MCNC: 118 MG/DL (ref 65–117)
GLUCOSE BLD STRIP.AUTO-MCNC: 119 MG/DL (ref 65–117)
GLUCOSE BLD STRIP.AUTO-MCNC: 120 MG/DL (ref 65–117)
GLUCOSE BLD STRIP.AUTO-MCNC: 137 MG/DL (ref 65–117)
HBA1C MFR BLD: 5.2 % (ref 4–5.6)
HCT VFR BLD AUTO: 27.5 % (ref 35–47)
HGB BLD-MCNC: 7.5 G/DL (ref 11.5–16)
IRON SATN MFR SERPL: 3 % (ref 20–50)
IRON SERPL-MCNC: 12 UG/DL (ref 35–150)
MCH RBC QN AUTO: 18.5 PG (ref 26–34)
MCHC RBC AUTO-ENTMCNC: 27.3 G/DL (ref 30–36.5)
MCV RBC AUTO: 67.9 FL (ref 80–99)
NRBC # BLD: 0 K/UL (ref 0–0.01)
NRBC BLD-RTO: 0 PER 100 WBC
PLATELET # BLD AUTO: 395 K/UL (ref 150–400)
PMV BLD AUTO: 10 FL (ref 8.9–12.9)
RBC # BLD AUTO: 4.05 M/UL (ref 3.8–5.2)
RETICS # AUTO: 0.04 M/UL (ref 0.02–0.08)
RETICS/RBC NFR AUTO: 1 % (ref 0.7–2.1)
SERVICE CMNT-IMP: ABNORMAL
SPECIMEN HOLD: NORMAL
TIBC SERPL-MCNC: 444 UG/DL (ref 250–450)
TSH SERPL DL<=0.05 MIU/L-ACNC: 1.11 UIU/ML (ref 0.36–3.74)
VIT B12 SERPL-MCNC: 448 PG/ML (ref 193–986)
WBC # BLD AUTO: 6.2 K/UL (ref 3.6–11)

## 2023-07-09 PROCEDURE — 6360000002 HC RX W HCPCS: Performed by: HOSPITALIST

## 2023-07-09 PROCEDURE — 85045 AUTOMATED RETICULOCYTE COUNT: CPT

## 2023-07-09 PROCEDURE — G0378 HOSPITAL OBSERVATION PER HR: HCPCS

## 2023-07-09 PROCEDURE — 96375 TX/PRO/DX INJ NEW DRUG ADDON: CPT

## 2023-07-09 PROCEDURE — 82728 ASSAY OF FERRITIN: CPT

## 2023-07-09 PROCEDURE — 83540 ASSAY OF IRON: CPT

## 2023-07-09 PROCEDURE — 2580000003 HC RX 258: Performed by: HOSPITALIST

## 2023-07-09 PROCEDURE — C9113 INJ PANTOPRAZOLE SODIUM, VIA: HCPCS | Performed by: HOSPITALIST

## 2023-07-09 PROCEDURE — 6360000002 HC RX W HCPCS: Performed by: STUDENT IN AN ORGANIZED HEALTH CARE EDUCATION/TRAINING PROGRAM

## 2023-07-09 PROCEDURE — A4216 STERILE WATER/SALINE, 10 ML: HCPCS | Performed by: HOSPITALIST

## 2023-07-09 PROCEDURE — 36430 TRANSFUSION BLD/BLD COMPNT: CPT

## 2023-07-09 PROCEDURE — 2580000003 HC RX 258: Performed by: STUDENT IN AN ORGANIZED HEALTH CARE EDUCATION/TRAINING PROGRAM

## 2023-07-09 PROCEDURE — 83550 IRON BINDING TEST: CPT

## 2023-07-09 PROCEDURE — 82746 ASSAY OF FOLIC ACID SERUM: CPT

## 2023-07-09 PROCEDURE — 96376 TX/PRO/DX INJ SAME DRUG ADON: CPT

## 2023-07-09 PROCEDURE — 85027 COMPLETE CBC AUTOMATED: CPT

## 2023-07-09 PROCEDURE — 82607 VITAMIN B-12: CPT

## 2023-07-09 PROCEDURE — 36415 COLL VENOUS BLD VENIPUNCTURE: CPT

## 2023-07-09 PROCEDURE — 82962 GLUCOSE BLOOD TEST: CPT

## 2023-07-09 PROCEDURE — 84443 ASSAY THYROID STIM HORMONE: CPT

## 2023-07-09 PROCEDURE — G0379 DIRECT REFER HOSPITAL OBSERV: HCPCS

## 2023-07-09 PROCEDURE — 96365 THER/PROPH/DIAG IV INF INIT: CPT

## 2023-07-09 PROCEDURE — 83036 HEMOGLOBIN GLYCOSYLATED A1C: CPT

## 2023-07-09 PROCEDURE — P9016 RBC LEUKOCYTES REDUCED: HCPCS

## 2023-07-09 PROCEDURE — 6370000000 HC RX 637 (ALT 250 FOR IP): Performed by: STUDENT IN AN ORGANIZED HEALTH CARE EDUCATION/TRAINING PROGRAM

## 2023-07-09 RX ORDER — SODIUM CHLORIDE 0.9 % (FLUSH) 0.9 %
5-40 SYRINGE (ML) INJECTION EVERY 12 HOURS SCHEDULED
Status: DISCONTINUED | OUTPATIENT
Start: 2023-07-09 | End: 2023-07-10 | Stop reason: HOSPADM

## 2023-07-09 RX ORDER — POLYETHYLENE GLYCOL 3350 17 G/17G
17 POWDER, FOR SOLUTION ORAL DAILY PRN
Status: DISCONTINUED | OUTPATIENT
Start: 2023-07-09 | End: 2023-07-10 | Stop reason: HOSPADM

## 2023-07-09 RX ORDER — MORPHINE SULFATE 4 MG/ML
4 INJECTION, SOLUTION INTRAMUSCULAR; INTRAVENOUS EVERY 4 HOURS PRN
Status: DISCONTINUED | OUTPATIENT
Start: 2023-07-09 | End: 2023-07-10 | Stop reason: HOSPADM

## 2023-07-09 RX ORDER — ACETAMINOPHEN 650 MG/1
650 SUPPOSITORY RECTAL EVERY 6 HOURS PRN
Status: DISCONTINUED | OUTPATIENT
Start: 2023-07-09 | End: 2023-07-10 | Stop reason: HOSPADM

## 2023-07-09 RX ORDER — INSULIN LISPRO 100 [IU]/ML
0-4 INJECTION, SOLUTION INTRAVENOUS; SUBCUTANEOUS NIGHTLY
Status: DISCONTINUED | OUTPATIENT
Start: 2023-07-09 | End: 2023-07-10 | Stop reason: HOSPADM

## 2023-07-09 RX ORDER — DOCUSATE SODIUM 100 MG/1
100 CAPSULE, LIQUID FILLED ORAL 2 TIMES DAILY
Status: DISCONTINUED | OUTPATIENT
Start: 2023-07-09 | End: 2023-07-10 | Stop reason: HOSPADM

## 2023-07-09 RX ORDER — ACETAMINOPHEN 325 MG/1
650 TABLET ORAL EVERY 6 HOURS PRN
Status: DISCONTINUED | OUTPATIENT
Start: 2023-07-09 | End: 2023-07-10 | Stop reason: HOSPADM

## 2023-07-09 RX ORDER — FERROUS SULFATE 325(65) MG
325 TABLET ORAL 2 TIMES DAILY WITH MEALS
Status: DISCONTINUED | OUTPATIENT
Start: 2023-07-09 | End: 2023-07-10 | Stop reason: HOSPADM

## 2023-07-09 RX ORDER — FENOFIBRATE 160 MG/1
160 TABLET ORAL DAILY
Status: DISCONTINUED | OUTPATIENT
Start: 2023-07-09 | End: 2023-07-10 | Stop reason: HOSPADM

## 2023-07-09 RX ORDER — SODIUM CHLORIDE 0.9 % (FLUSH) 0.9 %
5-40 SYRINGE (ML) INJECTION PRN
Status: DISCONTINUED | OUTPATIENT
Start: 2023-07-09 | End: 2023-07-10 | Stop reason: HOSPADM

## 2023-07-09 RX ORDER — SODIUM CHLORIDE 9 MG/ML
INJECTION, SOLUTION INTRAVENOUS PRN
Status: DISCONTINUED | OUTPATIENT
Start: 2023-07-09 | End: 2023-07-10 | Stop reason: HOSPADM

## 2023-07-09 RX ORDER — DEXTROSE MONOHYDRATE 100 MG/ML
INJECTION, SOLUTION INTRAVENOUS CONTINUOUS PRN
Status: DISCONTINUED | OUTPATIENT
Start: 2023-07-09 | End: 2023-07-10 | Stop reason: HOSPADM

## 2023-07-09 RX ORDER — ONDANSETRON 4 MG/1
4 TABLET, ORALLY DISINTEGRATING ORAL EVERY 8 HOURS PRN
Status: DISCONTINUED | OUTPATIENT
Start: 2023-07-09 | End: 2023-07-10 | Stop reason: HOSPADM

## 2023-07-09 RX ORDER — ONDANSETRON 2 MG/ML
4 INJECTION INTRAMUSCULAR; INTRAVENOUS EVERY 6 HOURS PRN
Status: DISCONTINUED | OUTPATIENT
Start: 2023-07-09 | End: 2023-07-10 | Stop reason: HOSPADM

## 2023-07-09 RX ORDER — INSULIN LISPRO 100 [IU]/ML
0-4 INJECTION, SOLUTION INTRAVENOUS; SUBCUTANEOUS
Status: DISCONTINUED | OUTPATIENT
Start: 2023-07-09 | End: 2023-07-10 | Stop reason: HOSPADM

## 2023-07-09 RX ORDER — PANTOPRAZOLE SODIUM 40 MG/1
40 TABLET, DELAYED RELEASE ORAL
Status: DISCONTINUED | OUTPATIENT
Start: 2023-07-09 | End: 2023-07-09

## 2023-07-09 RX ORDER — SODIUM CHLORIDE 9 MG/ML
INJECTION, SOLUTION INTRAVENOUS CONTINUOUS
Status: DISPENSED | OUTPATIENT
Start: 2023-07-09 | End: 2023-07-10

## 2023-07-09 RX ADMIN — FENOFIBRATE 160 MG: 160 TABLET ORAL at 09:06

## 2023-07-09 RX ADMIN — DOCUSATE SODIUM 100 MG: 100 CAPSULE, LIQUID FILLED ORAL at 09:06

## 2023-07-09 RX ADMIN — PANTOPRAZOLE SODIUM 40 MG: 40 TABLET, DELAYED RELEASE ORAL at 07:44

## 2023-07-09 RX ADMIN — ONDANSETRON 4 MG: 2 INJECTION INTRAMUSCULAR; INTRAVENOUS at 11:12

## 2023-07-09 RX ADMIN — FERROUS SULFATE TAB 325 MG (65 MG ELEMENTAL FE) 325 MG: 325 (65 FE) TAB at 07:44

## 2023-07-09 RX ADMIN — MORPHINE SULFATE 4 MG: 4 INJECTION, SOLUTION INTRAMUSCULAR; INTRAVENOUS at 12:37

## 2023-07-09 RX ADMIN — ACETAMINOPHEN 650 MG: 325 TABLET ORAL at 17:57

## 2023-07-09 RX ADMIN — SODIUM CHLORIDE, PRESERVATIVE FREE 40 MG: 5 INJECTION INTRAVENOUS at 23:27

## 2023-07-09 RX ADMIN — SODIUM CHLORIDE, PRESERVATIVE FREE 10 ML: 5 INJECTION INTRAVENOUS at 09:13

## 2023-07-09 RX ADMIN — SODIUM CHLORIDE: 9 INJECTION, SOLUTION INTRAVENOUS at 12:36

## 2023-07-09 RX ADMIN — SODIUM CHLORIDE 250 MG: 9 INJECTION, SOLUTION INTRAVENOUS at 09:06

## 2023-07-09 RX ADMIN — SODIUM CHLORIDE, PRESERVATIVE FREE 40 MG: 5 INJECTION INTRAVENOUS at 12:36

## 2023-07-09 RX ADMIN — ACETAMINOPHEN 650 MG: 325 TABLET ORAL at 23:32

## 2023-07-09 ASSESSMENT — PAIN DESCRIPTION - ORIENTATION: ORIENTATION: UPPER

## 2023-07-09 ASSESSMENT — PAIN DESCRIPTION - DESCRIPTORS: DESCRIPTORS: ACHING

## 2023-07-09 ASSESSMENT — PAIN SCALES - GENERAL
PAINLEVEL_OUTOF10: 4
PAINLEVEL_OUTOF10: 8

## 2023-07-09 ASSESSMENT — PAIN DESCRIPTION - LOCATION
LOCATION: ABDOMEN
LOCATION: ABDOMEN

## 2023-07-10 ENCOUNTER — ANESTHESIA EVENT (OUTPATIENT)
Facility: HOSPITAL | Age: 33
End: 2023-07-10
Payer: MEDICAID

## 2023-07-10 ENCOUNTER — ANESTHESIA (OUTPATIENT)
Facility: HOSPITAL | Age: 33
End: 2023-07-10
Payer: MEDICAID

## 2023-07-10 VITALS
TEMPERATURE: 98.4 F | SYSTOLIC BLOOD PRESSURE: 116 MMHG | OXYGEN SATURATION: 97 % | DIASTOLIC BLOOD PRESSURE: 60 MMHG | RESPIRATION RATE: 20 BRPM | HEART RATE: 63 BPM

## 2023-07-10 LAB
ABO + RH BLD: NORMAL
ALBUMIN SERPL-MCNC: 3.3 G/DL (ref 3.5–5)
ALBUMIN/GLOB SERPL: 0.9 (ref 1.1–2.2)
ALP SERPL-CCNC: 86 U/L (ref 45–117)
ALT SERPL-CCNC: 22 U/L (ref 12–78)
ANION GAP SERPL CALC-SCNC: 8 MMOL/L (ref 5–15)
AST SERPL-CCNC: 23 U/L (ref 15–37)
BILIRUB SERPL-MCNC: 0.7 MG/DL (ref 0.2–1)
BLD PROD TYP BPU: NORMAL
BLOOD BANK DISPENSE STATUS: NORMAL
BLOOD GROUP ANTIBODIES SERPL: NORMAL
BPU ID: NORMAL
BUN SERPL-MCNC: 6 MG/DL (ref 6–20)
BUN/CREAT SERPL: 10 (ref 12–20)
CALCIUM SERPL-MCNC: 8.2 MG/DL (ref 8.5–10.1)
CHLORIDE SERPL-SCNC: 114 MMOL/L (ref 97–108)
CO2 SERPL-SCNC: 18 MMOL/L (ref 21–32)
CREAT SERPL-MCNC: 0.58 MG/DL (ref 0.55–1.02)
CROSSMATCH RESULT: NORMAL
ERYTHROCYTE [DISTWIDTH] IN BLOOD BY AUTOMATED COUNT: 20.6 % (ref 11.5–14.5)
GLOBULIN SER CALC-MCNC: 3.6 G/DL (ref 2–4)
GLUCOSE BLD STRIP.AUTO-MCNC: 110 MG/DL (ref 65–117)
GLUCOSE BLD STRIP.AUTO-MCNC: 110 MG/DL (ref 65–117)
GLUCOSE SERPL-MCNC: 118 MG/DL (ref 65–100)
HCT VFR BLD AUTO: 30.2 % (ref 35–47)
HGB BLD-MCNC: 7.9 G/DL (ref 11.5–16)
MCH RBC QN AUTO: 18.7 PG (ref 26–34)
MCHC RBC AUTO-ENTMCNC: 26.2 G/DL (ref 30–36.5)
MCV RBC AUTO: 71.6 FL (ref 80–99)
NRBC # BLD: 0 K/UL (ref 0–0.01)
NRBC BLD-RTO: 0 PER 100 WBC
PLATELET # BLD AUTO: 302 K/UL (ref 150–400)
PMV BLD AUTO: 9.8 FL (ref 8.9–12.9)
POTASSIUM SERPL-SCNC: 3.9 MMOL/L (ref 3.5–5.1)
PROT SERPL-MCNC: 6.9 G/DL (ref 6.4–8.2)
RBC # BLD AUTO: 4.22 M/UL (ref 3.8–5.2)
SERVICE CMNT-IMP: NORMAL
SERVICE CMNT-IMP: NORMAL
SODIUM SERPL-SCNC: 140 MMOL/L (ref 136–145)
SPECIMEN EXP DATE BLD: NORMAL
UNIT DIVISION: 0
WBC # BLD AUTO: 7 K/UL (ref 3.6–11)

## 2023-07-10 PROCEDURE — 2500000003 HC RX 250 WO HCPCS: Performed by: NURSE ANESTHETIST, CERTIFIED REGISTERED

## 2023-07-10 PROCEDURE — 2580000003 HC RX 258: Performed by: HOSPITALIST

## 2023-07-10 PROCEDURE — C1713 ANCHOR/SCREW BN/BN,TIS/BN: HCPCS | Performed by: INTERNAL MEDICINE

## 2023-07-10 PROCEDURE — 3600007512: Performed by: INTERNAL MEDICINE

## 2023-07-10 PROCEDURE — 88342 IMHCHEM/IMCYTCHM 1ST ANTB: CPT

## 2023-07-10 PROCEDURE — 36415 COLL VENOUS BLD VENIPUNCTURE: CPT

## 2023-07-10 PROCEDURE — 7100000011 HC PHASE II RECOVERY - ADDTL 15 MIN: Performed by: INTERNAL MEDICINE

## 2023-07-10 PROCEDURE — 96366 THER/PROPH/DIAG IV INF ADDON: CPT

## 2023-07-10 PROCEDURE — 3600007502: Performed by: INTERNAL MEDICINE

## 2023-07-10 PROCEDURE — 88305 TISSUE EXAM BY PATHOLOGIST: CPT

## 2023-07-10 PROCEDURE — 85027 COMPLETE CBC AUTOMATED: CPT

## 2023-07-10 PROCEDURE — 2720000010 HC SURG SUPPLY STERILE: Performed by: INTERNAL MEDICINE

## 2023-07-10 PROCEDURE — 2580000003 HC RX 258: Performed by: INTERNAL MEDICINE

## 2023-07-10 PROCEDURE — G0378 HOSPITAL OBSERVATION PER HR: HCPCS

## 2023-07-10 PROCEDURE — 6360000002 HC RX W HCPCS: Performed by: HOSPITALIST

## 2023-07-10 PROCEDURE — 6360000002 HC RX W HCPCS: Performed by: NURSE ANESTHETIST, CERTIFIED REGISTERED

## 2023-07-10 PROCEDURE — 3700000000 HC ANESTHESIA ATTENDED CARE: Performed by: INTERNAL MEDICINE

## 2023-07-10 PROCEDURE — 80053 COMPREHEN METABOLIC PANEL: CPT

## 2023-07-10 PROCEDURE — 2709999900 HC NON-CHARGEABLE SUPPLY: Performed by: INTERNAL MEDICINE

## 2023-07-10 PROCEDURE — 7100000010 HC PHASE II RECOVERY - FIRST 15 MIN: Performed by: INTERNAL MEDICINE

## 2023-07-10 PROCEDURE — 82962 GLUCOSE BLOOD TEST: CPT

## 2023-07-10 PROCEDURE — 3700000001 HC ADD 15 MINUTES (ANESTHESIA): Performed by: INTERNAL MEDICINE

## 2023-07-10 RX ORDER — SODIUM CHLORIDE 9 MG/ML
25 INJECTION, SOLUTION INTRAVENOUS PRN
Status: DISCONTINUED | OUTPATIENT
Start: 2023-07-10 | End: 2023-07-10 | Stop reason: HOSPADM

## 2023-07-10 RX ORDER — SODIUM CHLORIDE 9 MG/ML
INJECTION, SOLUTION INTRAVENOUS CONTINUOUS
Status: DISCONTINUED | OUTPATIENT
Start: 2023-07-10 | End: 2023-07-10

## 2023-07-10 RX ORDER — LIDOCAINE HYDROCHLORIDE 20 MG/ML
INJECTION, SOLUTION EPIDURAL; INFILTRATION; INTRACAUDAL; PERINEURAL PRN
Status: DISCONTINUED | OUTPATIENT
Start: 2023-07-10 | End: 2023-07-10 | Stop reason: SDUPTHER

## 2023-07-10 RX ORDER — PANTOPRAZOLE SODIUM 40 MG/1
40 TABLET, DELAYED RELEASE ORAL
Qty: 60 TABLET | Refills: 1 | Status: SHIPPED | OUTPATIENT
Start: 2023-07-10

## 2023-07-10 RX ORDER — SODIUM CHLORIDE 0.9 % (FLUSH) 0.9 %
5-40 SYRINGE (ML) INJECTION PRN
Status: DISCONTINUED | OUTPATIENT
Start: 2023-07-10 | End: 2023-07-10 | Stop reason: HOSPADM

## 2023-07-10 RX ORDER — SODIUM CHLORIDE 0.9 % (FLUSH) 0.9 %
5-40 SYRINGE (ML) INJECTION EVERY 12 HOURS SCHEDULED
Status: DISCONTINUED | OUTPATIENT
Start: 2023-07-10 | End: 2023-07-10 | Stop reason: HOSPADM

## 2023-07-10 RX ORDER — POLYETHYLENE GLYCOL 3350 17 G/17G
17 POWDER, FOR SOLUTION ORAL DAILY PRN
Qty: 90 G | Refills: 0 | Status: SHIPPED | COMMUNITY
Start: 2023-07-10 | End: 2023-08-09

## 2023-07-10 RX ORDER — FERROUS SULFATE 325(65) MG
325 TABLET ORAL 2 TIMES DAILY
Qty: 60 TABLET | Refills: 1 | Status: SHIPPED | OUTPATIENT
Start: 2023-07-10

## 2023-07-10 RX ORDER — PANTOPRAZOLE SODIUM 40 MG/1
40 TABLET, DELAYED RELEASE ORAL
Status: DISCONTINUED | OUTPATIENT
Start: 2023-07-10 | End: 2023-07-10 | Stop reason: HOSPADM

## 2023-07-10 RX ADMIN — PROPOFOL 50 MG: 10 INJECTION, EMULSION INTRAVENOUS at 11:15

## 2023-07-10 RX ADMIN — SODIUM CHLORIDE: 9 INJECTION, SOLUTION INTRAVENOUS at 12:26

## 2023-07-10 RX ADMIN — SODIUM CHLORIDE 250 MG: 9 INJECTION, SOLUTION INTRAVENOUS at 12:26

## 2023-07-10 RX ADMIN — SODIUM CHLORIDE, PRESERVATIVE FREE 10 ML: 5 INJECTION INTRAVENOUS at 12:26

## 2023-07-10 RX ADMIN — LIDOCAINE HYDROCHLORIDE 50 MG: 20 INJECTION, SOLUTION EPIDURAL; INFILTRATION; INTRACAUDAL; PERINEURAL at 11:12

## 2023-07-10 RX ADMIN — PROPOFOL 120 MG: 10 INJECTION, EMULSION INTRAVENOUS at 11:12

## 2023-07-10 ASSESSMENT — PAIN SCALES - GENERAL
PAINLEVEL_OUTOF10: 3
PAINLEVEL_OUTOF10: 0

## 2023-07-10 ASSESSMENT — PAIN DESCRIPTION - LOCATION: LOCATION: ABDOMEN

## 2023-07-10 NOTE — ANESTHESIA POSTPROCEDURE EVALUATION
Department of Anesthesiology  Postprocedure Note    Patient: Lane Vernon  MRN: [id number]  YOB: 1990  Date of evaluation: 7/10/2023      Procedure Summary     Date: 07/10/23 Room / Location: [facility] ENDO 02 / [facility] ENDOSCOPY    Anesthesia Start: 1112 Anesthesia Stop: 1122    Procedures:       EGD ESOPHAGOGASTRODUODENOSCOPY (Upper GI Region)      EGD BIOPSY (Upper GI Region) Diagnosis:       Abdominal pain, unspecified abdominal location      (Abdominal pain, unspecified abdominal location [R10.9])    Surgeons: Lillian Doyle MD Responsible Provider: Taryn Chamberlain DO    Anesthesia Type: MAC ASA Status: 3          Anesthesia Type: No value filed.     Cassandra Phase I: Cassandra Score: 10    Cassandra Phase II: Cassandra Score: 10      Anesthesia Post Evaluation    Patient location during evaluation: PACU  Patient participation: complete - patient participated  Level of consciousness: awake and alert  Airway patency: patent  Nausea & Vomiting: no nausea and no vomiting  Complications: no  Cardiovascular status: hemodynamically stable  Respiratory status: acceptable  Hydration status: euvolemic

## 2023-07-10 NOTE — DISCHARGE INSTRUCTIONS
Discharge Instructions       PATIENT ID: Livier Hassan  MRN: [id number]   YOB: 1990    DATE OF ADMISSION: [unfilled]    DATE OF DISCHARGE: 7/10/2023    PRIMARY CARE PROVIDER: @PCP@     ATTENDING PHYSICIAN: [unfilled]  DISCHARGING PROVIDER: Jordan Mccall MD    To contact this individual call [phone] and ask the  to page. If unavailable ask to be transferred the Adult Hospitalist Department. DISCHARGE DIAGNOSES Iron deficiency anemia, acute gastritis     CONSULTATIONS: [unfilled]    PROCEDURES/SURGERIES: Procedure(s):  EGD ESOPHAGOGASTRODUODENOSCOPY  EGD BIOPSY    PENDING TEST RESULTS:   At the time of discharge the following test results are still pending: NONE     FOLLOW UP APPOINTMENTS:   [unfilled]     ADDITIONAL CARE RECOMMENDATIONS:   Can take OTC tylenol (Acetaminophen) as needed for headache, should avoid pain medicine such as Motrin, Aleve, ibuprofen etc ( ALL CALLED NSAIDS) medications that will cause stomach upset. Iron pills may cause constipation, so can take OTC miralax as needed   Should follow up GI doctor to have colonoscopy to complete the work up of iron deficiency , also follow up the stomach biopsy result with your GI    Follow up your PCP regarding diabetes       DIET: diabetic diet      ACTIVITY: activity as tolerated          Radiology      DISCHARGE MEDICATIONS:   See Medication Reconciliation Form    It is important that you take the medication exactly as they are prescribed. Keep your medication in the bottles provided by the pharmacist and keep a list of the medication names, dosages, and times to be taken in your wallet. Do not take other medications without consulting your doctor. NOTIFY YOUR PHYSICIAN FOR ANY OF THE FOLLOWING:   Fever over 101 degrees for 24 hours. Chest pain, shortness of breath, fever, chills, nausea, vomiting, diarrhea, change in mentation, falling, weakness, bleeding. Severe pain or pain not relieved by medications.   Or,

## 2023-07-10 NOTE — ANESTHESIA PRE PROCEDURE
BEART, [id number]     Type & Screen (If Applicable):  No results found for: LABABO, LABRH    Drug/Infectious Status (If Applicable):  No results found for: HIV, HEPCAB    COVID-19 Screening (If Applicable): No results found for: COVID19        Anesthesia Evaluation  Patient summary reviewed and Nursing notes reviewed  Airway: Mallampati: II     Neck ROM: full  Mouth opening: > = 3 FB   Dental: normal exam         Pulmonary:Negative Pulmonary ROS breath sounds clear to auscultation                             Cardiovascular:  Exercise tolerance: poor (<4 METS),   (+) hyperlipidemia        Rhythm: regular  Rate: normal           Beta Blocker:  Not on Beta Blocker         Neuro/Psych:   (+) neuromuscular disease:, headaches: migraine headaches,             GI/Hepatic/Renal:   (+) GERD: well controlled,           Endo/Other:    (+) DiabetesType II DM, using insulin, . Pt had no PAT visit       Abdominal:             Vascular: negative vascular ROS. Other Findings:           Anesthesia Plan      MAC     ASA 3       Induction: intravenous. Anesthetic plan and risks discussed with patient. Plan discussed with CRNA.                     Nguyen Lutz DO   7/10/2023

## 2023-07-10 NOTE — DISCHARGE SUMMARY
Discharge Summary       PATIENT ID: Joo Ta  MRN: [id number]   YOB: 1990    DATE OF ADMISSION: 7/9/2023  5:12 AM    DATE OF DISCHARGE: 7/10/2023   PRIMARY CARE PROVIDER: Hannah Pulido DO     ATTENDING PHYSICIAN: Home Munoz MD   DISCHARGING PROVIDER: Home Munoz MD    To contact this individual call [phone] and ask the  to page. If unavailable ask to be transferred the Adult Hospitalist Department. CONSULTATIONS: IP CONSULT TO GI    PROCEDURES/SURGERIES: Procedure(s):  EGD ESOPHAGOGASTRODUODENOSCOPY  EGD BIOPSY    ADMITTING DIAGNOSES & HOSPITAL COURSE:    Interviewed patient via  through phone [phone] , patient speaks Emiliano Charu or Borissi   Complaining epigastric sharp pain last 2-3 days, severe, had several liquid monitor,   Denied vomiting bloody, denied black stools or blood in stools  Pt said she has very light period,only 3 days every month. ABD pelvic ct reviewed, unlikely due to her period, may GI or nutrition related      Patient prefer female physician due to Oriental orthodox reason         [place] / PLAN:      Severe iron deficiency anemia   -etiology unclear, unlikely GYN course has normal abd/pelvic ct  -possible GI source or malnutrition related,   Transfused one unit prbc  Ferritin at 3  Iv iron now. Following cbc  Gi consult , EGD 7/10:There is focal non-specific erythema and granularity in the fundus that is biopsied. Stomach otherwise normal. Biopsies taken from body and antrum. No ulcer or mass. Advance diet, stable to home   PO BID PPI  - await pathology results  - will need outpatient colonoscopy given iron deficiency anemia  - iron repletion     Epigastric  pain: possible acute gastritis  Continue ppi  Pain control  Prn zofran  Ivf   EGD as above   Dc home      DM   Ssi   Resume home meds     Details of plan, test result, treatment and follow up insturcitons all discussed with patient via Johana  . All questions answered.

## 2023-07-10 NOTE — OP NOTE
[facility]  [facility] - [address] and [address]  C. Theodore Nogueira MD  [phone]      July 10, 2023    Esophagogastroduodenoscopy (EGD) Procedure Note  Rodney Cooper  : 1990  Luke Russell Medical Record Number: [id number]      Indications:   Epigastric pain, microcytic anemia  Referring Physician:  Dipesh Jackson DO  Anesthesia/Sedation: See Anesthesia Record  Endoscopist:  Dr. Sharron Rodriguez  Complications:  None  Estimated Blood Loss:  None    Surgical assistant: Circulator: Mendel Gather, RN  Endoscopy Technician: Shaquille Huang     Implants none unless otherwise specified. Permit:  The indications, risks, benefits and alternatives were reviewed with the patient or their decision maker who was provided an opportunity to ask questions and all questions were answered. The specific risks of esophagogastroduodenoscopy with conscious sedation were reviewed, including but not limited to anesthetic complication, bleeding, adverse drug reaction, missed lesion, infection, IV site reactions, and intestinal perforation which would lead to the need for surgical repair. Alternatives to EGD including radiographic imaging, observation without testing, or laboratory testing were reviewed as well as the limitations of those alternatives discussed. After considering the options and having all their questions answered, the patient or their decision maker provided both verbal and written consent to proceed. Procedure in Detail:  After obtaining informed consent, positioning of the patient in the left lateral decubitus position, and conduction of a pre-procedure pause or \"time out\" the endoscope was introduced into the mouth and advanced to the duodenum. A careful inspection was made, and findings or interventions are described below. Findings:   Esophagus: Normal.  Stomach:  There is focal non-specific erythema and granularity

## 2023-07-10 NOTE — PROGRESS NOTES
1430: New orders rec'd for patient discharge home. Discharge education printed and reviewed with patient and  at bedside; special regard given to changes in medication. Questions asked and answered. Patient declined wheelchair escort to lobby; ambulated off unit with family and without incident. All personal belongings and discharge education with patient.
Attempted to deliver and verbally explain the Carnella Turkish with patient. Patient was ABBIE (Off The Floor).  Tesha Cook, Care Management Assistant
Endoscopy recovery  Patient returned to baseline, vital signs stable (see vital sign flowsheet). Patient offered liquids and tolerated well. Respiratory status within defined limits. Responsible party driving patient home was given the opportunity to ask questions. Patient discharged with documented belongings.
TRANSFER - IN REPORT:    Verbal report received from [facility] on [facility]  being received from [address] [place] [address] for ordered procedure  *EGD w/ Dr. Valencia Rudd    Report consisted of patient's Situation, Background, Assessment and   Recommendations(SBAR). Information from the following report(s) Nurse Handoff Report, Intake/Output, MAR, and Recent Results was reviewed with the receiving nurse. Opportunity for questions and clarification was provided. Assessment completed upon patient's arrival to unit and care assumed. Initial RN admission and assessment performed and documented in Endoscopy navigator. Patient evaluated by anesthesia in pre-procedure holding. All procedural vital signs, airway assessment, and level of consciousness information monitored and recorded by anesthesia staff on the anesthesia record. Report received from CRNA post procedure. Patient transported to recovery area by RN. Endoscope was pre-cleaned at bedside immediately following procedure by Karen Mendieta. TRANSFER - OUT REPORT:    Verbal report given to [facility] RN on [facility]  being transferred to [address] [place] [address] for routine post-op       Report consisted of patient's Situation, Background, Assessment and   Recommendations(SBAR). Information from the following report(s) Nurse Handoff Report and Surgery Report was reviewed with the receiving nurse. Lines:   Peripheral IV 07/09/23 Left;Posterior Hand (Active)   Site Assessment Clean, dry & intact 07/10/23 0920   Line Status Flushed; Infusing 07/10/23 0920   Line Care Connections checked and tightened 07/10/23 0920   Phlebitis Assessment No symptoms 07/10/23 0920   Infiltration Assessment 0 07/10/23 0920   Alcohol Cap Used Yes 07/10/23 0920   Dressing Status Clean, dry & intact 07/10/23 0920   Dressing Type Transparent 07/10/23 0920        Opportunity for questions and clarification was provided.       Patient transported with:  [facility]
Normal rate, regular rhythm.  Heart sounds S1, S2.  No murmurs, rubs or gallops.

## 2023-07-19 DIAGNOSIS — R10.2 FEMALE PELVIC PAIN: ICD-10-CM

## 2023-07-19 DIAGNOSIS — N91.1 SECONDARY AMENORRHEA: ICD-10-CM

## 2023-07-19 DIAGNOSIS — E55.9 VITAMIN D DEFICIENCY: ICD-10-CM

## 2023-07-19 DIAGNOSIS — E11.42 TYPE 2 DIABETES MELLITUS WITH DIABETIC POLYNEUROPATHY, WITHOUT LONG-TERM CURRENT USE OF INSULIN (HCC): Primary | ICD-10-CM

## 2023-07-19 DIAGNOSIS — E66.9 OBESITY (BMI 30.0-34.9): ICD-10-CM

## 2023-07-19 DIAGNOSIS — D50.9 MICROCYTIC HYPOCHROMIC ANEMIA: ICD-10-CM

## 2023-07-19 DIAGNOSIS — D64.9 ANEMIA, UNSPECIFIED TYPE: ICD-10-CM

## 2023-08-30 DIAGNOSIS — N91.1 SECONDARY AMENORRHEA: ICD-10-CM

## 2023-08-30 DIAGNOSIS — E11.42 TYPE 2 DIABETES MELLITUS WITH DIABETIC POLYNEUROPATHY, WITHOUT LONG-TERM CURRENT USE OF INSULIN (HCC): ICD-10-CM

## 2023-08-30 DIAGNOSIS — R10.2 FEMALE PELVIC PAIN: ICD-10-CM

## 2023-08-30 DIAGNOSIS — E66.9 OBESITY (BMI 30.0-34.9): ICD-10-CM

## 2023-08-30 DIAGNOSIS — E55.9 VITAMIN D DEFICIENCY: ICD-10-CM

## 2023-08-30 DIAGNOSIS — D50.9 MICROCYTIC HYPOCHROMIC ANEMIA: ICD-10-CM

## 2023-08-30 DIAGNOSIS — D64.9 ANEMIA, UNSPECIFIED TYPE: ICD-10-CM

## 2023-11-20 NOTE — PROGRESS NOTES
Chief Complaint   Patient presents with    Complete Physical     Non fasting .  concerned about irregular menstrual cycles    Headache    Leg Pain     bilateral     Other     mood changes , get very anxious and angry with spouse and others very quickly. Use of  with [employer] phone:   Cortez Monge is language. 1. Have you been to the ER, urgent care clinic since your last visit? Hospitalized since your last visit? No    2. Have you seen or consulted any other health care providers outside of the [address] [place] [address] since your last visit? Include any pap smears or colon screening.  No Medication refill requested for valsartan (DIOVAN) 160 MG tablet  Last Refill/Prescribed: Date 11.3.23, # of tablets: 90, # of refills approved:0   Medication: valsartan (DIOVAN) 160 MG tablet  Last office visit date: 11.29.22 Lanie Brizuela APNP  Medication Refill Protocol Failed.  Failed criteria: Normal eGFR within last 12 months looking at last value. Sent to clinician to review.    ------------------------------------------------------------------------------------------  Pending appointment  Confirmed with patient.  11/27/2023 8:00 AM Lanie Brizuela APNP      Patient is scheduled for 11/27 but does not have enouh medication to last until then.    Call placed to pharmacy-pharmacy is closed at this time.    Patient will call pharmacy and inquire about refill that is on file at pharmacy.

## 2024-09-16 ENCOUNTER — OFFICE VISIT (OUTPATIENT)
Age: 34
End: 2024-09-16
Payer: MEDICAID

## 2024-09-16 VITALS
SYSTOLIC BLOOD PRESSURE: 129 MMHG | DIASTOLIC BLOOD PRESSURE: 73 MMHG | TEMPERATURE: 98.8 F | OXYGEN SATURATION: 99 % | BODY MASS INDEX: 29.91 KG/M2 | HEIGHT: 61 IN | RESPIRATION RATE: 20 BRPM | WEIGHT: 158.4 LBS | HEART RATE: 64 BPM

## 2024-09-16 DIAGNOSIS — G89.29 CHRONIC BILATERAL THORACIC BACK PAIN: ICD-10-CM

## 2024-09-16 DIAGNOSIS — Z13.220 SCREENING FOR CHOLESTEROL LEVEL: ICD-10-CM

## 2024-09-16 DIAGNOSIS — Z76.89 ENCOUNTER TO ESTABLISH CARE: Primary | ICD-10-CM

## 2024-09-16 DIAGNOSIS — E11.42 TYPE 2 DIABETES MELLITUS WITH DIABETIC POLYNEUROPATHY, WITHOUT LONG-TERM CURRENT USE OF INSULIN (HCC): ICD-10-CM

## 2024-09-16 DIAGNOSIS — D64.9 ANEMIA, UNSPECIFIED TYPE: ICD-10-CM

## 2024-09-16 DIAGNOSIS — E55.9 VITAMIN D DEFICIENCY: ICD-10-CM

## 2024-09-16 DIAGNOSIS — F43.21 ADJUSTMENT DISORDER WITH DEPRESSED MOOD: ICD-10-CM

## 2024-09-16 DIAGNOSIS — N97.9 FEMALE INFERTILITY, UNSPECIFIED: ICD-10-CM

## 2024-09-16 DIAGNOSIS — G44.229 CHRONIC TENSION-TYPE HEADACHE, NOT INTRACTABLE: ICD-10-CM

## 2024-09-16 DIAGNOSIS — M54.6 CHRONIC BILATERAL THORACIC BACK PAIN: ICD-10-CM

## 2024-09-16 DIAGNOSIS — K21.9 GASTROESOPHAGEAL REFLUX DISEASE WITHOUT ESOPHAGITIS: ICD-10-CM

## 2024-09-16 DIAGNOSIS — D50.9 IRON DEFICIENCY ANEMIA, UNSPECIFIED IRON DEFICIENCY ANEMIA TYPE: ICD-10-CM

## 2024-09-16 LAB — HBA1C MFR BLD: 5.8 %

## 2024-09-16 PROCEDURE — 99204 OFFICE O/P NEW MOD 45 MIN: CPT | Performed by: STUDENT IN AN ORGANIZED HEALTH CARE EDUCATION/TRAINING PROGRAM

## 2024-09-16 PROCEDURE — 83036 HEMOGLOBIN GLYCOSYLATED A1C: CPT | Performed by: STUDENT IN AN ORGANIZED HEALTH CARE EDUCATION/TRAINING PROGRAM

## 2024-09-16 SDOH — ECONOMIC STABILITY: FOOD INSECURITY: WITHIN THE PAST 12 MONTHS, THE FOOD YOU BOUGHT JUST DIDN'T LAST AND YOU DIDN'T HAVE MONEY TO GET MORE.: NEVER TRUE

## 2024-09-16 SDOH — ECONOMIC STABILITY: FOOD INSECURITY: WITHIN THE PAST 12 MONTHS, YOU WORRIED THAT YOUR FOOD WOULD RUN OUT BEFORE YOU GOT MONEY TO BUY MORE.: NEVER TRUE

## 2024-09-16 SDOH — ECONOMIC STABILITY: INCOME INSECURITY: HOW HARD IS IT FOR YOU TO PAY FOR THE VERY BASICS LIKE FOOD, HOUSING, MEDICAL CARE, AND HEATING?: NOT HARD AT ALL

## 2024-09-16 ASSESSMENT — PATIENT HEALTH QUESTIONNAIRE - PHQ9
10. IF YOU CHECKED OFF ANY PROBLEMS, HOW DIFFICULT HAVE THESE PROBLEMS MADE IT FOR YOU TO DO YOUR WORK, TAKE CARE OF THINGS AT HOME, OR GET ALONG WITH OTHER PEOPLE: VERY DIFFICULT
9. THOUGHTS THAT YOU WOULD BE BETTER OFF DEAD, OR OF HURTING YOURSELF: NOT AT ALL
5. POOR APPETITE OR OVEREATING: MORE THAN HALF THE DAYS
SUM OF ALL RESPONSES TO PHQ QUESTIONS 1-9: 9
SUM OF ALL RESPONSES TO PHQ9 QUESTIONS 1 & 2: 4
1. LITTLE INTEREST OR PLEASURE IN DOING THINGS: MORE THAN HALF THE DAYS
6. FEELING BAD ABOUT YOURSELF - OR THAT YOU ARE A FAILURE OR HAVE LET YOURSELF OR YOUR FAMILY DOWN: NOT AT ALL
SUM OF ALL RESPONSES TO PHQ QUESTIONS 1-9: 9
4. FEELING TIRED OR HAVING LITTLE ENERGY: SEVERAL DAYS
SUM OF ALL RESPONSES TO PHQ QUESTIONS 1-9: 9
7. TROUBLE CONCENTRATING ON THINGS, SUCH AS READING THE NEWSPAPER OR WATCHING TELEVISION: NOT AT ALL
3. TROUBLE FALLING OR STAYING ASLEEP: MORE THAN HALF THE DAYS
2. FEELING DOWN, DEPRESSED OR HOPELESS: MORE THAN HALF THE DAYS
SUM OF ALL RESPONSES TO PHQ QUESTIONS 1-9: 9
8. MOVING OR SPEAKING SO SLOWLY THAT OTHER PEOPLE COULD HAVE NOTICED. OR THE OPPOSITE, BEING SO FIGETY OR RESTLESS THAT YOU HAVE BEEN MOVING AROUND A LOT MORE THAN USUAL: NOT AT ALL

## 2024-09-17 LAB
25(OH)D3+25(OH)D2 SERPL-MCNC: 13.1 NG/ML (ref 30–100)
ALBUMIN SERPL-MCNC: 4.6 G/DL (ref 3.9–4.9)
ALBUMIN/CREAT UR: 17 MG/G CREAT (ref 0–29)
ALP SERPL-CCNC: 111 IU/L (ref 44–121)
ALT SERPL-CCNC: 26 IU/L (ref 0–32)
AST SERPL-CCNC: 19 IU/L (ref 0–40)
BASOPHILS # BLD AUTO: 0 X10E3/UL (ref 0–0.2)
BASOPHILS NFR BLD AUTO: 0 %
BILIRUB SERPL-MCNC: 0.5 MG/DL (ref 0–1.2)
BUN SERPL-MCNC: 9 MG/DL (ref 6–20)
BUN/CREAT SERPL: 18 (ref 9–23)
CALCIUM SERPL-MCNC: 9.6 MG/DL (ref 8.7–10.2)
CHLORIDE SERPL-SCNC: 105 MMOL/L (ref 96–106)
CHOLEST SERPL-MCNC: 134 MG/DL (ref 100–199)
CO2 SERPL-SCNC: 23 MMOL/L (ref 20–29)
CREAT SERPL-MCNC: 0.49 MG/DL (ref 0.57–1)
CREAT UR-MCNC: 151.6 MG/DL
EGFRCR SERPLBLD CKD-EPI 2021: 127 ML/MIN/1.73
EOSINOPHIL # BLD AUTO: 0.1 X10E3/UL (ref 0–0.4)
EOSINOPHIL NFR BLD AUTO: 1 %
ERYTHROCYTE [DISTWIDTH] IN BLOOD BY AUTOMATED COUNT: 15.3 % (ref 11.7–15.4)
FERRITIN SERPL-MCNC: 16 NG/ML (ref 15–150)
GLOBULIN SER CALC-MCNC: 3.4 G/DL (ref 1.5–4.5)
GLUCOSE SERPL-MCNC: 88 MG/DL (ref 70–99)
HCT VFR BLD AUTO: 37.8 % (ref 34–46.6)
HDLC SERPL-MCNC: 35 MG/DL
HGB BLD-MCNC: 11.3 G/DL (ref 11.1–15.9)
IMM GRANULOCYTES # BLD AUTO: 0 X10E3/UL (ref 0–0.1)
IMM GRANULOCYTES NFR BLD AUTO: 0 %
IRON SATN MFR SERPL: 4 % (ref 15–55)
IRON SERPL-MCNC: 18 UG/DL (ref 27–159)
LDLC SERPL CALC-MCNC: 82 MG/DL (ref 0–99)
LYMPHOCYTES # BLD AUTO: 1.9 X10E3/UL (ref 0.7–3.1)
LYMPHOCYTES NFR BLD AUTO: 18 %
MCH RBC QN AUTO: 24.6 PG (ref 26.6–33)
MCHC RBC AUTO-ENTMCNC: 29.9 G/DL (ref 31.5–35.7)
MCV RBC AUTO: 82 FL (ref 79–97)
MICROALBUMIN UR-MCNC: 25.8 UG/ML
MONOCYTES # BLD AUTO: 0.7 X10E3/UL (ref 0.1–0.9)
MONOCYTES NFR BLD AUTO: 6 %
NEUTROPHILS # BLD AUTO: 7.6 X10E3/UL (ref 1.4–7)
NEUTROPHILS NFR BLD AUTO: 75 %
PLATELET # BLD AUTO: 381 X10E3/UL (ref 150–450)
POTASSIUM SERPL-SCNC: 4.8 MMOL/L (ref 3.5–5.2)
PROT SERPL-MCNC: 8 G/DL (ref 6–8.5)
RBC # BLD AUTO: 4.6 X10E6/UL (ref 3.77–5.28)
SODIUM SERPL-SCNC: 140 MMOL/L (ref 134–144)
TIBC SERPL-MCNC: 434 UG/DL (ref 250–450)
TRIGL SERPL-MCNC: 90 MG/DL (ref 0–149)
TSH SERPL DL<=0.005 MIU/L-ACNC: 0.57 UIU/ML (ref 0.45–4.5)
UIBC SERPL-MCNC: 416 UG/DL (ref 131–425)
VIT B12 SERPL-MCNC: 613 PG/ML (ref 232–1245)
VLDLC SERPL CALC-MCNC: 17 MG/DL (ref 5–40)
WBC # BLD AUTO: 10.3 X10E3/UL (ref 3.4–10.8)

## 2024-09-19 LAB
ALBUMIN/CREAT UR: 12 MG/G CREAT (ref 0–29)
CREAT UR-MCNC: 72 MG/DL
MICROALBUMIN UR-MCNC: 8.9 UG/ML

## 2024-11-24 ENCOUNTER — APPOINTMENT (OUTPATIENT)
Facility: HOSPITAL | Age: 34
End: 2024-11-24
Payer: MEDICAID

## 2024-11-24 ENCOUNTER — HOSPITAL ENCOUNTER (EMERGENCY)
Facility: HOSPITAL | Age: 34
Discharge: HOME | End: 2024-11-25
Attending: EMERGENCY MEDICINE
Payer: MEDICAID

## 2024-11-24 VITALS
SYSTOLIC BLOOD PRESSURE: 120 MMHG | DIASTOLIC BLOOD PRESSURE: 69 MMHG | TEMPERATURE: 98 F | RESPIRATION RATE: 18 BRPM | OXYGEN SATURATION: 100 % | HEART RATE: 66 BPM

## 2024-11-24 DIAGNOSIS — O20.9 VAGINAL BLEEDING AFFECTING EARLY PREGNANCY: Primary | ICD-10-CM

## 2024-11-24 DIAGNOSIS — O03.4 INCOMPLETE MISCARRIAGE: ICD-10-CM

## 2024-11-24 LAB
BASOPHILS # BLD: 0 K/UL (ref 0–0.1)
BASOPHILS NFR BLD: 1 % (ref 0–1)
COMMENT:: NORMAL
DIFFERENTIAL METHOD BLD: ABNORMAL
EOSINOPHIL # BLD: 0.3 K/UL (ref 0–0.4)
EOSINOPHIL NFR BLD: 3 % (ref 0–7)
ERYTHROCYTE [DISTWIDTH] IN BLOOD BY AUTOMATED COUNT: 15 % (ref 11.5–14.5)
HCT VFR BLD AUTO: 35.9 % (ref 35–47)
HGB BLD-MCNC: 11.4 G/DL (ref 11.5–16)
IMM GRANULOCYTES # BLD AUTO: 0 K/UL (ref 0–0.04)
IMM GRANULOCYTES NFR BLD AUTO: 0 % (ref 0–0.5)
LYMPHOCYTES # BLD: 2.8 K/UL (ref 0.8–3.5)
LYMPHOCYTES NFR BLD: 32 % (ref 12–49)
MCH RBC QN AUTO: 26.1 PG (ref 26–34)
MCHC RBC AUTO-ENTMCNC: 31.8 G/DL (ref 30–36.5)
MCV RBC AUTO: 82.3 FL (ref 80–99)
MONOCYTES # BLD: 0.9 K/UL (ref 0–1)
MONOCYTES NFR BLD: 10 % (ref 5–13)
NEUTS SEG # BLD: 4.7 K/UL (ref 1.8–8)
NEUTS SEG NFR BLD: 54 % (ref 32–75)
NRBC # BLD: 0 K/UL (ref 0–0.01)
NRBC BLD-RTO: 0 PER 100 WBC
PLATELET # BLD AUTO: 279 K/UL (ref 150–400)
PMV BLD AUTO: 11.1 FL (ref 8.9–12.9)
RBC # BLD AUTO: 4.36 M/UL (ref 3.8–5.2)
SPECIMEN HOLD: NORMAL
WBC # BLD AUTO: 8.7 K/UL (ref 3.6–11)

## 2024-11-24 PROCEDURE — 36415 COLL VENOUS BLD VENIPUNCTURE: CPT

## 2024-11-24 PROCEDURE — 76801 OB US < 14 WKS SINGLE FETUS: CPT

## 2024-11-24 PROCEDURE — 84702 CHORIONIC GONADOTROPIN TEST: CPT

## 2024-11-24 PROCEDURE — 80053 COMPREHEN METABOLIC PANEL: CPT

## 2024-11-24 PROCEDURE — 85025 COMPLETE CBC W/AUTO DIFF WBC: CPT

## 2024-11-24 PROCEDURE — 76817 TRANSVAGINAL US OBSTETRIC: CPT

## 2024-11-24 PROCEDURE — 99284 EMERGENCY DEPT VISIT MOD MDM: CPT

## 2024-11-24 ASSESSMENT — PAIN - FUNCTIONAL ASSESSMENT
PAIN_FUNCTIONAL_ASSESSMENT: 0-10
PAIN_FUNCTIONAL_ASSESSMENT: ACTIVITIES ARE NOT PREVENTED

## 2024-11-24 ASSESSMENT — PAIN DESCRIPTION - ORIENTATION: ORIENTATION: LOWER

## 2024-11-24 ASSESSMENT — PAIN DESCRIPTION - ONSET: ONSET: ON-GOING

## 2024-11-24 ASSESSMENT — PAIN SCALES - GENERAL: PAINLEVEL_OUTOF10: 8

## 2024-11-24 ASSESSMENT — PAIN DESCRIPTION - FREQUENCY: FREQUENCY: CONTINUOUS

## 2024-11-24 ASSESSMENT — PAIN DESCRIPTION - LOCATION: LOCATION: ABDOMEN;PELVIS

## 2024-11-24 ASSESSMENT — PAIN DESCRIPTION - PAIN TYPE: TYPE: ACUTE PAIN

## 2024-11-24 ASSESSMENT — PAIN DESCRIPTION - DESCRIPTORS: DESCRIPTORS: CRAMPING

## 2024-11-25 LAB
ALBUMIN SERPL-MCNC: 4 G/DL (ref 3.5–5)
ALBUMIN/GLOB SERPL: 1 (ref 1.1–2.2)
ALP SERPL-CCNC: 101 U/L (ref 45–117)
ALT SERPL-CCNC: 21 U/L (ref 12–78)
ANION GAP SERPL CALC-SCNC: 6 MMOL/L (ref 2–12)
APPEARANCE UR: ABNORMAL
AST SERPL-CCNC: 9 U/L (ref 15–37)
BACTERIA URNS QL MICRO: NEGATIVE /HPF
BILIRUB SERPL-MCNC: 0.6 MG/DL (ref 0.2–1)
BILIRUB UR QL CFM: NEGATIVE
BUN SERPL-MCNC: 10 MG/DL (ref 6–20)
BUN/CREAT SERPL: 15 (ref 12–20)
CALCIUM SERPL-MCNC: 9.3 MG/DL (ref 8.5–10.1)
CHLORIDE SERPL-SCNC: 107 MMOL/L (ref 97–108)
CO2 SERPL-SCNC: 25 MMOL/L (ref 21–32)
COLOR UR: ABNORMAL
CREAT SERPL-MCNC: 0.65 MG/DL (ref 0.55–1.02)
CRL: 0.54 CM
CRL: 0.55 CM
CRL: 0.55 CM
EPITH CASTS URNS QL MICRO: ABNORMAL /LPF
GLOBULIN SER CALC-MCNC: 4.2 G/DL (ref 2–4)
GLUCOSE SERPL-MCNC: 111 MG/DL (ref 65–100)
GLUCOSE UR STRIP.AUTO-MCNC: NEGATIVE MG/DL
HCG SERPL-ACNC: ABNORMAL MIU/ML (ref 0–6)
HGB UR QL STRIP: ABNORMAL
HYALINE CASTS URNS QL MICRO: ABNORMAL /LPF (ref 0–5)
KETONES UR QL STRIP.AUTO: NEGATIVE MG/DL
LEUKOCYTE ESTERASE UR QL STRIP.AUTO: ABNORMAL
NITRITE UR QL STRIP.AUTO: NEGATIVE
PH UR STRIP: 8 (ref 5–8)
POTASSIUM SERPL-SCNC: 4.3 MMOL/L (ref 3.5–5.1)
PROT SERPL-MCNC: 8.2 G/DL (ref 6.4–8.2)
PROT UR STRIP-MCNC: 100 MG/DL
RBC #/AREA URNS HPF: >100 /HPF (ref 0–5)
SAC DIAMETER: 1.89 CM
SAC DIAMETER: 1.93 CM
SAC DIAMETER: 1.98 CM
SAC DIAMETER: 2.14 CM
SODIUM SERPL-SCNC: 138 MMOL/L (ref 136–145)
SP GR UR REFRACTOMETRY: 1.01 (ref 1–1.03)
SPECIMEN HOLD: NORMAL
UROBILINOGEN UR QL STRIP.AUTO: 0.2 EU/DL (ref 0.2–1)
WBC URNS QL MICRO: ABNORMAL /HPF (ref 0–4)

## 2024-11-25 PROCEDURE — 6370000000 HC RX 637 (ALT 250 FOR IP)

## 2024-11-25 PROCEDURE — 81001 URINALYSIS AUTO W/SCOPE: CPT

## 2024-11-25 RX ORDER — ACETAMINOPHEN 500 MG
1000 TABLET ORAL
Status: DISCONTINUED | OUTPATIENT
Start: 2024-11-25 | End: 2024-11-25

## 2024-11-25 NOTE — ED TRIAGE NOTES
Pt ambulatory to triage co pain and vaginal bleeding since yesterday. Pt is currently 8 weeks pregnant. . LMP     Pt states she is experiencing light/medium bleeding. Denies any fevers, urinary complains. Pt has an OB

## 2024-11-25 NOTE — DISCHARGE INSTRUCTIONS
You were seen today in the emergency department for vaginal bleeding in early pregnancy.  Ultrasound imaging today shows an intrauterine pregnancy however no cardiac activity is demonstrated at this time.  It appears that there may be a miscarriage in process.  Follow-up closely with your OB/GYN.  Bleeding may increase over the next few days.  Return to the emergency department for any lightheadedness, shortness of breath, fever, severe pain.

## 2024-11-25 NOTE — ED PROVIDER NOTES
other components within normal limits   URINE CULTURE HOLD SAMPLE   EXTRA TUBES HOLD   BILIRUBIN, CONFIRMATORY   EXTRA TUBE, BLOOD BANK       All other labs were within normal range or not returned as of this dictation.    EMERGENCY DEPARTMENT COURSE and DIFFERENTIAL DIAGNOSIS/MDM:   Vitals:    Vitals:    24 2243   BP: 120/69   Pulse: 66   Resp: 18   Temp: 98 °F (36.7 °C)   TempSrc: Oral   SpO2: 100%           Medical Decision Making  This pregnant patient presents with vaginal bleeding in the first trimester. Differential includes ectopic, IUP, threatened/inevitable , along with completed . Patient without a history of coagulopathy or infectious symptoms. Doubt alternate acute emergent pathology. Patient is Rho + so Rho chica is not indicated. Patient with TVUS that showed single intrauterine pregnancy with gestational sac containing a fetal pole with estimated age of 6 weeks 2 days.  There is no cardiac activity, gestation is visualized in the lower uterine segment and is concerning for miscarriage in progress.  CBC with mild anemia.  hCG measures at 18,300.  Urine without evidence of urinary tract infection.  Discussed at length with patient using the  that it does appear that she is in miscarriage in process.  Patient reported increasing bleeding and cramping during her time in the department.  Reporting passing blood clots.  Discussed with patient that I do believe she is currently having a miscarriage and the bleeding will likely continue for the next few days as well as the cramping.  Instructed her to call and schedule follow-up with her OB/GYN as soon as possible.  Strict return precautions given for new or worsening symptoms such as severe pain, lightheadedness, fever, shortness of breath.  Patient verbalized understanding of results and all questions were answered.  She is stable and ambulatory at time of discharge home.    Discussed my clinical impression(s), any labs

## 2024-12-23 ENCOUNTER — OFFICE VISIT (OUTPATIENT)
Age: 34
End: 2024-12-23
Payer: MEDICAID

## 2024-12-23 VITALS
BODY MASS INDEX: 29.04 KG/M2 | HEIGHT: 61 IN | HEART RATE: 60 BPM | WEIGHT: 153.8 LBS | SYSTOLIC BLOOD PRESSURE: 116 MMHG | TEMPERATURE: 98.7 F | OXYGEN SATURATION: 99 % | RESPIRATION RATE: 20 BRPM | DIASTOLIC BLOOD PRESSURE: 62 MMHG

## 2024-12-23 DIAGNOSIS — D64.9 ANEMIA, UNSPECIFIED TYPE: ICD-10-CM

## 2024-12-23 DIAGNOSIS — M79.673 HAND AND FOOT PAIN, UNSPECIFIED LATERALITY: ICD-10-CM

## 2024-12-23 DIAGNOSIS — E11.42 TYPE 2 DIABETES MELLITUS WITH DIABETIC POLYNEUROPATHY, WITHOUT LONG-TERM CURRENT USE OF INSULIN (HCC): ICD-10-CM

## 2024-12-23 DIAGNOSIS — M79.643 HAND AND FOOT PAIN, UNSPECIFIED LATERALITY: ICD-10-CM

## 2024-12-23 DIAGNOSIS — E55.9 VITAMIN D DEFICIENCY: ICD-10-CM

## 2024-12-23 DIAGNOSIS — F43.21 ADJUSTMENT DISORDER WITH DEPRESSED MOOD: Primary | ICD-10-CM

## 2024-12-23 DIAGNOSIS — K21.9 GASTROESOPHAGEAL REFLUX DISEASE WITHOUT ESOPHAGITIS: ICD-10-CM

## 2024-12-23 PROCEDURE — 99214 OFFICE O/P EST MOD 30 MIN: CPT | Performed by: STUDENT IN AN ORGANIZED HEALTH CARE EDUCATION/TRAINING PROGRAM

## 2024-12-23 RX ORDER — FAMOTIDINE 40 MG/1
40 TABLET, FILM COATED ORAL DAILY
Qty: 90 TABLET | Refills: 1 | Status: SHIPPED | OUTPATIENT
Start: 2024-12-23

## 2024-12-23 RX ORDER — MULTIVIT-MIN/IRON FUM/FOLIC AC 7.5 MG-4
1 TABLET ORAL DAILY
Qty: 90 TABLET | Refills: 1 | Status: SHIPPED | OUTPATIENT
Start: 2024-12-23

## 2024-12-23 RX ORDER — ERGOCALCIFEROL 1.25 MG/1
50000 CAPSULE, LIQUID FILLED ORAL WEEKLY
Qty: 12 CAPSULE | Refills: 1 | Status: SHIPPED | OUTPATIENT
Start: 2024-12-23

## 2024-12-23 ASSESSMENT — PATIENT HEALTH QUESTIONNAIRE - PHQ9
1. LITTLE INTEREST OR PLEASURE IN DOING THINGS: NOT AT ALL
SUM OF ALL RESPONSES TO PHQ QUESTIONS 1-9: 0
SUM OF ALL RESPONSES TO PHQ QUESTIONS 1-9: 0
SUM OF ALL RESPONSES TO PHQ9 QUESTIONS 1 & 2: 0
SUM OF ALL RESPONSES TO PHQ QUESTIONS 1-9: 0
2. FEELING DOWN, DEPRESSED OR HOPELESS: NOT AT ALL
SUM OF ALL RESPONSES TO PHQ QUESTIONS 1-9: 0

## 2024-12-23 NOTE — PROGRESS NOTES
Chief Complaint   Patient presents with    Follow-up       \"Have you been to the ER, urgent care clinic since your last visit?  Hospitalized since your last visit?\"    NO    “Have you seen or consulted any other health care providers [facility] of [facility] since [facility] last visit?”    NO            Click Here for Release of Records Request     No results found for this visit on 24.   Vitals:    24 1602   BP: 116/62   Site: Right Upper Arm   Position: Sitting   Cuff Size: Large Adult   Pulse: 60   Resp: 20   Temp: 98.7 °F (37.1 °C)   TempSrc: Temporal   SpO2: 99%   Weight: 69.8 kg (153 lb 12.8 oz)   Height: 1.544 m (5' 0.8\")      Health Maintenance Due   Topic Date Due    Varicella vaccine (1 of 2 - 13+ 2-dose series) Never done    HIV screen  Never done    Diabetic retinal exam  Never done    Hepatitis B vaccine (1 of 3 - 19+ 3-dose series) Never done    DTaP/Tdap/Td vaccine (1 - Tdap) Never done    Pneumococcal 0-64 years Vaccine (2 of 2 - PCV) 2022    Diabetic foot exam  2022    Flu vaccine (1) 2024    COVID-19 Vaccine (3 - - season) 2024        The patient, Sandy Driscoll, identity was verified by name and .   
Adult)   Pulse 60   Temp 98.7 °F (37.1 °C) (Temporal)   Resp 20   Ht 1.544 m (5' 0.8\")   Wt 69.8 kg (153 lb 12.8 oz)   SpO2 99%   BMI 29.25 kg/m²   Physical Exam  Vitals reviewed.   Constitutional:       General: She is not in acute distress.  HENT:      Head: Normocephalic and atraumatic.   Pulmonary:      Effort: Pulmonary effort is normal. No respiratory distress.   Skin:     General: Skin is warm and dry.   Neurological:      Mental Status: She is oriented to person, place, and time.   Psychiatric:         Mood and Affect: Mood normal.         Behavior: Behavior normal.          No results found for this or any previous visit (from the past 24 hour(s)).

## 2025-02-06 ENCOUNTER — HOSPITAL ENCOUNTER (EMERGENCY)
Facility: HOSPITAL | Age: 35
Discharge: HOME | End: 2025-02-06
Attending: EMERGENCY MEDICINE
Payer: MEDICAID

## 2025-02-06 ENCOUNTER — APPOINTMENT (OUTPATIENT)
Facility: HOSPITAL | Age: 35
End: 2025-02-06
Payer: MEDICAID

## 2025-02-06 VITALS
RESPIRATION RATE: 18 BRPM | SYSTOLIC BLOOD PRESSURE: 120 MMHG | HEART RATE: 76 BPM | DIASTOLIC BLOOD PRESSURE: 66 MMHG | BODY MASS INDEX: 29.06 KG/M2 | OXYGEN SATURATION: 98 % | TEMPERATURE: 97.5 F | HEIGHT: 61 IN

## 2025-02-06 DIAGNOSIS — D64.9 ANEMIA, UNSPECIFIED TYPE: Primary | ICD-10-CM

## 2025-02-06 DIAGNOSIS — M67.912 TENDINOPATHY OF LEFT ROTATOR CUFF: ICD-10-CM

## 2025-02-06 DIAGNOSIS — G44.201 ACUTE INTRACTABLE TENSION-TYPE HEADACHE: ICD-10-CM

## 2025-02-06 LAB
ALBUMIN SERPL-MCNC: 3.8 G/DL (ref 3.5–5)
ALBUMIN/GLOB SERPL: 0.9 (ref 1.1–2.2)
ALP SERPL-CCNC: 129 U/L (ref 45–117)
ALT SERPL-CCNC: 25 U/L (ref 12–78)
ANION GAP SERPL CALC-SCNC: 10 MMOL/L (ref 2–12)
AST SERPL-CCNC: 12 U/L (ref 15–37)
BASOPHILS # BLD: 0.03 K/UL (ref 0–0.1)
BASOPHILS NFR BLD: 0.4 % (ref 0–1)
BILIRUB SERPL-MCNC: 0.5 MG/DL (ref 0.2–1)
BUN SERPL-MCNC: 12 MG/DL (ref 6–20)
BUN/CREAT SERPL: 17 (ref 12–20)
CALCIUM SERPL-MCNC: 8.7 MG/DL (ref 8.5–10.1)
CHLORIDE SERPL-SCNC: 105 MMOL/L (ref 97–108)
CO2 SERPL-SCNC: 25 MMOL/L (ref 21–32)
CREAT SERPL-MCNC: 0.71 MG/DL (ref 0.55–1.02)
DIFFERENTIAL METHOD BLD: ABNORMAL
EOSINOPHIL # BLD: 0.12 K/UL (ref 0–0.4)
EOSINOPHIL NFR BLD: 1.5 % (ref 0–7)
ERYTHROCYTE [DISTWIDTH] IN BLOOD BY AUTOMATED COUNT: 14.8 % (ref 11.5–14.5)
GLOBULIN SER CALC-MCNC: 4.1 G/DL (ref 2–4)
GLUCOSE SERPL-MCNC: 118 MG/DL (ref 65–100)
HCG UR QL: NEGATIVE
HCT VFR BLD AUTO: 32 % (ref 35–47)
HGB BLD-MCNC: 9.5 G/DL (ref 11.5–16)
IMM GRANULOCYTES # BLD AUTO: 0.02 K/UL (ref 0–0.04)
IMM GRANULOCYTES NFR BLD AUTO: 0.3 % (ref 0–0.5)
IRON SATN MFR SERPL: 4 % (ref 20–50)
IRON SERPL-MCNC: 16 UG/DL (ref 35–150)
LYMPHOCYTES # BLD: 1.83 K/UL (ref 0.8–3.5)
LYMPHOCYTES NFR BLD: 23.1 % (ref 12–49)
MCH RBC QN AUTO: 23.3 PG (ref 26–34)
MCHC RBC AUTO-ENTMCNC: 29.7 G/DL (ref 30–36.5)
MCV RBC AUTO: 78.6 FL (ref 80–99)
MONOCYTES # BLD: 0.57 K/UL (ref 0–1)
MONOCYTES NFR BLD: 7.2 % (ref 5–13)
NEUTS SEG # BLD: 5.34 K/UL (ref 1.8–8)
NEUTS SEG NFR BLD: 67.5 % (ref 32–75)
NRBC # BLD: 0 K/UL (ref 0–0.01)
NRBC BLD-RTO: 0 PER 100 WBC
PLATELET # BLD AUTO: 301 K/UL (ref 150–400)
PMV BLD AUTO: 10.5 FL (ref 8.9–12.9)
POTASSIUM SERPL-SCNC: 4 MMOL/L (ref 3.5–5.1)
PROT SERPL-MCNC: 7.9 G/DL (ref 6.4–8.2)
RBC # BLD AUTO: 4.07 M/UL (ref 3.8–5.2)
SODIUM SERPL-SCNC: 140 MMOL/L (ref 136–145)
TIBC SERPL-MCNC: 432 UG/DL (ref 250–450)
WBC # BLD AUTO: 7.9 K/UL (ref 3.6–11)

## 2025-02-06 PROCEDURE — 83540 ASSAY OF IRON: CPT

## 2025-02-06 PROCEDURE — 70498 CT ANGIOGRAPHY NECK: CPT

## 2025-02-06 PROCEDURE — 93005 ELECTROCARDIOGRAM TRACING: CPT | Performed by: EMERGENCY MEDICINE

## 2025-02-06 PROCEDURE — 6360000004 HC RX CONTRAST MEDICATION: Performed by: EMERGENCY MEDICINE

## 2025-02-06 PROCEDURE — 80053 COMPREHEN METABOLIC PANEL: CPT

## 2025-02-06 PROCEDURE — 81025 URINE PREGNANCY TEST: CPT

## 2025-02-06 PROCEDURE — 83550 IRON BINDING TEST: CPT

## 2025-02-06 PROCEDURE — 70450 CT HEAD/BRAIN W/O DYE: CPT

## 2025-02-06 PROCEDURE — 6360000002 HC RX W HCPCS: Performed by: EMERGENCY MEDICINE

## 2025-02-06 PROCEDURE — 99285 EMERGENCY DEPT VISIT HI MDM: CPT

## 2025-02-06 PROCEDURE — 96374 THER/PROPH/DIAG INJ IV PUSH: CPT

## 2025-02-06 PROCEDURE — 96375 TX/PRO/DX INJ NEW DRUG ADDON: CPT

## 2025-02-06 PROCEDURE — 85025 COMPLETE CBC W/AUTO DIFF WBC: CPT

## 2025-02-06 RX ORDER — BUTALBITAL, ACETAMINOPHEN AND CAFFEINE 300; 40; 50 MG/1; MG/1; MG/1
1 CAPSULE ORAL EVERY 4 HOURS PRN
Qty: 30 CAPSULE | Refills: 0 | Status: SHIPPED | OUTPATIENT
Start: 2025-02-06

## 2025-02-06 RX ORDER — DEXAMETHASONE SODIUM PHOSPHATE 10 MG/ML
10 INJECTION, SOLUTION INTRAMUSCULAR; INTRAVENOUS ONCE
Status: COMPLETED | OUTPATIENT
Start: 2025-02-06 | End: 2025-02-06

## 2025-02-06 RX ORDER — KETOROLAC TROMETHAMINE 30 MG/ML
15 INJECTION, SOLUTION INTRAMUSCULAR; INTRAVENOUS ONCE
Status: COMPLETED | OUTPATIENT
Start: 2025-02-06 | End: 2025-02-06

## 2025-02-06 RX ORDER — IOPAMIDOL 755 MG/ML
100 INJECTION, SOLUTION INTRAVASCULAR
Status: COMPLETED | OUTPATIENT
Start: 2025-02-06 | End: 2025-02-06

## 2025-02-06 RX ORDER — PROCHLORPERAZINE EDISYLATE 5 MG/ML
10 INJECTION INTRAMUSCULAR; INTRAVENOUS ONCE
Status: COMPLETED | OUTPATIENT
Start: 2025-02-06 | End: 2025-02-06

## 2025-02-06 RX ORDER — DIPHENHYDRAMINE HYDROCHLORIDE 50 MG/ML
25 INJECTION INTRAMUSCULAR; INTRAVENOUS
Status: COMPLETED | OUTPATIENT
Start: 2025-02-06 | End: 2025-02-06

## 2025-02-06 RX ORDER — FERROUS SULFATE 325(65) MG
325 TABLET ORAL 2 TIMES DAILY
Qty: 60 TABLET | Refills: 0 | Status: SHIPPED | OUTPATIENT
Start: 2025-02-06

## 2025-02-06 RX ADMIN — KETOROLAC TROMETHAMINE 15 MG: 30 INJECTION, SOLUTION INTRAMUSCULAR at 16:27

## 2025-02-06 RX ADMIN — DIPHENHYDRAMINE HYDROCHLORIDE 25 MG: 50 INJECTION INTRAMUSCULAR; INTRAVENOUS at 16:26

## 2025-02-06 RX ADMIN — PROCHLORPERAZINE EDISYLATE 10 MG: 5 INJECTION INTRAMUSCULAR; INTRAVENOUS at 16:26

## 2025-02-06 RX ADMIN — DEXAMETHASONE SODIUM PHOSPHATE 10 MG: 10 INJECTION, SOLUTION INTRAMUSCULAR; INTRAVENOUS at 16:26

## 2025-02-06 RX ADMIN — IOPAMIDOL 100 ML: 755 INJECTION, SOLUTION INTRAVENOUS at 17:25

## 2025-02-06 ASSESSMENT — PAIN SCALES - GENERAL: PAINLEVEL_OUTOF10: 7

## 2025-02-06 ASSESSMENT — PAIN - FUNCTIONAL ASSESSMENT: PAIN_FUNCTIONAL_ASSESSMENT: 0-10

## 2025-02-06 NOTE — ED PROVIDER NOTES
[facility] EMERGENCY DEPARTMENT  EMERGENCY DEPARTMENT ENCOUNTER       Pt Name: Sandy Driscoll  MRN: [id number]  Birthdate 1990  Date of evaluation: 2/6/2025  Provider: Eduardo Morales MD   PCP: Yanni Hoskins DO  Note Started: 6:12 PM 2/6/25     CHIEF COMPLAINT       Chief Complaint   Patient presents with    Headache    Shoulder Pain        HISTORY OF PRESENT ILLNESS: 1 or more elements      History From: Patient, History limited by: Johana speaking only,  used     Sandy Driscoll is a 35 y.o. female with a history of prior tension headache, dysfunctional uterine bleeding, recent spontaneous miscarriage November of last year who presents with headache.  She reports approximately 1 week of generalized throbbing headache.  Headache is severe, she reports sudden in onset.  She has had associated fatigue.  Endorses about 1 month of left shoulder pain and numbness to her left arm, otherwise denies numbness or weakness.  History of similar headache in the past associated with anemia.  Endorses vaginal bleeding associated spontaneous miscarriage November of last year, no recent vaginal bleeding, no melena or blood in stool.     Nursing Notes were all reviewed and agreed with or any disagreements were addressed in the HPI.     REVIEW OF SYSTEMS        Positives and Pertinent negatives as per HPI.    PAST HISTORY     Past Medical History:  Past Medical History:   Diagnosis Date    Anemia     Diabetes mellitus (HCC)        Past Surgical History:  Past Surgical History:   Procedure Laterality Date    APPENDECTOMY      UPPER GASTROINTESTINAL ENDOSCOPY N/A 7/10/2023    EGD ESOPHAGOGASTRODUODENOSCOPY performed by Jacobo Brooke MD at [facility] ENDOSCOPY    UPPER GASTROINTESTINAL ENDOSCOPY N/A 7/10/2023    EGD BIOPSY performed by Jacobo Brooke MD at [facility] ENDOSCOPY       Family History:  Family History   Problem Relation Age of Onset    No Known Problems Mother     No Known Problems Father        Social  dissection or LVO [WB]      ED Course User Index  [WB] Eduardo Morales MD     NIHSS: 0        FINAL IMPRESSION     1. Anemia, unspecified type    2. Tendinopathy of left rotator cuff    3. Acute intractable tension-type headache          DISPOSITION/PLAN   Sandy Driscoll's  results have been reviewed with her.  She has been counseled regarding her diagnosis, treatment, and plan.  She verbally conveys understanding and agreement of the signs, symptoms, diagnosis, treatment and prognosis and additionally agrees to follow up as discussed.  She also agrees with the care-plan and conveys that all of her questions have been answered.  I have also provided discharge instructions for her that include: educational information regarding their diagnosis and treatment, and list of reasons why they would want to return to the ED prior to their follow-up appointment, should her condition change.     CLINICAL IMPRESSION    Discharge Note: The patient is stable for discharge home. The signs, symptoms, diagnosis, and discharge instructions have been discussed, understanding conveyed, and agreed upon. The patient is to follow up as recommended or return to ER should their symptoms worsen.      PATIENT REFERRED TO:  Yanni Hoskins,   [address] [facility] [address] [phone]             DISCHARGE MEDICATIONS:     Medication List        START taking these medications      butalbital-APAP-caffeine -40 MG Caps per capsule  Commonly known as: Fioricet  Take 1 capsule by mouth every 4 hours as needed for Headaches     ferrous sulfate 325 (65 Fe) MG tablet  Commonly known as: IRON 325  Take 1 tablet by mouth 2 times daily            CONTINUE taking these medications      Lancets Misc            ASK your doctor about these medications      diclofenac sodium 1 % Gel  Commonly known as: VOLTAREN  Apply 2 g topically 4 times daily     famotidine 40 MG tablet  Commonly known as: PEPCID  Take 1 tablet by mouth daily    Eucrisa Counseling: Patient may experience a mild burning sensation during topical application. Eucrisa is not approved in children less than 2 years of age.

## 2025-02-06 NOTE — ED TRIAGE NOTES
Pt arrives ambulatory with cc of headache and left shoulder pain, pt speaks amaya.  Pt has hx of needing blood transfusions, usually she starts having headaches and she states after the transfusions the headaches improve

## 2025-02-06 NOTE — DISCHARGE INSTRUCTIONS
Thank You!    It was a pleasure taking care of you in our Emergency Department today. We know that when you come to our Emergency Department, you are entrusting us with your health, comfort, and safety. Our physicians and nurses honor that trust, and truly appreciate the opportunity to care for you and your loved ones.      We also value your feedback. If you receive a survey about your Emergency Department experience today, please fill it out.  We care about our patients' feedback, and we listen to what you have to say.  Thank you.    Eduardo Morales MD  ________________________________________________________________________  I have included a copy of your lab results and/or radiologic studies from today's visit so you can have them easily available at your follow-up visit. We hope you feel better and please do not hesitate to contact the ED if you have any questions at all!    Recent Results (from the past 12 hour(s))   EKG 12 Lead    Collection Time: 02/06/25  4:03 PM   Result Value Ref Range    Ventricular Rate 72 BPM    Atrial Rate 72 BPM    P-R Interval 120 ms    QRS Duration 80 ms    Q-T Interval 386 ms    QTc Calculation (Bazett) 422 ms    P Axis 42 degrees    R Axis 41 degrees    T Axis 28 degrees    Diagnosis       Normal sinus rhythm  Cannot rule out Anterior infarct , age undetermined  Abnormal ECG  When compared with ECG of 26-FEB-2020 20:27,  No significant change was found     CBC with Auto Differential    Collection Time: 02/06/25  4:10 PM   Result Value Ref Range    WBC 7.9 3.6 - 11.0 K/uL    RBC 4.07 3.80 - 5.20 M/uL    Hemoglobin 9.5 (L) 11.5 - 16.0 g/dL    Hematocrit 32.0 (L) 35.0 - 47.0 %    MCV 78.6 (L) 80.0 - 99.0 FL    MCH 23.3 (L) 26.0 - 34.0 PG    MCHC 29.7 (L) 30.0 - 36.5 g/dL    RDW 14.8 (H) 11.5 - 14.5 %    Platelets 301 150 - 400 K/uL    MPV 10.5 8.9 - 12.9 FL    Nucleated RBCs 0.0 0  WBC    nRBC 0.00 0.00 - 0.01 K/uL    Neutrophils % 67.5 32.0 - 75.0 %    Lymphocytes % 23.1 12.0

## 2025-02-06 NOTE — ED NOTES
Patient (s)  given copy of dc instructions and 2 script(s). Patient (s)  verbalized understanding of instructions and script (s). Patient given a current medication reconciliation form and verbalized understanding of their medications. Patient (s) verbalized understanding of the importance of discussing medications with his or her physician or clinic they will be following up with. Patient alert and oriented and in no acute distress. Patient discharged home ambulatory with family.       used for discharge instructions.

## 2025-02-06 NOTE — ED NOTES
Pt presents ambulatory to ED complaining of a headache, light sensitivity x1 week, and L shoulder pain and occ numbness/ tingling x1 month. Pt is alert and oriented x 4, RR even and unlabored, skin is warm and dry. Assesment completed and pt updated on plan of care.       Emergency Department Nursing Plan of Care       The Nursing Plan of Care is developed from the Nursing assessment and Emergency Department Attending provider initial evaluation.  The plan of care may be reviewed in the “ED Provider note”.    The Plan of Care was developed with the following considerations:   Patient / Family readiness to learn indicated by:verbalized understanding  Persons(s) to be included in education: patient  Barriers to Learning/Limitations:None    Signed     Halina Munoz RN    2/6/2025   6:14 PM

## 2025-02-07 LAB
EKG ATRIAL RATE: 72 BPM
EKG DIAGNOSIS: NORMAL
EKG P AXIS: 42 DEGREES
EKG P-R INTERVAL: 120 MS
EKG Q-T INTERVAL: 386 MS
EKG QRS DURATION: 80 MS
EKG QTC CALCULATION (BAZETT): 422 MS
EKG R AXIS: 41 DEGREES
EKG T AXIS: 28 DEGREES
EKG VENTRICULAR RATE: 72 BPM

## 2025-02-07 PROCEDURE — 93010 ELECTROCARDIOGRAM REPORT: CPT | Performed by: SPECIALIST

## (undated) DEVICE — DISPOSABLE EMR KIT: Brand: DISPOSABLE EMR KIT

## (undated) DEVICE — ELECTRODE PT RET AD L9FT HI MOIST COND ADH HYDRGEL CORDED

## (undated) DEVICE — ENDOSCOPIC MUCOSAL RESECTION DEVICE: Brand: CAPTIVATOR EMR

## (undated) DEVICE — SET GRAV CK VLV NEEDLESS ST 3 GANGED 4WAY STPCOCK HI FLO 10

## (undated) DEVICE — TUBING IRRIG COMPATIBLE W ERBE MEDIVATOR PMP HYDR

## (undated) DEVICE — FORCEPS BX L240CM JAW DIA2.4MM ORNG L CAP W/ NDL DISP RAD